# Patient Record
Sex: FEMALE | Race: WHITE | NOT HISPANIC OR LATINO | Employment: OTHER | URBAN - METROPOLITAN AREA
[De-identification: names, ages, dates, MRNs, and addresses within clinical notes are randomized per-mention and may not be internally consistent; named-entity substitution may affect disease eponyms.]

---

## 2017-12-14 ENCOUNTER — OFFICE VISIT (OUTPATIENT)
Dept: SURGERY | Facility: CLINIC | Age: 57
End: 2017-12-14

## 2017-12-14 DIAGNOSIS — K62.5 RECTAL BLEEDING: ICD-10-CM

## 2017-12-14 DIAGNOSIS — R10.30 LOWER ABDOMINAL PAIN: Primary | ICD-10-CM

## 2017-12-14 PROCEDURE — 99244 OFF/OP CNSLTJ NEW/EST MOD 40: CPT | Performed by: SURGERY

## 2017-12-14 RX ORDER — CIPROFLOXACIN 500 MG/1
TABLET, FILM COATED ORAL
Refills: 0 | COMMUNITY
Start: 2017-12-06 | End: 2018-01-16

## 2017-12-14 RX ORDER — ALPRAZOLAM 0.5 MG/1
TABLET ORAL
Refills: 0 | COMMUNITY
Start: 2017-11-16 | End: 2018-01-16

## 2017-12-14 RX ORDER — CEPHALEXIN 500 MG/1
500 CAPSULE ORAL 3 TIMES DAILY
Qty: 21 CAPSULE | Refills: 0 | Status: SHIPPED | OUTPATIENT
Start: 2017-12-14 | End: 2017-12-21

## 2017-12-14 RX ORDER — METRONIDAZOLE 500 MG/1
500 TABLET ORAL 3 TIMES DAILY
Qty: 21 TABLET | Refills: 0 | Status: SHIPPED | OUTPATIENT
Start: 2017-12-14 | End: 2017-12-21

## 2017-12-14 RX ORDER — FLUTICASONE PROPIONATE 50 MCG
SPRAY, SUSPENSION (ML) NASAL
Refills: 12 | COMMUNITY
Start: 2017-10-16 | End: 2018-01-16

## 2017-12-16 NOTE — PROGRESS NOTES
SUMMARY (A/P):    57-year-old lady with possible diverticulitis and rectal bleeding.  Her symptoms improved with ciprofloxacin.  I feel that due to ongoing but milder symptoms she should complete another course of antibiotics and have prescribed Keflex and Flagyl together for 1 week.  I've also recommended proceeding with colonoscopy and this is scheduled for January.  She understands the rationale for the procedure and the nature of the procedure and the risks including but not limited to bleeding and perforation.    I did discuss with her the risks of tobacco use and benefits of smoking cessation as well as the increased risk with surgical procedures and anesthesia/sedation.      CC:  Referred for consultation by Dr. Murrieta regarding abdominal pain.    HPI:  57-year-old lady presents with 2 month history of right lower quadrant abdominal pain that was moderately severe and associated with constipation, mucus discharge, and dark blood in her bowel movements.  Ciprofloxacin was prescribed and improved her symptoms significantly.  Residual symptoms are mild at this point.    PHYSICAL EXAM:   Constitutional: Well-developed well-nourished, no acute distress  Eyes: Conjunctiva normal, sclera nonicteric  ENMT: Hearing grossly normal, oral mucosa moist  Neck: Supple, no palpable mass, normal thyroid, trachea midline  Respiratory: Clear to auscultation, normal inspiratory effort  Cardiovascular: Regular rate, no murmur, no carotid bruit, no peripheral edema, no jugular venous distention  Gastrointestinal: Soft, nontender, no palpable mass, no hepatosplenomegaly, negative for hernia, bowel sounds normal  Lymphatics (palpable nodes):  cervical-negative, axillary-negative  Skin:  Warm, dry, no rash on visualized skin surfaces  Musculoskeletal: Symmetric strength, normal gait  Psychiatric: Alert and oriented ×3, normal affect     ALLERGIES: reviewed, in Epic    MEDICATIONS: reviewed, in Epic    PMH:    Endometrial cancer age  26  Melanoma (anterior neck) 2005  Atypical hyperplasia right breast 2011    PSH:    -Right breast lumpectomy 2011  -Hysterectomy with bilateral salpingo-oophorectomy age 26  -Colonoscopy proximally 5 years ago with adenomatous polyps  -Appendectomy    FAMILY HISTORY:    Negative for colorectal cancer    SOCIAL HISTORY:   Pack per day tobacco use  Occasional alcohol use    ROS:  No chest pain or shortness of air.  All other systems reviewed and negative other than presenting complaints.    RADIOLOGY/ENDOSCOPY:    CT abdomen pelvis 12/5/2017 demonstrated sigmoid diverticulosis with wall thickening, cannot exclude diverticulitis.  I reviewed the images and concur that the sigmoid colon appears chronically thickened without significant perisigmoid inflammatory change.    JAG COLE M.D.

## 2018-01-16 RX ORDER — ALPRAZOLAM 0.5 MG/1
0.5 TABLET ORAL DAILY PRN
COMMUNITY
End: 2021-02-01 | Stop reason: SDUPTHER

## 2018-01-17 ENCOUNTER — ANESTHESIA (OUTPATIENT)
Dept: GASTROENTEROLOGY | Facility: HOSPITAL | Age: 58
End: 2018-01-17

## 2018-01-17 ENCOUNTER — ANESTHESIA EVENT (OUTPATIENT)
Dept: GASTROENTEROLOGY | Facility: HOSPITAL | Age: 58
End: 2018-01-17

## 2018-01-17 ENCOUNTER — HOSPITAL ENCOUNTER (OUTPATIENT)
Facility: HOSPITAL | Age: 58
Setting detail: HOSPITAL OUTPATIENT SURGERY
Discharge: HOME | End: 2018-01-17
Attending: SURGERY | Admitting: SURGERY

## 2018-01-17 VITALS
RESPIRATION RATE: 18 BRPM | BODY MASS INDEX: 19.91 KG/M2 | WEIGHT: 131.4 LBS | OXYGEN SATURATION: 100 % | HEART RATE: 88 BPM | HEIGHT: 68 IN | DIASTOLIC BLOOD PRESSURE: 68 MMHG | TEMPERATURE: 98.4 F | SYSTOLIC BLOOD PRESSURE: 116 MMHG

## 2018-01-17 PROCEDURE — 45330 DIAGNOSTIC SIGMOIDOSCOPY: CPT | Performed by: SURGERY

## 2018-01-17 PROCEDURE — 25010000002 PROPOFOL 10 MG/ML EMULSION: Performed by: ANESTHESIOLOGY

## 2018-01-17 RX ORDER — VARENICLINE TARTRATE 1 MG/1
1 TABLET, FILM COATED ORAL 2 TIMES DAILY
COMMUNITY
End: 2018-03-15

## 2018-01-17 RX ORDER — LIDOCAINE HYDROCHLORIDE 20 MG/ML
INJECTION, SOLUTION INFILTRATION; PERINEURAL AS NEEDED
Status: DISCONTINUED | OUTPATIENT
Start: 2018-01-17 | End: 2018-01-17 | Stop reason: SURG

## 2018-01-17 RX ORDER — PROPOFOL 10 MG/ML
VIAL (ML) INTRAVENOUS AS NEEDED
Status: DISCONTINUED | OUTPATIENT
Start: 2018-01-17 | End: 2018-01-17 | Stop reason: SURG

## 2018-01-17 RX ORDER — SODIUM CHLORIDE, SODIUM LACTATE, POTASSIUM CHLORIDE, CALCIUM CHLORIDE 600; 310; 30; 20 MG/100ML; MG/100ML; MG/100ML; MG/100ML
30 INJECTION, SOLUTION INTRAVENOUS CONTINUOUS PRN
Status: DISCONTINUED | OUTPATIENT
Start: 2018-01-17 | End: 2018-01-17 | Stop reason: HOSPADM

## 2018-01-17 RX ORDER — SODIUM CHLORIDE 0.9 % (FLUSH) 0.9 %
1-10 SYRINGE (ML) INJECTION AS NEEDED
Status: DISCONTINUED | OUTPATIENT
Start: 2018-01-17 | End: 2018-01-17 | Stop reason: HOSPADM

## 2018-01-17 RX ORDER — PROPOFOL 10 MG/ML
VIAL (ML) INTRAVENOUS CONTINUOUS PRN
Status: DISCONTINUED | OUTPATIENT
Start: 2018-01-17 | End: 2018-01-17 | Stop reason: SURG

## 2018-01-17 RX ADMIN — PROPOFOL 120 MCG/KG/MIN: 10 INJECTION, EMULSION INTRAVENOUS at 13:47

## 2018-01-17 RX ADMIN — SODIUM CHLORIDE, POTASSIUM CHLORIDE, SODIUM LACTATE AND CALCIUM CHLORIDE 30 ML/HR: 600; 310; 30; 20 INJECTION, SOLUTION INTRAVENOUS at 13:14

## 2018-01-17 RX ADMIN — LIDOCAINE HYDROCHLORIDE 40 MG: 20 INJECTION, SOLUTION INFILTRATION; PERINEURAL at 13:47

## 2018-01-17 RX ADMIN — PROPOFOL 50 MG: 10 INJECTION, EMULSION INTRAVENOUS at 13:47

## 2018-01-17 NOTE — ANESTHESIA POSTPROCEDURE EVALUATION
"Patient: Janis Rosa    Procedure Summary     Date Anesthesia Start Anesthesia Stop Room / Location    01/17/18 1342 1404  [facility]       Procedure Diagnosis Surgeon Provider    FLEXIBLE SIGMOIDOSCPY TO 50 CM (N/A ) Rectal bleeding; Lower abdominal pain  (Rectal bleeding [K62.5]; Lower abdominal pain [R10.30]) MD Melania Rosas MD          Anesthesia Type: MAC  Last vitals  BP   96/71 (01/17/18 1416)   Temp   36.9 °C (98.4 °F) (01/17/18 1416)   Pulse   81 (01/17/18 1416)   Resp   18 (01/17/18 1307)     SpO2   96 % (01/17/18 1416)     Post Anesthesia Care and Evaluation    Patient location during evaluation: PACU  Patient participation: complete - patient participated  Level of consciousness: awake and alert  Pain management: adequate  Airway patency: patent  Anesthetic complications: No anesthetic complications    Cardiovascular status: acceptable  Respiratory status: acceptable  Hydration status: acceptable    Comments: BP 96/71 (BP Location: Left arm, Patient Position: Lying)  Pulse 81  Temp 36.9 °C (98.4 °F) (Oral)   Resp 18  Ht 172.7 cm (68\")  Wt 59.6 kg (131 lb 6.4 oz)  SpO2 96%  BMI 19.98 kg/m2      "

## 2018-01-17 NOTE — PLAN OF CARE
Problem: Patient Care Overview (Adult)  Goal: Plan of Care Review  Outcome: Ongoing (interventions implemented as appropriate)   01/17/18 1252   Coping/Psychosocial Response Interventions   Plan Of Care Reviewed With patient   Patient Care Overview   Progress improving   Outcome Evaluation   Outcome Summary/Follow up Plan vss, ready for or     Goal: Adult Individualization and Mutuality  Outcome: Ongoing (interventions implemented as appropriate)   01/17/18 1252   Individualization   Patient Specific Preferences jody by evelio     Goal: Discharge Needs Assessment  Outcome: Ongoing (interventions implemented as appropriate)   01/17/18 1252   Discharge Needs Assessment   Concerns To Be Addressed denies needs/concerns at this time   Discharge Disposition home or self-care   Living Environment   Transportation Available car;family or friend will provide       Problem: GI Endoscopy (Adult)  Intervention: Monitor/Manage Procedure Recovery   01/17/18 1252   Respiratory Interventions   Airway/Ventilation Management airway patency maintained   Coping/Psychosocial Interventions   Environmental Support calm environment promoted   Activity   Activity Type activity adjusted per tolerance   Cardiac Interventions   Warming Thermoregulation Maintenance warm fluids administered     Intervention: Prevent Dorothea-procedural Injury   01/17/18 1252   Positioning   Positioning side lying, left   Head Of Bed (HOB) Position HOB elevated       Goal: Signs and Symptoms of Listed Potential Problems Will be Absent or Manageable (GI Endoscopy)  Outcome: Ongoing (interventions implemented as appropriate)   01/17/18 1252   GI Endoscopy   Problems Assessed (GI Endoscopy) all   Problems Present (GI Endoscopy) none

## 2018-01-17 NOTE — OP NOTE
PREOPERATIVE DIAGNOSIS:  Abdominal pain  Rectal bleeding    POSTOPERATIVE DIAGNOSIS AND FINDINGS:  Marked sigmoid diverticulosis with angulation precluding passage of colonoscope    PROCEDURE:  Flexible sigmoidoscopy to 50 cm    SURGEON:  Jaycob Anthony MD    ANESTHESIA:  MAC    SPECIMEN(S):  none    DESCRIPTION:  In decubitus position digital rectal exam was normal. Colonoscope inserted under direct visualization of lumen to sigmoid colon, 50 cm from anal verge.  Marked sigmoid diverticulosis was noted.  At 50 cm angulation of colon precluded passage of colonoscope despite various maneuvers.  Visualized portion of colon and rectum were normal with the exception of diverticulosis.  Tolerated well.    Jaycob Anthony M.D.

## 2018-01-17 NOTE — H&P
SUMMARY (A/P):    57-year-old lady with possible diverticulitis and rectal bleeding.  Her symptoms improved with ciprofloxacin.  I feel that due to ongoing but milder symptoms she should complete another course of antibiotics and have prescribed Keflex and Flagyl together for 1 week.  I've also recommended proceeding with colonoscopy and this is scheduled for January.  She understands the rationale for the procedure and the nature of the procedure and the risks including but not limited to bleeding and perforation.     I did discuss with her the risks of tobacco use and benefits of smoking cessation as well as the increased risk with surgical procedures and anesthesia/sedation.        CC:  Referred for consultation by Dr. Murrieta regarding abdominal pain.     HPI:  57-year-old lady presents with 2 month history of right lower quadrant abdominal pain that was moderately severe and associated with constipation, mucus discharge, and dark blood in her bowel movements.  Ciprofloxacin was prescribed and improved her symptoms significantly.  Residual symptoms are mild at this point.     PHYSICAL EXAM:   Constitutional: Well-developed well-nourished, no acute distress  Respiratory: Clear to auscultation, normal inspiratory effort  Cardiovascular: Regular rate  Gastrointestinal: Soft, nontender  Psychiatric: Alert and oriented ×3, normal affect      ALLERGIES: reviewed, in Epic     MEDICATIONS: reviewed, in Epic     PMH:    Endometrial cancer age 26  Melanoma (anterior neck) 2005  Atypical hyperplasia right breast 2011     PSH:    -Right breast lumpectomy 2011  -Hysterectomy with bilateral salpingo-oophorectomy age 26  -Colonoscopy proximally 5 years ago with adenomatous polyps  -Appendectomy     FAMILY HISTORY:    Negative for colorectal cancer     SOCIAL HISTORY:   Pack per day tobacco use  Occasional alcohol use      RADIOLOGY/ENDOSCOPY:    CT abdomen pelvis 12/5/2017 demonstrated sigmoid diverticulosis with wall  thickening, cannot exclude diverticulitis.  I reviewed the images and concur that the sigmoid colon appears chronically thickened without significant perisigmoid inflammatory change.     JAG COLE M.D.

## 2018-01-17 NOTE — ANESTHESIA PREPROCEDURE EVALUATION
Anesthesia Evaluation     no history of anesthetic complications:         Airway   Mallampati: II  TM distance: >3 FB  Neck ROM: full  Dental      Comment: Cap upper front tooth    Pulmonary    (+) a smoker Current Smoked day of surgery,   (-) asthma, recent URI  Cardiovascular     (-) hypertension, dysrhythmias, angina, hyperlipidemia      Neuro/Psych  GI/Hepatic/Renal/Endo    (-) hepatitis, no renal disease    Musculoskeletal     Abdominal    Substance History      OB/GYN          Other                                                Anesthesia Plan    ASA 2     MAC     intravenous induction   Anesthetic plan and risks discussed with patient.

## 2018-01-25 ENCOUNTER — OFFICE VISIT (OUTPATIENT)
Dept: SURGERY | Facility: CLINIC | Age: 58
End: 2018-01-25

## 2018-01-25 VITALS — WEIGHT: 134 LBS | HEART RATE: 78 BPM | HEIGHT: 68 IN | OXYGEN SATURATION: 99 % | BODY MASS INDEX: 20.31 KG/M2

## 2018-01-25 DIAGNOSIS — R10.30 LOWER ABDOMINAL PAIN: ICD-10-CM

## 2018-01-25 DIAGNOSIS — K57.32 DIVERTICULITIS OF LARGE INTESTINE WITHOUT PERFORATION OR ABSCESS WITHOUT BLEEDING: Primary | ICD-10-CM

## 2018-01-25 DIAGNOSIS — Z12.11 SCREEN FOR COLON CANCER: ICD-10-CM

## 2018-01-25 PROCEDURE — 99212 OFFICE O/P EST SF 10 MIN: CPT | Performed by: SURGERY

## 2018-01-28 NOTE — PROGRESS NOTES
CC:  Follow-up diverticulitis    HPI:  57-year-old lady whom I initially saw on 1/17/2018 with left lower quadrant abdominal pain and a CT scan from December 2017 showing sigmoid diverticulosis with wall thickening, diverticulitis could not be excluded.  She returns for follow-up today indicating that her pain remains in the left lower quadrant with moderate severity.  I attempted colonoscopy on 1/17/2018 and this demonstrated marked sigmoid diverticulosis with angulation of the colon precluding passage of the colonoscope past the sigmoid colon.    ROS:  Negative for fever or chills    PE:    Awake, alert, oriented  Lungs CTA, normal inspiratory effort  Heart RRR, no JVD  Abdomen: Soft, nondistended, not particularly tender to palpation    IMPRESSION & PLAN:  At this point I am suspecting that she does have chronic diverticular thickening of the sigmoid colon and likely will come to surgical resection.  As we were unable to get a complete colonoscopy I did recommend that we proceed with barium enema prior to making final recommendation for surgery.  She is scheduled accordingly.

## 2018-02-07 ENCOUNTER — HOSPITAL ENCOUNTER (OUTPATIENT)
Dept: GENERAL RADIOLOGY | Facility: HOSPITAL | Age: 58
Discharge: HOME | End: 2018-02-07
Attending: SURGERY | Admitting: SURGERY

## 2018-02-07 DIAGNOSIS — R10.30 LOWER ABDOMINAL PAIN: ICD-10-CM

## 2018-02-07 DIAGNOSIS — Z12.11 SCREEN FOR COLON CANCER: ICD-10-CM

## 2018-02-07 DIAGNOSIS — K57.32 DIVERTICULITIS OF LARGE INTESTINE WITHOUT PERFORATION OR ABSCESS WITHOUT BLEEDING: ICD-10-CM

## 2018-02-07 PROCEDURE — 74280 X-RAY XM COLON 2CNTRST STD: CPT

## 2018-02-07 RX ADMIN — BARIUM SULFATE 1500 ML: 1.05 SUSPENSION ORAL; RECTAL at 10:41

## 2018-02-08 ENCOUNTER — PREP FOR SURGERY (OUTPATIENT)
Dept: OTHER | Facility: HOSPITAL | Age: 58
End: 2018-02-08

## 2018-02-08 DIAGNOSIS — K57.32 DIVERTICULITIS OF LARGE INTESTINE WITHOUT PERFORATION OR ABSCESS WITHOUT BLEEDING: Primary | ICD-10-CM

## 2018-02-08 RX ORDER — ALVIMOPAN 12 MG/1
12 CAPSULE ORAL ONCE
Status: CANCELLED | OUTPATIENT
Start: 2018-03-19 | End: 2018-03-19

## 2018-03-15 ENCOUNTER — APPOINTMENT (OUTPATIENT)
Dept: PREADMISSION TESTING | Facility: HOSPITAL | Age: 58
End: 2018-03-15

## 2018-03-15 VITALS
RESPIRATION RATE: 20 BRPM | HEART RATE: 86 BPM | SYSTOLIC BLOOD PRESSURE: 99 MMHG | WEIGHT: 134 LBS | TEMPERATURE: 97.4 F | BODY MASS INDEX: 20.31 KG/M2 | DIASTOLIC BLOOD PRESSURE: 64 MMHG | OXYGEN SATURATION: 100 % | HEIGHT: 68 IN

## 2018-03-15 DIAGNOSIS — K57.32 DIVERTICULITIS OF LARGE INTESTINE WITHOUT PERFORATION OR ABSCESS WITHOUT BLEEDING: ICD-10-CM

## 2018-03-15 LAB
ALBUMIN SERPL-MCNC: 4.1 G/DL (ref 3.5–5.2)
ALBUMIN/GLOB SERPL: 1.6 G/DL
ALP SERPL-CCNC: 72 U/L (ref 39–117)
ALT SERPL W P-5'-P-CCNC: 15 U/L (ref 1–33)
ANION GAP SERPL CALCULATED.3IONS-SCNC: 12.4 MMOL/L
AST SERPL-CCNC: 22 U/L (ref 1–32)
BASOPHILS # BLD AUTO: 0.02 10*3/MM3 (ref 0–0.2)
BASOPHILS NFR BLD AUTO: 0.2 % (ref 0–1.5)
BILIRUB SERPL-MCNC: 0.4 MG/DL (ref 0.1–1.2)
BUN BLD-MCNC: 11 MG/DL (ref 6–20)
BUN/CREAT SERPL: 15.9 (ref 7–25)
CALCIUM SPEC-SCNC: 9.1 MG/DL (ref 8.6–10.5)
CHLORIDE SERPL-SCNC: 103 MMOL/L (ref 98–107)
CO2 SERPL-SCNC: 26.6 MMOL/L (ref 22–29)
CREAT BLD-MCNC: 0.69 MG/DL (ref 0.57–1)
DEPRECATED RDW RBC AUTO: 48.5 FL (ref 37–54)
EOSINOPHIL # BLD AUTO: 0.1 10*3/MM3 (ref 0–0.7)
EOSINOPHIL NFR BLD AUTO: 1.1 % (ref 0.3–6.2)
ERYTHROCYTE [DISTWIDTH] IN BLOOD BY AUTOMATED COUNT: 14.2 % (ref 11.7–13)
GFR SERPL CREATININE-BSD FRML MDRD: 88 ML/MIN/1.73
GLOBULIN UR ELPH-MCNC: 2.5 GM/DL
GLUCOSE BLD-MCNC: 121 MG/DL (ref 65–99)
HCT VFR BLD AUTO: 43.4 % (ref 35.6–45.5)
HGB BLD-MCNC: 13.6 G/DL (ref 11.9–15.5)
IMM GRANULOCYTES # BLD: 0 10*3/MM3 (ref 0–0.03)
IMM GRANULOCYTES NFR BLD: 0 % (ref 0–0.5)
LYMPHOCYTES # BLD AUTO: 2.47 10*3/MM3 (ref 0.9–4.8)
LYMPHOCYTES NFR BLD AUTO: 26.8 % (ref 19.6–45.3)
MCH RBC QN AUTO: 29.4 PG (ref 26.9–32)
MCHC RBC AUTO-ENTMCNC: 31.3 G/DL (ref 32.4–36.3)
MCV RBC AUTO: 93.9 FL (ref 80.5–98.2)
MONOCYTES # BLD AUTO: 0.74 10*3/MM3 (ref 0.2–1.2)
MONOCYTES NFR BLD AUTO: 8 % (ref 5–12)
NEUTROPHILS # BLD AUTO: 5.87 10*3/MM3 (ref 1.9–8.1)
NEUTROPHILS NFR BLD AUTO: 63.9 % (ref 42.7–76)
PLATELET # BLD AUTO: 290 10*3/MM3 (ref 140–500)
PMV BLD AUTO: 10.1 FL (ref 6–12)
POTASSIUM BLD-SCNC: 3.9 MMOL/L (ref 3.5–5.2)
PROT SERPL-MCNC: 6.6 G/DL (ref 6–8.5)
RBC # BLD AUTO: 4.62 10*6/MM3 (ref 3.9–5.2)
SODIUM BLD-SCNC: 142 MMOL/L (ref 136–145)
WBC NRBC COR # BLD: 9.2 10*3/MM3 (ref 4.5–10.7)

## 2018-03-15 PROCEDURE — 85025 COMPLETE CBC W/AUTO DIFF WBC: CPT | Performed by: SURGERY

## 2018-03-15 PROCEDURE — 80053 COMPREHEN METABOLIC PANEL: CPT | Performed by: SURGERY

## 2018-03-15 PROCEDURE — 36415 COLL VENOUS BLD VENIPUNCTURE: CPT

## 2018-03-15 NOTE — DISCHARGE INSTRUCTIONS
Take the following medications the morning of surgery with a small sip of water:  none      General Instructions:  • Do not eat solid food after midnight the night before surgery.  • You may drink clear liquids day of surgery but must stop at least one hour before your hospital arrival time.  • It is beneficial for you to have a clear drink that contains carbohydrates the day of surgery.  We suggest a 12 to 20 ounce bottle of Gatorade or Powerade for non-diabetic patients or a 12 to 20 ounce bottle of G2 or Powerade Zero for diabetic patients. (Pediatric patients, are not advised to drink a 12 to 20 ounce carbohydrate drink)    Clear liquids are liquids you can see through.  Nothing red in color.     Plain water                               Sports drinks  Sodas                                   Gelatin (Jell-O)  Fruit juices without pulp such as white grape juice and apple juice  Popsicles that contain no fruit or yogurt  Tea or coffee (no cream or milk added)  Gatorade / Powerade  G2 / Powerade Zero    • Infants may have breast milk up to four hours before surgery.  • Infants drinking formula may drink formula up to six hours before surgery.   • Patients who avoid smoking, chewing tobacco and alcohol for 4 weeks prior to surgery have a reduced risk of post-operative complications.  Quit smoking as many days before surgery as you can.  • Do not smoke, use chewing tobacco or drink alcohol the day of surgery.   • If applicable bring your C-PAP/ BI-PAP machine.  • Bring any papers given to you in the doctor’s office.  • Wear clean comfortable clothes and socks.  • Do not wear contact lenses or make-up.  Bring a case for your glasses.   • Bring crutches or walker if applicable.  • Remove all piercings.  Leave jewelry and any other valuables at home.  • Hair extensions with metal clips must be removed prior to surgery.  • The Pre-Admission Testing nurse will instruct you to bring medications if unable to obtain an  accurate list in Pre-Admission Testing.        If you were given a blood bank ID arm band remember to bring it with you the day of surgery.    Preventing a Surgical Site Infection:  • For 2 to 3 days before surgery, avoid shaving with a razor because the razor can irritate skin and make it easier to develop an infection.  • The night prior to surgery sleep in a clean bed with clean clothing.  Do not allow pets to sleep with you.  • Shower on the morning of surgery using a fresh bar of anti-bacterial soap (such as Dial) and clean washcloth.  Dry with a clean towel and dress in clean clothing.  • Ask your surgeon if you will be receiving antibiotics prior to surgery.  • Make sure you, your family, and all healthcare providers clean their hands with soap and water or an alcohol based hand  before caring for you or your wound.    Day of surgery: 3/19/2018 arrival time 11:00 am  Upon arrival, a Pre-op nurse and Anesthesiologist will review your health history, obtain vital signs, and answer questions you may have.  The only belongings needed at this time will be your home medications and if applicable your C-PAP/BI-PAP machine.  If you are staying overnight your family can leave the rest of your belongings in the car and bring them to your room later.  A Pre-op nurse will start an IV and you may receive medication in preparation for surgery, including something to help you relax.  Your family will be able to see you in the Pre-op area.  While you are in surgery your family should notify the waiting room  if they leave the waiting room area and provide a contact phone number.    Please be aware that surgery does come with discomfort.  We want to make every effort to control your discomfort so please discuss any uncontrolled symptoms with your nurse.   Your doctor will most likely have prescribed pain medications.      If you are going home after surgery you will receive individualized written care  instructions before being discharged.  A responsible adult must drive you to and from the hospital on the day of your surgery and stay with you for 24 hours.    If you are staying overnight following surgery, you will be transported to your hospital room following the recovery period.  [facility] has all private rooms.    If you have any questions please call Pre-Admission Testing at [phone].  Deductibles and co-payments are collected on the day of service. Please be prepared to pay the required co-pay, deductible or deposit on the day of service as defined by your plan.

## 2018-03-19 ENCOUNTER — HOSPITAL ENCOUNTER (INPATIENT)
Facility: HOSPITAL | Age: 58
LOS: 2 days | Discharge: HOME | End: 2018-03-21
Attending: SURGERY | Admitting: SURGERY

## 2018-03-19 ENCOUNTER — ANESTHESIA EVENT (OUTPATIENT)
Dept: PERIOP | Facility: HOSPITAL | Age: 58
End: 2018-03-19

## 2018-03-19 ENCOUNTER — ANESTHESIA (OUTPATIENT)
Dept: PERIOP | Facility: HOSPITAL | Age: 58
End: 2018-03-19

## 2018-03-19 DIAGNOSIS — K57.32 DIVERTICULITIS OF LARGE INTESTINE WITHOUT PERFORATION OR ABSCESS WITHOUT BLEEDING: ICD-10-CM

## 2018-03-19 PROCEDURE — 44213 LAP MOBIL SPLENIC FL ADD-ON: CPT | Performed by: PHYSICIAN ASSISTANT

## 2018-03-19 PROCEDURE — 25010000002 DEXAMETHASONE PER 1 MG: Performed by: NURSE ANESTHETIST, CERTIFIED REGISTERED

## 2018-03-19 PROCEDURE — 44213 LAP MOBIL SPLENIC FL ADD-ON: CPT | Performed by: SURGERY

## 2018-03-19 PROCEDURE — 25010000002 CEFOXITIN PER 1 G: Performed by: SURGERY

## 2018-03-19 PROCEDURE — 0DTN4ZZ RESECTION OF SIGMOID COLON, PERCUTANEOUS ENDOSCOPIC APPROACH: ICD-10-PCS | Performed by: SURGERY

## 2018-03-19 PROCEDURE — 25010000002 FENTANYL CITRATE (PF) 100 MCG/2ML SOLUTION: Performed by: NURSE ANESTHETIST, CERTIFIED REGISTERED

## 2018-03-19 PROCEDURE — 25010000002 PHENYLEPHRINE PER 1 ML: Performed by: NURSE ANESTHETIST, CERTIFIED REGISTERED

## 2018-03-19 PROCEDURE — 25010000002 KETOROLAC TROMETHAMINE PER 15 MG: Performed by: ANESTHESIOLOGY

## 2018-03-19 PROCEDURE — 44204 LAPARO PARTIAL COLECTOMY: CPT | Performed by: PHYSICIAN ASSISTANT

## 2018-03-19 PROCEDURE — 25010000002 MIDAZOLAM PER 1 MG: Performed by: ANESTHESIOLOGY

## 2018-03-19 PROCEDURE — 88307 TISSUE EXAM BY PATHOLOGIST: CPT | Performed by: SURGERY

## 2018-03-19 PROCEDURE — 25010000002 HYDROMORPHONE HCL PF 500 MG/50ML SOLUTION: Performed by: SURGERY

## 2018-03-19 PROCEDURE — 25010000002 PROPOFOL 10 MG/ML EMULSION: Performed by: NURSE ANESTHETIST, CERTIFIED REGISTERED

## 2018-03-19 PROCEDURE — 25010000002 HYDROMORPHONE PER 4 MG: Performed by: NURSE ANESTHETIST, CERTIFIED REGISTERED

## 2018-03-19 PROCEDURE — 25010000002 ONDANSETRON PER 1 MG: Performed by: ANESTHESIOLOGY

## 2018-03-19 PROCEDURE — 44204 LAPARO PARTIAL COLECTOMY: CPT | Performed by: SURGERY

## 2018-03-19 RX ORDER — PROMETHAZINE HYDROCHLORIDE 25 MG/ML
12.5 INJECTION, SOLUTION INTRAMUSCULAR; INTRAVENOUS ONCE AS NEEDED
Status: DISCONTINUED | OUTPATIENT
Start: 2018-03-19 | End: 2018-03-19 | Stop reason: HOSPADM

## 2018-03-19 RX ORDER — PROMETHAZINE HYDROCHLORIDE 25 MG/1
25 SUPPOSITORY RECTAL ONCE AS NEEDED
Status: DISCONTINUED | OUTPATIENT
Start: 2018-03-19 | End: 2018-03-19 | Stop reason: HOSPADM

## 2018-03-19 RX ORDER — PROMETHAZINE HYDROCHLORIDE 25 MG/1
12.5 TABLET ORAL ONCE AS NEEDED
Status: DISCONTINUED | OUTPATIENT
Start: 2018-03-19 | End: 2018-03-19 | Stop reason: HOSPADM

## 2018-03-19 RX ORDER — ONDANSETRON 2 MG/ML
4 INJECTION INTRAMUSCULAR; INTRAVENOUS ONCE AS NEEDED
Status: DISCONTINUED | OUTPATIENT
Start: 2018-03-19 | End: 2018-03-19 | Stop reason: HOSPADM

## 2018-03-19 RX ORDER — SODIUM CHLORIDE 0.9 % (FLUSH) 0.9 %
1-10 SYRINGE (ML) INJECTION AS NEEDED
Status: DISCONTINUED | OUTPATIENT
Start: 2018-03-19 | End: 2018-03-19 | Stop reason: HOSPADM

## 2018-03-19 RX ORDER — EPHEDRINE SULFATE 50 MG/ML
5 INJECTION, SOLUTION INTRAVENOUS ONCE AS NEEDED
Status: DISCONTINUED | OUTPATIENT
Start: 2018-03-19 | End: 2018-03-19 | Stop reason: HOSPADM

## 2018-03-19 RX ORDER — LABETALOL HYDROCHLORIDE 5 MG/ML
5 INJECTION, SOLUTION INTRAVENOUS
Status: DISCONTINUED | OUTPATIENT
Start: 2018-03-19 | End: 2018-03-19 | Stop reason: HOSPADM

## 2018-03-19 RX ORDER — MAGNESIUM HYDROXIDE 1200 MG/15ML
LIQUID ORAL AS NEEDED
Status: DISCONTINUED | OUTPATIENT
Start: 2018-03-19 | End: 2018-03-19 | Stop reason: HOSPADM

## 2018-03-19 RX ORDER — SODIUM CHLORIDE, SODIUM LACTATE, POTASSIUM CHLORIDE, CALCIUM CHLORIDE 600; 310; 30; 20 MG/100ML; MG/100ML; MG/100ML; MG/100ML
9 INJECTION, SOLUTION INTRAVENOUS CONTINUOUS
Status: DISCONTINUED | OUTPATIENT
Start: 2018-03-19 | End: 2018-03-19

## 2018-03-19 RX ORDER — DEXAMETHASONE SODIUM PHOSPHATE 4 MG/ML
INJECTION, SOLUTION INTRA-ARTICULAR; INTRALESIONAL; INTRAMUSCULAR; INTRAVENOUS; SOFT TISSUE AS NEEDED
Status: DISCONTINUED | OUTPATIENT
Start: 2018-03-19 | End: 2018-03-19 | Stop reason: SURG

## 2018-03-19 RX ORDER — FENTANYL CITRATE 50 UG/ML
INJECTION, SOLUTION INTRAMUSCULAR; INTRAVENOUS AS NEEDED
Status: DISCONTINUED | OUTPATIENT
Start: 2018-03-19 | End: 2018-03-19 | Stop reason: SURG

## 2018-03-19 RX ORDER — LIDOCAINE HYDROCHLORIDE 20 MG/ML
INJECTION, SOLUTION INFILTRATION; PERINEURAL AS NEEDED
Status: DISCONTINUED | OUTPATIENT
Start: 2018-03-19 | End: 2018-03-19 | Stop reason: SURG

## 2018-03-19 RX ORDER — HYDROCODONE BITARTRATE AND ACETAMINOPHEN 5; 325 MG/1; MG/1
2 TABLET ORAL EVERY 4 HOURS PRN
Status: DISCONTINUED | OUTPATIENT
Start: 2018-03-19 | End: 2018-03-21 | Stop reason: HOSPADM

## 2018-03-19 RX ORDER — ALPRAZOLAM 0.5 MG/1
0.5 TABLET ORAL 2 TIMES DAILY PRN
Status: DISCONTINUED | OUTPATIENT
Start: 2018-03-19 | End: 2018-03-21 | Stop reason: HOSPADM

## 2018-03-19 RX ORDER — MIDAZOLAM HYDROCHLORIDE 1 MG/ML
2 INJECTION INTRAMUSCULAR; INTRAVENOUS
Status: DISCONTINUED | OUTPATIENT
Start: 2018-03-19 | End: 2018-03-19 | Stop reason: HOSPADM

## 2018-03-19 RX ORDER — OXYCODONE AND ACETAMINOPHEN 7.5; 325 MG/1; MG/1
1 TABLET ORAL ONCE AS NEEDED
Status: DISCONTINUED | OUTPATIENT
Start: 2018-03-19 | End: 2018-03-19 | Stop reason: HOSPADM

## 2018-03-19 RX ORDER — ONDANSETRON 2 MG/ML
INJECTION INTRAMUSCULAR; INTRAVENOUS AS NEEDED
Status: DISCONTINUED | OUTPATIENT
Start: 2018-03-19 | End: 2018-03-19 | Stop reason: SURG

## 2018-03-19 RX ORDER — PROMETHAZINE HYDROCHLORIDE 25 MG/1
25 TABLET ORAL ONCE AS NEEDED
Status: DISCONTINUED | OUTPATIENT
Start: 2018-03-19 | End: 2018-03-19 | Stop reason: HOSPADM

## 2018-03-19 RX ORDER — HYDROMORPHONE HCL 110MG/55ML
0.5 PATIENT CONTROLLED ANALGESIA SYRINGE INTRAVENOUS
Status: DISCONTINUED | OUTPATIENT
Start: 2018-03-19 | End: 2018-03-19 | Stop reason: HOSPADM

## 2018-03-19 RX ORDER — FAMOTIDINE 10 MG/ML
20 INJECTION, SOLUTION INTRAVENOUS ONCE
Status: COMPLETED | OUTPATIENT
Start: 2018-03-19 | End: 2018-03-19

## 2018-03-19 RX ORDER — SODIUM CHLORIDE 9 MG/ML
INJECTION, SOLUTION INTRAVENOUS AS NEEDED
Status: DISCONTINUED | OUTPATIENT
Start: 2018-03-19 | End: 2018-03-19 | Stop reason: HOSPADM

## 2018-03-19 RX ORDER — SODIUM CHLORIDE 9 MG/ML
75 INJECTION, SOLUTION INTRAVENOUS CONTINUOUS
Status: DISCONTINUED | OUTPATIENT
Start: 2018-03-19 | End: 2018-03-20

## 2018-03-19 RX ORDER — BUPIVACAINE HYDROCHLORIDE AND EPINEPHRINE 5; 5 MG/ML; UG/ML
INJECTION, SOLUTION PERINEURAL AS NEEDED
Status: DISCONTINUED | OUTPATIENT
Start: 2018-03-19 | End: 2018-03-19 | Stop reason: HOSPADM

## 2018-03-19 RX ORDER — HYDRALAZINE HYDROCHLORIDE 20 MG/ML
5 INJECTION INTRAMUSCULAR; INTRAVENOUS
Status: DISCONTINUED | OUTPATIENT
Start: 2018-03-19 | End: 2018-03-19 | Stop reason: HOSPADM

## 2018-03-19 RX ORDER — FENTANYL CITRATE 50 UG/ML
50 INJECTION, SOLUTION INTRAMUSCULAR; INTRAVENOUS
Status: DISCONTINUED | OUTPATIENT
Start: 2018-03-19 | End: 2018-03-19 | Stop reason: HOSPADM

## 2018-03-19 RX ORDER — FLUMAZENIL 0.1 MG/ML
0.2 INJECTION INTRAVENOUS AS NEEDED
Status: DISCONTINUED | OUTPATIENT
Start: 2018-03-19 | End: 2018-03-19 | Stop reason: HOSPADM

## 2018-03-19 RX ORDER — HYDROMORPHONE HYDROCHLORIDE 1 MG/ML
0.5 INJECTION, SOLUTION INTRAMUSCULAR; INTRAVENOUS; SUBCUTANEOUS
Status: DISCONTINUED | OUTPATIENT
Start: 2018-03-19 | End: 2018-03-21 | Stop reason: HOSPADM

## 2018-03-19 RX ORDER — MIDAZOLAM HYDROCHLORIDE 1 MG/ML
1 INJECTION INTRAMUSCULAR; INTRAVENOUS
Status: DISCONTINUED | OUTPATIENT
Start: 2018-03-19 | End: 2018-03-19 | Stop reason: HOSPADM

## 2018-03-19 RX ORDER — PROPOFOL 10 MG/ML
VIAL (ML) INTRAVENOUS AS NEEDED
Status: DISCONTINUED | OUTPATIENT
Start: 2018-03-19 | End: 2018-03-19 | Stop reason: SURG

## 2018-03-19 RX ORDER — ROCURONIUM BROMIDE 10 MG/ML
INJECTION, SOLUTION INTRAVENOUS AS NEEDED
Status: DISCONTINUED | OUTPATIENT
Start: 2018-03-19 | End: 2018-03-19 | Stop reason: SURG

## 2018-03-19 RX ORDER — HYDROCODONE BITARTRATE AND ACETAMINOPHEN 7.5; 325 MG/1; MG/1
1 TABLET ORAL ONCE AS NEEDED
Status: DISCONTINUED | OUTPATIENT
Start: 2018-03-19 | End: 2018-03-19 | Stop reason: HOSPADM

## 2018-03-19 RX ORDER — LIDOCAINE HYDROCHLORIDE 10 MG/ML
0.5 INJECTION, SOLUTION EPIDURAL; INFILTRATION; INTRACAUDAL; PERINEURAL ONCE AS NEEDED
Status: DISCONTINUED | OUTPATIENT
Start: 2018-03-19 | End: 2018-03-19 | Stop reason: HOSPADM

## 2018-03-19 RX ORDER — ONDANSETRON 2 MG/ML
4 INJECTION INTRAMUSCULAR; INTRAVENOUS EVERY 4 HOURS PRN
Status: DISCONTINUED | OUTPATIENT
Start: 2018-03-19 | End: 2018-03-21 | Stop reason: HOSPADM

## 2018-03-19 RX ORDER — KETOROLAC TROMETHAMINE 30 MG/ML
INJECTION, SOLUTION INTRAMUSCULAR; INTRAVENOUS AS NEEDED
Status: DISCONTINUED | OUTPATIENT
Start: 2018-03-19 | End: 2018-03-19 | Stop reason: SURG

## 2018-03-19 RX ORDER — NALOXONE HCL 0.4 MG/ML
0.2 VIAL (ML) INJECTION AS NEEDED
Status: DISCONTINUED | OUTPATIENT
Start: 2018-03-19 | End: 2018-03-19 | Stop reason: HOSPADM

## 2018-03-19 RX ORDER — DIPHENHYDRAMINE HYDROCHLORIDE 50 MG/ML
12.5 INJECTION INTRAMUSCULAR; INTRAVENOUS
Status: DISCONTINUED | OUTPATIENT
Start: 2018-03-19 | End: 2018-03-19 | Stop reason: HOSPADM

## 2018-03-19 RX ORDER — NALOXONE HCL 0.4 MG/ML
0.1 VIAL (ML) INJECTION
Status: DISCONTINUED | OUTPATIENT
Start: 2018-03-19 | End: 2018-03-21 | Stop reason: HOSPADM

## 2018-03-19 RX ORDER — ALVIMOPAN 12 MG/1
12 CAPSULE ORAL ONCE
Status: COMPLETED | OUTPATIENT
Start: 2018-03-19 | End: 2018-03-19

## 2018-03-19 RX ADMIN — LIDOCAINE HYDROCHLORIDE 50 MG: 20 INJECTION, SOLUTION INFILTRATION; PERINEURAL at 13:51

## 2018-03-19 RX ADMIN — ROCURONIUM BROMIDE 50 MG: 10 INJECTION INTRAVENOUS at 13:51

## 2018-03-19 RX ADMIN — CEFOXITIN SODIUM 2 G: 1 POWDER, FOR SOLUTION INTRAVENOUS at 13:51

## 2018-03-19 RX ADMIN — FENTANYL CITRATE 50 MCG: 50 INJECTION INTRAMUSCULAR; INTRAVENOUS at 14:06

## 2018-03-19 RX ADMIN — FENTANYL CITRATE 50 MCG: 50 INJECTION INTRAMUSCULAR; INTRAVENOUS at 15:23

## 2018-03-19 RX ADMIN — HYDROMORPHONE HYDROCHLORIDE 0.5 MG: 10 INJECTION INTRAMUSCULAR; INTRAVENOUS; SUBCUTANEOUS at 17:31

## 2018-03-19 RX ADMIN — HYDROMORPHONE HYDROCHLORIDE 0.5 MG: 2 INJECTION INTRAMUSCULAR; INTRAVENOUS; SUBCUTANEOUS at 15:41

## 2018-03-19 RX ADMIN — PROPOFOL 150 MG: 10 INJECTION, EMULSION INTRAVENOUS at 13:51

## 2018-03-19 RX ADMIN — SUGAMMADEX 250 MG: 100 INJECTION, SOLUTION INTRAVENOUS at 15:12

## 2018-03-19 RX ADMIN — SODIUM CHLORIDE, POTASSIUM CHLORIDE, SODIUM LACTATE AND CALCIUM CHLORIDE 9 ML/HR: 600; 310; 30; 20 INJECTION, SOLUTION INTRAVENOUS at 12:04

## 2018-03-19 RX ADMIN — HYDROMORPHONE HYDROCHLORIDE 0.5 MG: 2 INJECTION INTRAMUSCULAR; INTRAVENOUS; SUBCUTANEOUS at 16:17

## 2018-03-19 RX ADMIN — FAMOTIDINE 20 MG: 10 INJECTION INTRAVENOUS at 12:04

## 2018-03-19 RX ADMIN — HYDROMORPHONE HYDROCHLORIDE 0.5 MG: 2 INJECTION INTRAMUSCULAR; INTRAVENOUS; SUBCUTANEOUS at 15:33

## 2018-03-19 RX ADMIN — ROCURONIUM BROMIDE 10 MG: 10 INJECTION INTRAVENOUS at 14:59

## 2018-03-19 RX ADMIN — FENTANYL CITRATE 50 MCG: 50 INJECTION INTRAMUSCULAR; INTRAVENOUS at 15:29

## 2018-03-19 RX ADMIN — MIDAZOLAM 2 MG: 1 INJECTION INTRAMUSCULAR; INTRAVENOUS at 12:06

## 2018-03-19 RX ADMIN — HYDROMORPHONE HYDROCHLORIDE 0.5 MG: 2 INJECTION INTRAMUSCULAR; INTRAVENOUS; SUBCUTANEOUS at 15:55

## 2018-03-19 RX ADMIN — FENTANYL CITRATE 50 MCG: 50 INJECTION INTRAMUSCULAR; INTRAVENOUS at 15:47

## 2018-03-19 RX ADMIN — HYDROCODONE BITARTRATE AND ACETAMINOPHEN 2 TABLET: 5; 325 TABLET ORAL at 20:48

## 2018-03-19 RX ADMIN — ALVIMOPAN 12 MG: 12 CAPSULE ORAL at 11:39

## 2018-03-19 RX ADMIN — FENTANYL CITRATE 50 MCG: 50 INJECTION INTRAMUSCULAR; INTRAVENOUS at 15:53

## 2018-03-19 RX ADMIN — CEFOXITIN SODIUM 2 G: 2 POWDER, FOR SOLUTION INTRAVENOUS at 20:37

## 2018-03-19 RX ADMIN — HYDROMORPHONE HYDROCHLORIDE 0.5 MG: 10 INJECTION INTRAMUSCULAR; INTRAVENOUS; SUBCUTANEOUS at 19:20

## 2018-03-19 RX ADMIN — KETOROLAC TROMETHAMINE 30 MG: 30 INJECTION, SOLUTION INTRAMUSCULAR; INTRAVENOUS at 15:08

## 2018-03-19 RX ADMIN — FENTANYL CITRATE 50 MCG: 50 INJECTION INTRAMUSCULAR; INTRAVENOUS at 15:36

## 2018-03-19 RX ADMIN — FENTANYL CITRATE 50 MCG: 50 INJECTION INTRAMUSCULAR; INTRAVENOUS at 13:51

## 2018-03-19 RX ADMIN — DEXAMETHASONE SODIUM PHOSPHATE 4 MG: 4 INJECTION INTRA-ARTICULAR; INTRALESIONAL; INTRAMUSCULAR; INTRAVENOUS; SOFT TISSUE at 13:59

## 2018-03-19 RX ADMIN — ONDANSETRON 4 MG: 2 INJECTION INTRAMUSCULAR; INTRAVENOUS at 15:05

## 2018-03-19 RX ADMIN — ROCURONIUM BROMIDE 10 MG: 10 INJECTION INTRAVENOUS at 14:37

## 2018-03-19 RX ADMIN — PHENYLEPHRINE HYDROCHLORIDE 100 MCG: 10 INJECTION INTRAVENOUS at 14:01

## 2018-03-19 NOTE — ANESTHESIA POSTPROCEDURE EVALUATION
Patient: Janis Rosa    Procedure Summary     Date:  03/19/18 Room / Location:  [facility] OR 06 / [facility] MAIN OR    Anesthesia Start:  1347 Anesthesia Stop:  1525    Procedure:  LAPAROSCOPIC SIGMOID COLON RESECTION (N/A Abdomen) Diagnosis:       Diverticulitis of large intestine without perforation or abscess without bleeding      (Diverticulitis of large intestine without perforation or abscess without bleeding [K57.32])    Surgeon:  Jaycob Anthony MD Provider:  Geovani Carrillo MD    Anesthesia Type:  Not recorded ASA Status:  2          Anesthesia Type: No value filed.  Last vitals  BP   108/63 (03/19/18 1605)   Temp   37.2 °C (98.9 °F) (03/19/18 1521)   Pulse   86 (03/19/18 1535)   Resp   14 (03/19/18 1605)     SpO2   99 % (03/19/18 1535)     Post Anesthesia Care and Evaluation    Patient location during evaluation: PACU  Patient participation: complete - patient participated  Level of consciousness: awake  Pain management: adequate  Airway patency: patent  Anesthetic complications: No anesthetic complications    Cardiovascular status: acceptable  Respiratory status: acceptable  Hydration status: acceptable

## 2018-03-19 NOTE — PLAN OF CARE
Problem: Patient Care Overview  Goal: Plan of Care Review  Outcome: Ongoing (interventions implemented as appropriate)   03/19/18 8983   Coping/Psychosocial   Plan of Care Reviewed With patient   Plan of Care Review   Progress improving   OTHER   Outcome Summary Lap sites CD&I. C/o pain 7/10, medicated with IV Dilaudid, ice pack to abdomen. Tolerating ice chips and clear liquids. Victoria catheter to be removed this evening. VSS.      Goal: Individualization and Mutuality  Outcome: Ongoing (interventions implemented as appropriate)    Goal: Discharge Needs Assessment  Outcome: Ongoing (interventions implemented as appropriate)    Goal: Interprofessional Rounds/Family Conf  Outcome: Ongoing (interventions implemented as appropriate)      Problem: Surgery Nonspecified (Adult)  Goal: Signs and Symptoms of Listed Potential Problems Will be Absent, Minimized or Managed (Surgery Nonspecified)  Outcome: Ongoing (interventions implemented as appropriate)    Goal: Anesthesia/Sedation Recovery  Outcome: Ongoing (interventions implemented as appropriate)

## 2018-03-19 NOTE — ANESTHESIA PREPROCEDURE EVALUATION
Anesthesia Evaluation     Patient summary reviewed and Nursing notes reviewed   no history of anesthetic complications:  NPO Solid Status: > 8 hours  NPO Liquid Status: < 2 hours           Airway   Mallampati: II  Dental - normal exam     Pulmonary - normal exam   (+) a smoker Current Smoked day of surgery,   Cardiovascular - negative cardio ROS and normal exam        Neuro/Psych  (+) psychiatric history Bipolar,     GI/Hepatic/Renal/Endo    (+)  GI bleeding,     Musculoskeletal     Abdominal    Substance History      OB/GYN          Other                        Anesthesia Plan    ASA 2     intravenous induction   Anesthetic plan and risks discussed with patient.

## 2018-03-19 NOTE — OP NOTE
PREOPERATIVE DIAGNOSIS:  Chronic recurrent diverticulitis    POSTOPERATIVE DIAGNOSIS (FINDINGS):  Same    PROCEDURE:  Laparoscopic sigmoid colectomy with mobilization of splenic flexure    SURGEON:  Jaycob Anthony MD    ASSISTANT:  Tara Worrell    ANESTHESIA:  General    EBL:  Minimal    SPECIMEN(S):  Sigmoid colon    DESCRIPTION:  In supine position under general anesthetic prepped and draped usual sterile manner.  Small incision made above the umbilicus and Veress needle inserted with upwards traction on the abdominal wall.  Abdomen insufflated to 15 mmHg.  5 mm Optiview trocar inserted.  Right lower quadrant 12, right midabdomen 5, and left lower quadrant 5 mm trochars introduced under direct visualization.  Inferior mesenteric vein was identified and divided between clips using the Harmonic scalpel.  Descending colon, distal transverse colon, and splenic flexure completely mobilized with the Harmonic scalpel identified and avoiding the ureter throughout the dissection.  The remainder of the descending colon and sigmoid colon were then mobilized along peritoneal attachments in the presacral plane was entered and developed.  Inferior mesenteric artery was identified, isolated, and divided with the vascular loaded stapler after identifying and again avoiding the left ureter.  Peritoneum opened on either side of the rectum for further mobilization and the rectum was skeletonized at the rectosigmoid junction with the Harmonic scalpel.  Rectum was then divided with the blue loaded stapler.  Left lower quadrant incision was extended after infiltrating more local and in a muscle-splitting manner the peritoneal cavity was entered and a small wound protector was inserted.  The bowel was exteriorized and a point of transection on the mid descending colon was chosen where there was excellent dopplerable arterial flow and no evidence of diverticulosis or previous diverticulitis.  The remaining mesentery there was taken with  the Harmonic scalpel and the colon was divided with electrocautery.  A 29 mm anvil was inserted and pursestring sutured in with 2-0 Prolene.  The bowel was returned to the peritoneal cavity.  The fascia was closed with running 0 PDS.  Abdomen was reinsufflated and the stapler was inserted transrectally.  The 2 ends were brought together and fired.  There were 2 complete donuts in the stapling device and an airtight anastomosis was confirmed within a saline filled pelvis.  There was no tension on the anastomosis.  Abdomen was inspected and excellent hemostasis was noted.  CO2 was released and the fascia of the 12 mm trocar site closed with 0 Vicryl.  Skin edges 5-0 Vicryl subcuticular.  Dermabond applied.  Tolerated well, stable to recovery area.    Jaycob Anthony M.D.

## 2018-03-19 NOTE — H&P
SUMMARY (A/P):    57-year-old lady with medically refractory recurrent diverticulitis and refractory left lower quadrant abdominal pain.  Understands her options and wishes to proceed with laparoscopic sigmoid colectomy.  Understands nature the procedure and the risks including but not limited to bleeding, infection, conversion to open procedure, and anastomotic leak requiring reoperation and temporary ostomy.  Also understands increased surgical complication risks associated with tobacco use.      CC:  Diverticulitis    HPI:  57-year-old lady with refractory left lower quadrant abdominal pain and abnormal endoscopic and radiographic workup as outlined below.    PHYSICAL EXAM:   Constitutional: Well-developed well-nourished, no acute distress  Vital signs: /76, HR 85, RR 18, T 97.6, weight 129 pounds, height 68 inches, BMI 19.7  Respiratory: Clear to auscultation  Cardiovascular: Regular rate, no jugular venous distention  Gastrointestinal: Soft, mild tenderness left lower quadrant  Skin:  Warm, dry, no rash on visualized skin surfaces  Musculoskeletal: Symmetric strength, normal gait  Psychiatric: Alert and oriented ×3, normal affect     ALLERGIES: reviewed, in Epic    MEDICATIONS: reviewed, in Epic    PMH:    Endometrial cancer age 26  Melanoma anterior neck 2005  Atypical hyperplasia right breast 2011    PSH:    -Right breast lumpectomy 2011  -Hysterectomy with bilateral salpingo-oophorectomy age 26  -Appendectomy    FAMILY HISTORY:    Negative for colorectal cancer    SOCIAL HISTORY:   Pack per day tobacco use  Occasional alcohol use    RADIOLOGY/ENDOSCOPY:    -Barium enema 2/7/2018 demonstrated redundancy, tortuosity, and diverticular changes of the sigmoid colon leading to limited evaluation of sigmoid on this exam  -Colonoscopy 1/17/2018 marked sigmoid diverticulosis with angulation precluding passage of the colonoscope beyond sigmoid colon  -CT abdomen pelvis 12/5/2017 sigmoid wall thickening, unable  to exclude diverticulitis    JAG COLE M.D.

## 2018-03-19 NOTE — ANESTHESIA PROCEDURE NOTES
Airway  Urgency: elective    Date/Time: 3/19/2018 1:54 PM  Airway not difficult    General Information and Staff    Patient location during procedure: OR  Anesthesiologist: MADHAV TURK  CRNA: ZEYAD CULLEN    Indications and Patient Condition  Indications for airway management: airway protection    Preoxygenated: yes  MILS maintained throughout  Mask difficulty assessment: 2 - vent by mask + OA or adjuvant +/- NMBA    Final Airway Details  Final airway type: endotracheal airway      Successful airway: ETT  Cuffed: yes   Successful intubation technique: direct laryngoscopy  Facilitating devices/methods: intubating stylet  Endotracheal tube insertion site: oral  Blade: Mani  Blade size: #3  ETT size: 7.0 mm  Cormack-Lehane Classification: grade IIa - partial view of glottis  Placement verified by: chest auscultation and capnometry   Cuff volume (mL): 7  Measured from: lips  ETT to lips (cm): 21  Number of attempts at approach: 1    Additional Comments  Patient in OR. Monitors on. BLVS. Pre 02 100%. SIVI. DL x1. DVVC. Atraumatic placement of ETT. Placement verified with ETC02 and BBS. ETT secured. Teeth/lips in pre-op condition.

## 2018-03-20 LAB
ANION GAP SERPL CALCULATED.3IONS-SCNC: 7.7 MMOL/L
BASOPHILS # BLD AUTO: 0.02 10*3/MM3 (ref 0–0.2)
BASOPHILS NFR BLD AUTO: 0.1 % (ref 0–1.5)
BUN BLD-MCNC: 7 MG/DL (ref 6–20)
BUN/CREAT SERPL: 10.8 (ref 7–25)
CALCIUM SPEC-SCNC: 9.2 MG/DL (ref 8.6–10.5)
CHLORIDE SERPL-SCNC: 104 MMOL/L (ref 98–107)
CO2 SERPL-SCNC: 27.3 MMOL/L (ref 22–29)
CREAT BLD-MCNC: 0.65 MG/DL (ref 0.57–1)
CYTO UR: NORMAL
DEPRECATED RDW RBC AUTO: 49.5 FL (ref 37–54)
EOSINOPHIL # BLD AUTO: 0.04 10*3/MM3 (ref 0–0.7)
EOSINOPHIL NFR BLD AUTO: 0.3 % (ref 0.3–6.2)
ERYTHROCYTE [DISTWIDTH] IN BLOOD BY AUTOMATED COUNT: 14.2 % (ref 11.7–13)
GFR SERPL CREATININE-BSD FRML MDRD: 95 ML/MIN/1.73
GLUCOSE BLD-MCNC: 106 MG/DL (ref 65–99)
HCT VFR BLD AUTO: 41.3 % (ref 35.6–45.5)
HGB BLD-MCNC: 12.9 G/DL (ref 11.9–15.5)
IMM GRANULOCYTES # BLD: 0.03 10*3/MM3 (ref 0–0.03)
IMM GRANULOCYTES NFR BLD: 0.2 % (ref 0–0.5)
LAB AP CASE REPORT: NORMAL
LYMPHOCYTES # BLD AUTO: 2.73 10*3/MM3 (ref 0.9–4.8)
LYMPHOCYTES NFR BLD AUTO: 18.5 % (ref 19.6–45.3)
Lab: NORMAL
MCH RBC QN AUTO: 29.8 PG (ref 26.9–32)
MCHC RBC AUTO-ENTMCNC: 31.2 G/DL (ref 32.4–36.3)
MCV RBC AUTO: 95.4 FL (ref 80.5–98.2)
MONOCYTES # BLD AUTO: 1.6 10*3/MM3 (ref 0.2–1.2)
MONOCYTES NFR BLD AUTO: 10.9 % (ref 5–12)
NEUTROPHILS # BLD AUTO: 10.3 10*3/MM3 (ref 1.9–8.1)
NEUTROPHILS NFR BLD AUTO: 70 % (ref 42.7–76)
PATH REPORT.FINAL DX SPEC: NORMAL
PATH REPORT.GROSS SPEC: NORMAL
PLATELET # BLD AUTO: 268 10*3/MM3 (ref 140–500)
PMV BLD AUTO: 10.2 FL (ref 6–12)
POTASSIUM BLD-SCNC: 4.2 MMOL/L (ref 3.5–5.2)
RBC # BLD AUTO: 4.33 10*6/MM3 (ref 3.9–5.2)
SODIUM BLD-SCNC: 139 MMOL/L (ref 136–145)
WBC NRBC COR # BLD: 14.72 10*3/MM3 (ref 4.5–10.7)

## 2018-03-20 PROCEDURE — 25010000002 HYDROMORPHONE HCL PF 500 MG/50ML SOLUTION: Performed by: SURGERY

## 2018-03-20 PROCEDURE — 85025 COMPLETE CBC W/AUTO DIFF WBC: CPT | Performed by: SURGERY

## 2018-03-20 PROCEDURE — 25010000002 CEFOXITIN PER 1 G: Performed by: SURGERY

## 2018-03-20 PROCEDURE — 25010000002 KETOROLAC TROMETHAMINE PER 15 MG: Performed by: SURGERY

## 2018-03-20 PROCEDURE — 80048 BASIC METABOLIC PNL TOTAL CA: CPT | Performed by: SURGERY

## 2018-03-20 PROCEDURE — 25010000002 ENOXAPARIN PER 10 MG: Performed by: SURGERY

## 2018-03-20 RX ORDER — KETOROLAC TROMETHAMINE 30 MG/ML
15 INJECTION, SOLUTION INTRAMUSCULAR; INTRAVENOUS EVERY 6 HOURS
Status: DISCONTINUED | OUTPATIENT
Start: 2018-03-20 | End: 2018-03-21 | Stop reason: HOSPADM

## 2018-03-20 RX ADMIN — HYDROCODONE BITARTRATE AND ACETAMINOPHEN 2 TABLET: 5; 325 TABLET ORAL at 01:08

## 2018-03-20 RX ADMIN — HYDROCODONE BITARTRATE AND ACETAMINOPHEN 2 TABLET: 5; 325 TABLET ORAL at 17:48

## 2018-03-20 RX ADMIN — KETOROLAC TROMETHAMINE 15 MG: 30 INJECTION, SOLUTION INTRAMUSCULAR at 08:26

## 2018-03-20 RX ADMIN — ENOXAPARIN SODIUM 30 MG: 30 INJECTION SUBCUTANEOUS at 08:27

## 2018-03-20 RX ADMIN — HYDROCODONE BITARTRATE AND ACETAMINOPHEN 2 TABLET: 5; 325 TABLET ORAL at 06:09

## 2018-03-20 RX ADMIN — CEFOXITIN SODIUM 2 G: 2 POWDER, FOR SOLUTION INTRAVENOUS at 02:38

## 2018-03-20 RX ADMIN — HYDROCODONE BITARTRATE AND ACETAMINOPHEN 2 TABLET: 5; 325 TABLET ORAL at 09:54

## 2018-03-20 RX ADMIN — HYDROMORPHONE HYDROCHLORIDE 0.5 MG: 10 INJECTION INTRAMUSCULAR; INTRAVENOUS; SUBCUTANEOUS at 11:49

## 2018-03-20 RX ADMIN — CEFOXITIN SODIUM 2 G: 2 POWDER, FOR SOLUTION INTRAVENOUS at 06:32

## 2018-03-20 RX ADMIN — HYDROCODONE BITARTRATE AND ACETAMINOPHEN 2 TABLET: 5; 325 TABLET ORAL at 13:44

## 2018-03-20 RX ADMIN — HYDROMORPHONE HYDROCHLORIDE 0.5 MG: 10 INJECTION INTRAMUSCULAR; INTRAVENOUS; SUBCUTANEOUS at 06:46

## 2018-03-20 RX ADMIN — HYDROCODONE BITARTRATE AND ACETAMINOPHEN 2 TABLET: 5; 325 TABLET ORAL at 21:55

## 2018-03-20 RX ADMIN — HYDROMORPHONE HYDROCHLORIDE 0.5 MG: 10 INJECTION INTRAMUSCULAR; INTRAVENOUS; SUBCUTANEOUS at 00:21

## 2018-03-20 RX ADMIN — KETOROLAC TROMETHAMINE 15 MG: 30 INJECTION, SOLUTION INTRAMUSCULAR at 20:04

## 2018-03-20 RX ADMIN — KETOROLAC TROMETHAMINE 15 MG: 30 INJECTION, SOLUTION INTRAMUSCULAR at 15:12

## 2018-03-20 RX ADMIN — HYDROMORPHONE HYDROCHLORIDE 0.5 MG: 10 INJECTION INTRAMUSCULAR; INTRAVENOUS; SUBCUTANEOUS at 20:28

## 2018-03-20 RX ADMIN — SODIUM CHLORIDE 75 ML/HR: 9 INJECTION, SOLUTION INTRAVENOUS at 00:27

## 2018-03-20 NOTE — PLAN OF CARE
Problem: Patient Care Overview  Goal: Plan of Care Review  Outcome: Ongoing (interventions implemented as appropriate)   03/20/18 1813   Coping/Psychosocial   Plan of Care Reviewed With patient;spouse   Plan of Care Review   Progress improving   OTHER   Outcome Summary Lap sites x4 clean and dry. IV pain medication given x1 and PO pain medication given x2. ice pack in place on abdomen. Tolerating diet well. urine output good since mcclellan removed. ambulated in the halls. VSS. Currently resting well.      Goal: Individualization and Mutuality  Outcome: Ongoing (interventions implemented as appropriate)    Goal: Discharge Needs Assessment  Outcome: Ongoing (interventions implemented as appropriate)    Goal: Interprofessional Rounds/Family Conf  Outcome: Ongoing (interventions implemented as appropriate)      Problem: Surgery Nonspecified (Adult)  Goal: Signs and Symptoms of Listed Potential Problems Will be Absent, Minimized or Managed (Surgery Nonspecified)  Outcome: Ongoing (interventions implemented as appropriate)    Goal: Anesthesia/Sedation Recovery  Outcome: Ongoing (interventions implemented as appropriate)

## 2018-03-20 NOTE — PLAN OF CARE
Problem: Patient Care Overview  Goal: Plan of Care Review  Outcome: Ongoing (interventions implemented as appropriate)   03/20/18 9450   Coping/Psychosocial   Plan of Care Reviewed With patient;spouse   Plan of Care Review   Progress improving   OTHER   Outcome Summary Lap sites CD&I with small amount of bruising. C/o moderate abdominal pain, medicated with PRN Dilaudid & Lortab, scheduled Toradol. Tolerating regular diet. Ambulating in borges. Voiding without difficulty. VSS.     Goal: Individualization and Mutuality  Outcome: Ongoing (interventions implemented as appropriate)    Goal: Discharge Needs Assessment  Outcome: Ongoing (interventions implemented as appropriate)    Goal: Interprofessional Rounds/Family Conf  Outcome: Ongoing (interventions implemented as appropriate)      Problem: Surgery Nonspecified (Adult)  Goal: Signs and Symptoms of Listed Potential Problems Will be Absent, Minimized or Managed (Surgery Nonspecified)  Outcome: Ongoing (interventions implemented as appropriate)    Goal: Anesthesia/Sedation Recovery  Outcome: Ongoing (interventions implemented as appropriate)

## 2018-03-20 NOTE — PROGRESS NOTES
Afebrile vital signs stable  Labs in good order  Awake and alert, expected amount of pain, no nausea or vomiting  Abdomen soft, incisions look good    -Advance diet as tolerated  -Await bowel function  -Add Toradol

## 2018-03-21 VITALS
HEART RATE: 81 BPM | OXYGEN SATURATION: 97 % | WEIGHT: 129.38 LBS | TEMPERATURE: 98.1 F | DIASTOLIC BLOOD PRESSURE: 62 MMHG | HEIGHT: 68 IN | SYSTOLIC BLOOD PRESSURE: 103 MMHG | BODY MASS INDEX: 19.61 KG/M2 | RESPIRATION RATE: 16 BRPM

## 2018-03-21 PROCEDURE — 25010000002 KETOROLAC TROMETHAMINE PER 15 MG: Performed by: SURGERY

## 2018-03-21 PROCEDURE — 25010000002 ENOXAPARIN PER 10 MG: Performed by: SURGERY

## 2018-03-21 RX ORDER — HYDROCODONE BITARTRATE AND ACETAMINOPHEN 5; 325 MG/1; MG/1
2 TABLET ORAL EVERY 4 HOURS PRN
Qty: 30 TABLET | Refills: 0 | Status: SHIPPED | OUTPATIENT
Start: 2018-03-21 | End: 2018-03-23 | Stop reason: SDUPTHER

## 2018-03-21 RX ADMIN — HYDROCODONE BITARTRATE AND ACETAMINOPHEN 2 TABLET: 5; 325 TABLET ORAL at 05:42

## 2018-03-21 RX ADMIN — HYDROCODONE BITARTRATE AND ACETAMINOPHEN 2 TABLET: 5; 325 TABLET ORAL at 01:38

## 2018-03-21 RX ADMIN — HYDROCODONE BITARTRATE AND ACETAMINOPHEN 2 TABLET: 5; 325 TABLET ORAL at 10:20

## 2018-03-21 RX ADMIN — KETOROLAC TROMETHAMINE 15 MG: 30 INJECTION, SOLUTION INTRAMUSCULAR at 08:42

## 2018-03-21 RX ADMIN — KETOROLAC TROMETHAMINE 15 MG: 30 INJECTION, SOLUTION INTRAMUSCULAR at 01:31

## 2018-03-21 RX ADMIN — ENOXAPARIN SODIUM 30 MG: 30 INJECTION SUBCUTANEOUS at 08:42

## 2018-03-21 NOTE — PROGRESS NOTES
Case Management Discharge Note         Destination     No service coordination in this encounter.      Durable Medical Equipment     No service coordination in this encounter.      Dialysis/Infusion     No service coordination in this encounter.      Home Medical Care     No service coordination in this encounter.      Social Care     No service coordination in this encounter.        Other:  (Private Vehicle)    Final Discharge Disposition Code: 01 - home or self-care

## 2018-03-21 NOTE — DISCHARGE SUMMARY
DATE OF ADMIT:  3/19/2018    DATE OF DISCHARGE:  3/21/2018    DIAGNOSIS:  Chronic recurrent diverticulitis    FINAL PATHOLOGY:  Chronic diverticulitis    PROCEDURES:  Laparoscopic sigmoid colectomy 3/19/2018    SUMMARY OF HOSPITAL COURSE:   Uneventful postop course. Tolerating diet, incisions in good order and afebrile with stable vital signs at discharge. Prescribed pain medicine and follow up appointment made.    Jaycob Anthony M.D.

## 2018-03-23 RX ORDER — HYDROCODONE BITARTRATE AND ACETAMINOPHEN 5; 325 MG/1; MG/1
TABLET ORAL
Qty: 30 TABLET | Refills: 0 | Status: SHIPPED | OUTPATIENT
Start: 2018-03-23 | End: 2021-09-08 | Stop reason: ALTCHOICE

## 2018-03-29 ENCOUNTER — OFFICE VISIT (OUTPATIENT)
Dept: SURGERY | Facility: CLINIC | Age: 58
End: 2018-03-29

## 2018-03-29 DIAGNOSIS — Z09 FOLLOW UP: Primary | ICD-10-CM

## 2018-03-29 PROCEDURE — 99024 POSTOP FOLLOW-UP VISIT: CPT | Performed by: SURGERY

## 2018-03-29 NOTE — PROGRESS NOTES
Laparoscopic sigmoid colectomy 3/19/2018    Pathology: Diverticulitis    Office visit: Incisions are healing well and she is doing very well and already noticing improvement in preoperative symptoms.  She had a sigmoidoscopy and barium enema preoperatively which showed no polyps or other abnormalities other than the diverticular changes in the colon.  I recommended a five-year surveillance colonoscopy.

## 2018-10-11 ENCOUNTER — OFFICE VISIT (OUTPATIENT)
Dept: SURGERY | Facility: CLINIC | Age: 58
End: 2018-10-11

## 2018-10-11 VITALS — OXYGEN SATURATION: 98 % | BODY MASS INDEX: 18.79 KG/M2 | HEART RATE: 80 BPM | HEIGHT: 68 IN | WEIGHT: 124 LBS

## 2018-10-11 DIAGNOSIS — R10.31 RIGHT LOWER QUADRANT PAIN: ICD-10-CM

## 2018-10-11 DIAGNOSIS — K59.09 OTHER CONSTIPATION: Primary | ICD-10-CM

## 2018-10-11 PROCEDURE — 99213 OFFICE O/P EST LOW 20 MIN: CPT | Performed by: SURGERY

## 2018-10-11 RX ORDER — CIPROFLOXACIN 500 MG/1
500 TABLET, FILM COATED ORAL EVERY 12 HOURS SCHEDULED
COMMUNITY
Start: 2018-10-08 | End: 2020-11-04

## 2018-10-11 NOTE — PROGRESS NOTES
SUMMARY (A/P):    58-year-old lady with right lower quadrant pain and constipation, pain is worse with constipation.  Given that she had a recent negative CT scan and a good-quality-negative preoperative barium enema, I think the appropriate next step is to treat her constipation and see if this improves her symptoms.  Along those lines we will start with MiraLAX taken on a daily basis and she'll let me know in 1 week whether this helps.  Further recommendations will follow pending her response to MiraLAX.  Ultimately, if her pain does not resolve with treatment of her constipation then we will need to do a colonoscopy.      CC:    Right lower quadrant pain and constipation    HPI:    58-year-old lady presents with 6 week history of right lower quadrant pain that is intermittent and ranges from not present to moderately severe.  It is worse when she is constipated.  Her bowel movements have changed becoming more narrow and infrequent.  This is associated with bloating.    PSH:    -Right breast lumpectomy 2011  -Hysterectomy with bilateral salpingo-oophorectomy age 26  -Appendectomy  -Laparoscopic sigmoid colectomy for diverticulitis March 2018    PMH:    Endometrial cancer age 26  Melanoma anterior neck 2005  Atypical hyperplasia right breast 2011  Diverticulitis necessitating surgery 2018    FAMILY HISTORY:    Negative for colorectal cancer    SOCIAL HISTORY:   Quit long-standing tobacco use 3 months ago  Occasional alcohol use    ALLERGIES: reviewed, in Epic    MEDICATIONS: reviewed, in Epic    ROS:  No chest pain or shortness of air.  All other systems reviewed and negative other than presenting complaints.    RADIOLOGY/ENDOSCOPY:    -Flexible sigmoidoscopy to 50 cm preoperatively was normal but unable to negotiate sigmoid colon due to diverticulitis  -Preoperative barium enema 2/7/2018 demonstrated redundancy, tortuosity, and diverticular changes in sigmoid colon with no polyps or other mucosal mass  detected  -CT abdomen pelvis [facility] 10/9/2018 showed no acute abnormality    PHYSICAL EXAM:   Constitutional: Well-developed well-nourished, no acute distress  Vital signs: Heart rate 80, weight 124 pounds, height 67 inches, BMI 19.1  Eyes: Conjunctiva normal, sclera nonicteric  ENMT: Hearing grossly normal, oral mucosa moist  Neck: Supple, no palpable mass, normal thyroid, trachea midline  Respiratory: Clear to auscultation, normal inspiratory effort  Cardiovascular: Regular rate, no murmur, no carotid bruit, no peripheral edema, no jugular venous distention  Gastrointestinal: Soft, mild to moderate tenderness to deep palpation right lower quadrant with no palpable mass, no hepatosplenomegaly, negative for hernia, bowel sounds normal  Lymphatics (palpable nodes):  cervical-negative, inguinal-negative  Skin:  Warm, dry, no rash on visualized skin surfaces  Musculoskeletal: Symmetric strength, normal gait  Psychiatric: Alert and oriented ×3, normal affect     JAG COLE M.D.

## 2020-03-09 ENCOUNTER — OFFICE VISIT (OUTPATIENT)
Dept: ORTHOPEDIC SURGERY | Facility: CLINIC | Age: 60
End: 2020-03-09

## 2020-03-09 VITALS — HEIGHT: 67 IN | WEIGHT: 131.4 LBS | BODY MASS INDEX: 20.62 KG/M2 | TEMPERATURE: 98.1 F

## 2020-03-09 DIAGNOSIS — M17.11 PRIMARY OSTEOARTHRITIS OF RIGHT KNEE: Primary | ICD-10-CM

## 2020-03-09 PROCEDURE — 99203 OFFICE O/P NEW LOW 30 MIN: CPT | Performed by: ORTHOPAEDIC SURGERY

## 2020-03-09 RX ORDER — VENLAFAXINE HYDROCHLORIDE 75 MG/1
1 CAPSULE, EXTENDED RELEASE ORAL
COMMUNITY
End: 2021-04-16

## 2020-03-09 RX ORDER — MULTIVIT WITH MINERALS/LUTEIN
1 TABLET ORAL
COMMUNITY
End: 2021-07-15

## 2020-03-09 RX ORDER — GABAPENTIN 600 MG/1
1 TABLET ORAL
COMMUNITY
End: 2020-11-04

## 2020-03-09 RX ORDER — DIVALPROEX SODIUM 250 MG/1
1 TABLET, DELAYED RELEASE ORAL
COMMUNITY
End: 2020-11-04

## 2020-03-09 NOTE — PROGRESS NOTES
NEW VISIT    Patient: Janis Rosa  ?  YOB: 1960    MRN: [id number]  ?  Chief Complaint   Patient presents with   • Right Knee - Pain   • Establish Care      ?  HPI: NEW R KNEE MRI  Janis Cormier presents with right knee pain and discomfort for 10 months.  She denies any history of trauma to the knee.  Most of the pain is on the medial aspect of the knee.  She describes her pain is moderately severe and her pain is intermittent.  Her pain is related to activities.  She works as a  and by the end of the shift she has a lot of pain and discomfort.  She tends to get some swelling in the knee which makes it tight for her knee to flex and to squat on the ground.  Symptoms are worse when she is walking briskly or running.  She has tried anti-inflammatory medication and physical therapy but it does not seem to be helping her symptoms.  She is concerned about a Baker's cyst in the popliteal fossa which is somewhat symptomatic for the patient as well.      Pain Location: right knee  Radiation: none  Quality: dull, aching  Intensity/Severity: moderate  Duration: 10 months  Onset quality: gradual   Timing: intermittent  Aggravating Factors: going up and down stairs, rising after sitting, squatting  Alleviating Factors: NSAIDs, brace, stretching/PT/OT  Previous Episodes: yes  Associated Symptoms: pain, swelling, decreased ROM, decreased strength  ADLs Affected: grooming/hygiene/toileting/personal care, ambulating, work related activities  Previous Treatment: Anti-inflammatory medication and physical therapy.    This patient is a new patient.  This problem is new to this examiner.      Allergies:   Allergies   Allergen Reactions   • Morphine And Related Rash   • Sulfa Antibiotics Rash       Medications:   Home Medications:  Current Outpatient Medications on File Prior to Visit   Medication Sig   • ALPRAZolam (XANAX) 0.5 MG tablet Take 0.5 mg by mouth Daily As Needed for Anxiety.   • ascorbic acid  (VITAMIN C) 1000 MG tablet Take 1 tablet by mouth.   • divalproex (DEPAKOTE) 250 MG DR tablet Take 1 tablet by mouth.   • gabapentin (NEURONTIN) 600 MG tablet Take 1 tablet by mouth.   • venlafaxine XR (EFFEXOR-XR) 75 MG 24 hr capsule Take 1 capsule by mouth.   • ciprofloxacin (CIPRO) 500 MG tablet Take 500 mg by mouth Every 12 (Twelve) Hours.   • HYDROcodone-acetaminophen (NORCO) 5-325 MG per tablet TAKE 2 TABLETS BY MOUTH EVERY FOUR HOURS AS NEEDED FOR MODERATE PAIN     No current facility-administered medications on file prior to visit.      Current Medications:  Scheduled Meds:  PRN Meds:.    I have reviewed the patient's medical history in detail and updated the computerized patient record.  Review and summarization of old records include:    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    s/p excisional biopsy, rt breast w/ mammogram needle localization   • Bipolar 1 disorder (CMS/HCC)    • Diverticulitis    • Endometrial cancer (CMS/HCC)    • Melanoma (CMS/HCC)    • Panic attack    • Transverse myelitis (CMS/HCC)     HISTORY OF YEARS AGO     Past Surgical History:   Procedure Laterality Date   • APPENDECTOMY     • BREAST BIOPSY Right 03/02/2011    right breast stereotactic biopsy   • BREAST EXCISIONAL BIOPSY Right 03/11/2011    Excision biopsy, right breast with mammogram needle localization-Dr. Robert Hurley   • COLON RESECTION N/A 3/19/2018    Procedure: LAPAROSCOPIC SIGMOID COLON RESECTION;  Surgeon: Jaycob Anthony MD;  Location: [facility] OR;  Service: General   • COLONOSCOPY N/A 1/17/2018    Procedure: FLEXIBLE SIGMOIDOSCPY TO 50 CM;  Surgeon: Jaycob Anthony MD;  Location: [facility] ENDOSCOPY;  Service:    • HYSTERECTOMY     • SHOULDER SURGERY     • SKIN CANCER EXCISION      melanoma removed from neck   • TONSILLECTOMY AND ADENOIDECTOMY       Social History     Occupational History   • Not on file   Tobacco Use   • Smoking status: Current  "Every Day Smoker     Packs/day: 0.25     Years: 30.00     Pack years: 7.50     Types: Cigarettes     Start date: 12/14/1987   • Smokeless tobacco: Never Used   • Tobacco comment: using a vap cig   Substance and Sexual Activity   • Alcohol use: Yes     Comment: socially   • Drug use: No   • Sexual activity: Defer      Family History   Problem Relation Age of Onset   • COPD Mother    • Heart disease Mother    • Breast cancer Maternal Grandfather    • Malig Hyperthermia Neg Hx          Review of Systems   Constitutional: Negative.  Negative for fever.   HENT: Negative.    Eyes: Negative.    Respiratory: Negative.    Cardiovascular: Negative.    Gastrointestinal: Negative.    Endocrine: Negative.    Genitourinary: Negative.    Musculoskeletal: Positive for arthralgias, gait problem and joint swelling.   Skin: Negative.  Negative for rash and wound.   Allergic/Immunologic: Negative.    Neurological: Negative for numbness.   Hematological: Negative.    Psychiatric/Behavioral: Negative.           Wt Readings from Last 3 Encounters:   03/09/20 59.6 kg (131 lb 6.4 oz)   10/11/18 56.2 kg (124 lb)   03/19/18 58.7 kg (129 lb 6 oz)     Ht Readings from Last 3 Encounters:   03/09/20 170.2 cm (67\")   10/11/18 171.5 cm (67.5\")   03/19/18 172.7 cm (68\")     Body mass index is 20.58 kg/m².  Facility age limit for growth percentiles is 20 years.  Vitals:    03/09/20 0956   Temp: 98.1 °F (36.7 °C)         Physical Exam  Constitutional: Patient is oriented to person, place, and time. Appears well-developed and well-nourished.   HENT:   Head: Normocephalic and atraumatic.   Eyes: Conjunctivae and EOM are normal. Pupils are equal, round, and reactive to light.   Cardiovascular: Normal rate, regular rhythm, normal heart sounds and intact distal pulses.   Pulmonary/Chest: Effort normal and breath sounds normal.   Musculoskeletal:   See detailed exam below   Neurological: Alert and oriented to person, place, and time. No sensory deficit. " Coordination normal.   Skin: Skin is warm and dry. Capillary refill takes less than 2 seconds. No rash noted. No erythema.   Psychiatric: Patient has a normal mood and affect. Her behavior is normal. Judgment and thought content normal.   Nursing note and vitals reviewed.      Ortho Exam:   Right knee (varus). Patient has crepitus throughout range of motion. Positive patellar grind test. Mild effusion. Lachman is negative. Pivot shift is negative. Anterior and posterior drawer signs are negative. Significant joint line tenderness is noted on the medial aspect of the knee. Patient has a varus orientation of the knee. There is fullness and tenderness in the Popliteal fossa. Mild distention of a Popliteal cyst is noted in this location. Range of motion in flexion is from 0-110 degrees. Neurovascular status is intact.  Dorsalis pedis and posterior tibial artery pulses are palpable. Common peroneal nerve function is well preserved. Patient's gait is cautious and antalgic. Skin and soft tissues are mildly swollen, consistent with synovitis and effusion. The patient has a significant limp with the first few steps after starting the gait cycle. Getting out of a chair takes a lot of effort due to pain on knee flexion.       Diagnostics:  Reviewed MRI report of right knee, summary of impression below:  MRI report of the knee is available today in the office.  Images of the right knee show some degenerative changes in the anterior cruciate ligament which is otherwise intact.  There is mild to moderate cartilage loss on the medial compartment of the knee.  The patellofemoral distance is well-preserved.  There is a Baker's cyst in the popliteal fossa measuring 3.7 x 1.6 cm.  My recommendation for nonoperative management is based on the clinical correlation and the MRI findings.    Assessment:  Janis was seen today for establish care and pain.    Diagnoses and all orders for this visit:    Primary osteoarthritis of right  knee    Other orders  -     SCANNED - IMAGING          Procedures  ?    Plan    · Compression/brace to the need to prevent it from buckling and giving out.  · Intra-articular injections of steroid and Visco supplementation discussed and offered to the patient.  · There is no indication to resect the Baker's cyst at this point.  · Her symptoms are not advanced enough to consider either partial or total knee arthroplasty.  · Rest, ice, compression, and elevation (RICE) therapy  · Stretching and strengthening exercises of the quads and the hamstrings.  · Tylenol 500-1000mg by mouth every 6 hours as needed for pain   · Follow up in 6 month(s)    Date of encounter: 03/09/2020   Lorenzo Nam MD

## 2020-03-19 PROBLEM — M17.11 PRIMARY OSTEOARTHRITIS OF RIGHT KNEE: Status: ACTIVE | Noted: 2020-03-19

## 2020-11-04 ENCOUNTER — OFFICE VISIT (OUTPATIENT)
Dept: FAMILY MEDICINE CLINIC | Facility: CLINIC | Age: 60
End: 2020-11-04

## 2020-11-04 VITALS
RESPIRATION RATE: 17 BRPM | HEART RATE: 103 BPM | TEMPERATURE: 98.2 F | OXYGEN SATURATION: 96 % | BODY MASS INDEX: 22.16 KG/M2 | HEIGHT: 67 IN | WEIGHT: 141.2 LBS | DIASTOLIC BLOOD PRESSURE: 70 MMHG | SYSTOLIC BLOOD PRESSURE: 102 MMHG

## 2020-11-04 DIAGNOSIS — H65.01 NON-RECURRENT ACUTE SEROUS OTITIS MEDIA OF RIGHT EAR: Primary | ICD-10-CM

## 2020-11-04 DIAGNOSIS — F41.9 ANXIETY: ICD-10-CM

## 2020-11-04 DIAGNOSIS — Z23 NEED FOR IMMUNIZATION AGAINST INFLUENZA: ICD-10-CM

## 2020-11-04 PROCEDURE — 90686 IIV4 VACC NO PRSV 0.5 ML IM: CPT | Performed by: NURSE PRACTITIONER

## 2020-11-04 PROCEDURE — 99203 OFFICE O/P NEW LOW 30 MIN: CPT | Performed by: NURSE PRACTITIONER

## 2020-11-04 PROCEDURE — 90471 IMMUNIZATION ADMIN: CPT | Performed by: NURSE PRACTITIONER

## 2020-11-04 RX ORDER — AMOXICILLIN 500 MG/1
875 CAPSULE ORAL 2 TIMES DAILY
Qty: 28 CAPSULE | Refills: 0 | Status: SHIPPED | OUTPATIENT
Start: 2020-11-04 | End: 2020-11-11

## 2020-11-04 RX ORDER — FLUCONAZOLE 150 MG/1
150 TABLET ORAL ONCE
Qty: 1 TABLET | Refills: 0 | Status: SHIPPED | OUTPATIENT
Start: 2020-11-04 | End: 2020-11-04

## 2020-11-04 RX ORDER — DOXYCYCLINE 50 MG/1
50 CAPSULE ORAL 2 TIMES DAILY
COMMUNITY
Start: 2020-09-04 | End: 2021-03-16

## 2020-11-04 NOTE — PROGRESS NOTES
Subjective   Janis Rosa is a 60 y.o. female.     Chief Complaint   Patient presents with   • Establish Care   • Earache     right ear, draining, friday and saturday bubbling and draining, today and yesterday has been better, has antibiotic drops from ENT but couldn't get in to see him today    anxiety    Vitals:    11/04/20 0928   BP: 102/70   Pulse: 103   Resp: 17   Temp: 98.2 °F (36.8 °C)   SpO2: 96%        This is a 60-year-old female patient being seen today due to right ear pain, anxiety.  Patient reports right ear pain that started on Friday.  She is being followed by ENT.  She has a tube in her right ear.  She has been using Cipro drops at home with no relief.  She is afebrile.  She reports her pain at a 4 out of 10. she is currently on Xanax nightly due to anxiety.  She  has been started on Effexor which is working well.  She only use Xanax as needed.  She denies any increased problems today with anxiety or depression.       The following portions of the patient's history were reviewed and updated as appropriate: allergies, current medications, past family history, past medical history, past social history, past surgical history, and problem list.    Review of Systems   Constitutional: Negative for chills and fever.   HENT: Positive for ear discharge and ear pain. Negative for congestion, facial swelling, hearing loss, sinus pressure and sore throat.    Respiratory: Negative for shortness of breath.    Cardiovascular: Negative for chest pain.   Gastrointestinal: Negative for nausea.   Psychiatric/Behavioral: Negative for agitation, behavioral problems, suicidal ideas, depressed mood and stress.       Objective   Physical Exam  Constitutional:       Appearance: Normal appearance.   HENT:      Head: Normocephalic.      Right Ear: Ear canal and external ear normal.      Left Ear: Tympanic membrane, ear canal and external ear normal.      Ears:      Comments: Tube intact, erythema and pus noted to right TM      Mouth/Throat:      Mouth: Mucous membranes are moist.      Pharynx: No posterior oropharyngeal erythema.   Cardiovascular:      Rate and Rhythm: Normal rate and regular rhythm.   Pulmonary:      Effort: Pulmonary effort is normal.      Breath sounds: Normal breath sounds.   Abdominal:      General: Abdomen is flat. Bowel sounds are normal.      Palpations: Abdomen is soft.   Skin:     General: Skin is warm and dry.   Neurological:      Mental Status: She is alert and oriented to person, place, and time.   Psychiatric:         Mood and Affect: Mood normal.         Behavior: Behavior normal.           Assessment/Plan   Problems Addressed this Visit     None      Visit Diagnoses     Non-recurrent acute serous otitis media of right ear    -  Primary    Need for immunization against influenza        Relevant Orders    FluLaval Quad >6 Months (9120-1593) (Completed)    Anxiety          Diagnoses       Codes Comments    Non-recurrent acute serous otitis media of right ear    -  Primary ICD-10-CM: H65.01  ICD-9-CM: 381.01     Need for immunization against influenza     ICD-10-CM: Z23  ICD-9-CM: V04.81     Anxiety     ICD-10-CM: F41.9  ICD-9-CM: 300.00           I will start patient on Amoxil twice daily for 7 days for now.  She had a refill on her Xanax at the previous office.  Benny report is appropriate.  I will refill her Xanax next month as ordered.  She will return or contact ENT if symptoms worsen with her ear

## 2020-11-20 ENCOUNTER — OFFICE VISIT (OUTPATIENT)
Dept: FAMILY MEDICINE CLINIC | Facility: CLINIC | Age: 60
End: 2020-11-20

## 2020-11-20 DIAGNOSIS — R50.9 FEVER, UNSPECIFIED FEVER CAUSE: Primary | ICD-10-CM

## 2020-11-20 PROCEDURE — 99442 PR PHYS/QHP TELEPHONE EVALUATION 11-20 MIN: CPT | Performed by: NURSE PRACTITIONER

## 2020-11-20 NOTE — PROGRESS NOTES
Subjective   Janis Rosa is a 60 y.o. female who is being evaluated by telephone visit for headache and runny nose.  Patient reports headache and runny nose that started on Wednesday.  She does report fatigue and chills.  She reports mild fever today.  She does report being at a wedding and baby shower this past weekend with multiple people.  She is concerned with getting COVID-19..     History of Present Illness   You have chosen to receive care through a telephone visit. Do you consent to use a telephone visit for your medical care today? Yes        The following portions of the patient's history were reviewed and updated as appropriate: allergies, current medications, past family history, past medical history, past social history, past surgical history and problem list.    Review of Systems   Constitutional: Positive for activity change and fatigue. Negative for appetite change and fever.   HENT: Positive for congestion and postnasal drip. Negative for ear pain, sinus pain and sore throat.    Respiratory: Negative for cough, chest tightness and shortness of breath.    Cardiovascular: Negative for chest pain.       Objective   There were no vitals filed for this visit.  There is no height or weight on file to calculate BMI.    Physical Exam    Assessment/Plan   There are no diagnoses linked to this encounter.    Assessment and plan  60 y.o. female who is being evaluated by telephone visit for Headache and runny nose today.  Patient is concerns with possible exposure to COVID-19.  About 15 minutes spent reviewing the patient's chart and telephone visit, medical decision-making, and treatment plan.    We have discussed her symptoms and she is willing to get Covid test.  I will put that order in.  She is instructed to go the emergency room if symptoms worsen and develop shortness of breath

## 2021-02-01 ENCOUNTER — OFFICE VISIT (OUTPATIENT)
Dept: FAMILY MEDICINE CLINIC | Facility: CLINIC | Age: 61
End: 2021-02-01

## 2021-02-01 VITALS
OXYGEN SATURATION: 97 % | RESPIRATION RATE: 17 BRPM | TEMPERATURE: 97.3 F | SYSTOLIC BLOOD PRESSURE: 128 MMHG | BODY MASS INDEX: 22.85 KG/M2 | DIASTOLIC BLOOD PRESSURE: 78 MMHG | WEIGHT: 145.6 LBS | HEIGHT: 67 IN | HEART RATE: 112 BPM

## 2021-02-01 DIAGNOSIS — R30.0 DYSURIA: ICD-10-CM

## 2021-02-01 DIAGNOSIS — F41.9 ANXIETY: ICD-10-CM

## 2021-02-01 DIAGNOSIS — Z79.899 HIGH RISK MEDICATION USE: Primary | ICD-10-CM

## 2021-02-01 PROBLEM — B37.0 CANDIDA, ORAL: Status: ACTIVE | Noted: 2021-02-01

## 2021-02-01 PROCEDURE — 99214 OFFICE O/P EST MOD 30 MIN: CPT | Performed by: NURSE PRACTITIONER

## 2021-02-01 RX ORDER — ALPRAZOLAM 0.5 MG/1
0.5 TABLET ORAL DAILY PRN
Qty: 30 TABLET | Refills: 5 | Status: SHIPPED | OUTPATIENT
Start: 2021-02-01 | End: 2021-03-16

## 2021-02-01 NOTE — PROGRESS NOTES
Subjective   Janis Rosa is a 60 y.o. female.     Chief Complaint   Patient presents with   • Med Management     anxiety        Vitals:    02/01/21 1329   BP: 128/78   Pulse: 112   Resp: 17   Temp: 97.3 °F (36.3 °C)   SpO2: 97%        History of Present Illness   This is a 60-year-old female patient being seen today for anxiety, and dysuria.  Patient is currently on Xanax as needed for anxiety and has been for several years.  She is doing well with current regimen and is here for refill.  She also reports dysuria.  She reports her symptoms started several days ago.  She denies any fever chills.    The following portions of the patient's history were reviewed and updated as appropriate: allergies, current medications, past family history, past medical history, past social history, past surgical history, and problem list.    Review of Systems   Constitutional: Negative for chills and fever.   Genitourinary: Positive for dysuria and urgency. Negative for hematuria and pelvic pain.   Musculoskeletal: Negative for back pain.   Psychiatric/Behavioral: Positive for stress. Negative for sleep disturbance and suicidal ideas. The patient is nervous/anxious.        Objective   Physical Exam  Neck:      Musculoskeletal: Neck supple.   Cardiovascular:      Rate and Rhythm: Normal rate and regular rhythm.      Pulses: Normal pulses.      Heart sounds: Normal heart sounds.   Pulmonary:      Effort: Pulmonary effort is normal.      Breath sounds: Normal breath sounds.   Abdominal:      General: Bowel sounds are normal.      Palpations: Abdomen is soft.   Skin:     General: Skin is warm and dry.   Neurological:      Mental Status: She is alert and oriented to person, place, and time.           Assessment/Plan   Problems Addressed this Visit        Genitourinary and Reproductive     Dysuria    Relevant Orders    Urine Culture - Urine, Urine, Clean Catch       Infectious Diseases    Candida, oral       Mental Health    Anxiety     Relevant Medications    ALPRAZolam (XANAX) 0.5 MG tablet      Other Visit Diagnoses     High risk medication use    -  Primary    Relevant Orders    Compliance Drug Analysis, Ur - Urine, Clean Catch      Diagnoses       Codes Comments    High risk medication use    -  Primary ICD-10-CM: Z79.899  ICD-9-CM: V58.69     Candida, oral     ICD-10-CM: B37.0  ICD-9-CM: 112.0     Anxiety     ICD-10-CM: F41.9  ICD-9-CM: 300.00     Dysuria     ICD-10-CM: R30.0  ICD-9-CM: 788.1           I will obtain urine drug screen today.  Patient has signed contract and I will continue to fill her Xanax as ordered.  I will obtain urine culture today.  She will return to see me in 6 months or sooner if needed.    I spent a total of 30  minutes with the patient today including face-to-face encounter, reviewing data in the system, coordination of care with the nursing staff as well as consultants, documentation and entering orders.

## 2021-02-03 LAB
BACTERIA UR CULT: NO GROWTH
BACTERIA UR CULT: NORMAL

## 2021-02-04 LAB — DRUGS UR: NORMAL

## 2021-03-16 ENCOUNTER — OFFICE VISIT (OUTPATIENT)
Dept: FAMILY MEDICINE CLINIC | Facility: CLINIC | Age: 61
End: 2021-03-16

## 2021-03-16 VITALS
WEIGHT: 141 LBS | HEART RATE: 109 BPM | SYSTOLIC BLOOD PRESSURE: 118 MMHG | TEMPERATURE: 96.9 F | OXYGEN SATURATION: 99 % | DIASTOLIC BLOOD PRESSURE: 80 MMHG | BODY MASS INDEX: 22.13 KG/M2 | HEIGHT: 67 IN

## 2021-03-16 DIAGNOSIS — J02.9 SORE THROAT: Primary | ICD-10-CM

## 2021-03-16 DIAGNOSIS — F41.9 ANXIETY: ICD-10-CM

## 2021-03-16 PROCEDURE — 99213 OFFICE O/P EST LOW 20 MIN: CPT | Performed by: NURSE PRACTITIONER

## 2021-03-16 RX ORDER — OMEPRAZOLE 20 MG/1
20 CAPSULE, DELAYED RELEASE ORAL DAILY
Qty: 30 CAPSULE | Refills: 3 | Status: SHIPPED | OUTPATIENT
Start: 2021-03-16 | End: 2021-04-09 | Stop reason: DRUGHIGH

## 2021-03-16 RX ORDER — ALPRAZOLAM 0.5 MG/1
0.5 TABLET ORAL 2 TIMES DAILY PRN
Qty: 60 TABLET | Refills: 5 | Status: SHIPPED | OUTPATIENT
Start: 2021-03-16 | End: 2021-07-15 | Stop reason: SDUPTHER

## 2021-03-16 NOTE — PROGRESS NOTES
"Chief Complaint  Headache (4 days) and Difficulty Swallowing (4 days)    Subjective          Janis Rosa presents to [facility]  History of Present Illness   This is a 60-year-old female patient here today for evaluation of headache and sore throat and anxiety.  Patient recently returned from [place] after dealing with 2 family deaths.  Also she was just told her brother-in-law passed away yesterday.  She reports bad headache last week in the center of her forehead and now with burning in her throat.  She denies chest pain or shortness of breath no fever chills.  She does not drink caffeine.  Has not lost taste and smell.  She is currently on Xanax twice a day as needed for anxiety.  At last visit her Xanax got put in once daily by error.  Med agreement and urine is on file.      Objective   Vital Signs:   /80   Pulse 109   Temp 96.9 °F (36.1 °C)   Ht 170 cm (66.93\")   Wt 64 kg (141 lb)   SpO2 99%   BMI 22.13 kg/m²     Physical Exam  Vitals and nursing note reviewed. Exam conducted with a chaperone present.   Constitutional:       Appearance: Normal appearance.   HENT:      Head: Normocephalic.      Right Ear: Tympanic membrane normal.      Left Ear: Tympanic membrane normal.      Nose: Nose normal.      Mouth/Throat:      Mouth: Mucous membranes are moist.      Pharynx: No oropharyngeal exudate or posterior oropharyngeal erythema.   Neck:      Vascular: No carotid bruit.   Cardiovascular:      Rate and Rhythm: Normal rate and regular rhythm.      Pulses: Normal pulses.      Heart sounds: Normal heart sounds.   Pulmonary:      Effort: Pulmonary effort is normal.      Breath sounds: Normal breath sounds.   Abdominal:      General: Bowel sounds are normal. There is no distension.      Palpations: Abdomen is soft. There is no mass.   Musculoskeletal:      Cervical back: Neck supple.   Lymphadenopathy:      Cervical: No cervical adenopathy.   Skin:     General: Skin is warm and " dry.   Neurological:      Mental Status: She is alert and oriented to person, place, and time.   Psychiatric:         Behavior: Behavior normal.        Result Review :                 Assessment and Plan    Diagnoses and all orders for this visit:    1. Sore throat (Primary)  -     COVID-19,[facility] ROUTINE, NP/OP SWAB IN TRANSPORT MEDIA OR ESWAB 72 HR TAT - Swab, Nasopharynx  -     omeprazole (PrilOSEC) 20 MG capsule; Take 1 capsule by mouth Daily.  Dispense: 30 capsule; Refill: 3    2. Anxiety  -     ALPRAZolam (Xanax) 0.5 MG tablet; Take 1 tablet by mouth 2 (Two) Times a Day As Needed for Anxiety.  Dispense: 60 tablet; Refill: 5      We will do Covid test today.  I will send over Prilosec for the time being.  I will send over Xanax 0.5 mg twice a day which was her original order.  Benny report was reviewed and appropriate.  Follow Up   No follow-ups on file.  Patient was given instructions and counseling regarding her condition or for health maintenance advice. Please see specific information pulled into the AVS if appropriate.     I spent a total of 20 minutes with the patient today including face-to-face encounter, reviewing data in the system, coordination of care with the nursing staff as well as consultants, documentation and entering orders.

## 2021-03-17 LAB — SARS-COV-2 RNA RESP QL NAA+PROBE: NOT DETECTED

## 2021-03-30 DIAGNOSIS — R47.02 DYSPHASIA: Primary | ICD-10-CM

## 2021-04-01 ENCOUNTER — TELEPHONE (OUTPATIENT)
Dept: GASTROENTEROLOGY | Facility: CLINIC | Age: 61
End: 2021-04-01

## 2021-04-01 ENCOUNTER — OFFICE VISIT (OUTPATIENT)
Dept: GASTROENTEROLOGY | Facility: CLINIC | Age: 61
End: 2021-04-01

## 2021-04-01 VITALS — BODY MASS INDEX: 21.7 KG/M2 | WEIGHT: 143.2 LBS | TEMPERATURE: 98 F | HEIGHT: 68 IN

## 2021-04-01 DIAGNOSIS — R13.19 ESOPHAGEAL DYSPHAGIA: ICD-10-CM

## 2021-04-01 DIAGNOSIS — K21.9 GASTROESOPHAGEAL REFLUX DISEASE, UNSPECIFIED WHETHER ESOPHAGITIS PRESENT: Primary | ICD-10-CM

## 2021-04-01 PROCEDURE — 99204 OFFICE O/P NEW MOD 45 MIN: CPT | Performed by: INTERNAL MEDICINE

## 2021-04-01 RX ORDER — SUCRALFATE 1 G/1
1 TABLET ORAL 4 TIMES DAILY
Qty: 120 TABLET | Refills: 3 | Status: SHIPPED | OUTPATIENT
Start: 2021-04-01 | End: 2021-07-15

## 2021-04-01 NOTE — PROGRESS NOTES
Chief Complaint   Patient presents with   • Difficulty Swallowing   • Abdominal Pain     Janis Rosa is a 60 y.o. female who presents with a history of substernal chest discomfort, esophageal dysphagia to solids and liquids  HPI     Patient 60-year-old female history of smoking with endometrial cancer and melanoma status post recent colon resection for diverticulitis.  Patient reports 4 weeks ago after significantly stressful time in her life developed increased raspiness of voice with solid and liquid dysphagia with substernal chest discomfort.  Discomfort mostly with swallowing but not related to exertion.  Patient with no fever chills no weight loss.  Patient does report a relative, uncle with esophageal cancer here for further recommendations.  Patient denies any hematemesis no bright red blood per rectum or melena noted.    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    s/p excisional biopsy, rt breast w/ mammogram needle localization   • Bipolar 1 disorder (CMS/HCC)    • Diverticulitis    • Endometrial cancer (CMS/HCC)    • Melanoma (CMS/HCC)    • Panic attack    • Transverse myelitis (CMS/HCC)     HISTORY OF YEARS AGO       Current Outpatient Medications:   •  ALPRAZolam (Xanax) 0.5 MG tablet, Take 1 tablet by mouth 2 (Two) Times a Day As Needed for Anxiety., Disp: 60 tablet, Rfl: 5  •  ascorbic acid (VITAMIN C) 1000 MG tablet, Take 1 tablet by mouth., Disp: , Rfl:   •  Cimetidine (TAGAMET PO), Take  by mouth Daily., Disp: , Rfl:   •  HYDROcodone-acetaminophen (NORCO) 5-325 MG per tablet, TAKE 2 TABLETS BY MOUTH EVERY FOUR HOURS AS NEEDED FOR MODERATE PAIN, Disp: 30 tablet, Rfl: 0  •  omeprazole (PrilOSEC) 20 MG capsule, Take 1 capsule by mouth Daily. (Patient taking differently: Take 20 mg by mouth 2 (two) times a day.), Disp: 30 capsule, Rfl: 3  •  venlafaxine XR (EFFEXOR-XR) 75 MG 24 hr capsule, Take 1 capsule by mouth., Disp: , Rfl:   •   sucralfate (Carafate) 1 g tablet, Take 1 tablet by mouth 4 (Four) Times a Day. Dissolve in 15 cc water, Disp: 120 tablet, Rfl: 3  Allergies   Allergen Reactions   • Percocet [Oxycodone-Acetaminophen] Nausea Only   • Morphine And Related Rash   • Sulfa Antibiotics Rash     Social History     Socioeconomic History   • Marital status:      Spouse name: Not on file   • Number of children: Not on file   • Years of education: Not on file   • Highest education level: Not on file   Tobacco Use   • Smoking status: Current Every Day Smoker     Packs/day: 0.25     Years: 30.00     Pack years: 7.50     Types: Cigarettes     Start date: 12/14/1987   • Smokeless tobacco: Never Used   • Tobacco comment: using a vape cig   Substance and Sexual Activity   • Alcohol use: Not Currently   • Drug use: No   • Sexual activity: Defer     Family History   Problem Relation Age of Onset   • COPD Mother    • Heart disease Mother    • Breast cancer Maternal Grandfather    • Esophageal cancer Maternal Grandfather    • Malig Hyperthermia Neg Hx      Review of Systems   Constitutional: Negative.    Respiratory: Negative.    Cardiovascular: Negative.    Gastrointestinal: Negative.    Musculoskeletal: Negative.    Skin: Negative.    Hematological: Negative.      Vitals:    04/01/21 1337   Temp: 98 °F (36.7 °C)     Physical Exam  Vitals and nursing note reviewed.   Constitutional:       Appearance: Normal appearance. She is well-developed and normal weight.   HENT:      Head: Normocephalic and atraumatic.   Eyes:      General: No scleral icterus.     Pupils: Pupils are equal, round, and reactive to light.   Cardiovascular:      Rate and Rhythm: Normal rate and regular rhythm.      Heart sounds: Normal heart sounds. No murmur heard.   No friction rub. No gallop.    Pulmonary:      Effort: Pulmonary effort is normal.      Breath sounds: Normal breath sounds. No wheezing or rales.   Abdominal:      General: Bowel sounds are normal. There is no  distension or abdominal bruit.      Palpations: Abdomen is soft. Abdomen is not rigid. There is no shifting dullness, fluid wave, mass or pulsatile mass.      Tenderness: There is no abdominal tenderness. There is no guarding.      Hernia: No hernia is present.   Musculoskeletal:         General: No swelling or tenderness. Normal range of motion.   Skin:     General: Skin is warm and dry.      Coloration: Skin is not jaundiced.      Findings: No rash.   Neurological:      General: No focal deficit present.      Mental Status: She is alert and oriented to person, place, and time.      Cranial Nerves: No cranial nerve deficit.   Psychiatric:         Behavior: Behavior normal.         Thought Content: Thought content normal.         Judgment: Judgment normal.       Diagnoses and all orders for this visit:    Gastroesophageal reflux disease, unspecified whether esophagitis present  -     Case Request; Standing  -     Implement Anesthesia orders day of procedure.; Standing  -     Obtain informed consent; Standing  -     Case Request    Esophageal dysphagia  -     Case Request; Standing  -     Implement Anesthesia orders day of procedure.; Standing  -     Obtain informed consent; Standing  -     Case Request    Other orders  -     Cimetidine (TAGAMET PO); Take  by mouth Daily.  -     sucralfate (Carafate) 1 g tablet; Take 1 tablet by mouth 4 (Four) Times a Day. Dissolve in 15 cc water    Patient 60-year-old female with history of bipolar disorder, endometrial cancer, melanoma, colon resection for diverticulitis and long-term smoker presenting with raspy voice mid esophageal dysphagia to solids and liquids and atypical chest pain.  Patient reports pain in the substernal area typically with food or swallowing.  At times patient feels like it makes even her short of breath on top of her baseline breathing issues.  Patient's voice does seem to be a little raspy but this all coming out after some severe stress with multiple  family members passing away.  Patient given omeprazole with no significant improvement yet, only has taken for 1 week.  Symptoms most consistent with GERD related findings but in view of her history of particularly smoking and voice changes and dysphagia would recommend proceeding with EGD and add Carafate to aid in healing pending endoscopy.

## 2021-04-02 RX ORDER — VENLAFAXINE HYDROCHLORIDE 150 MG/1
CAPSULE, EXTENDED RELEASE ORAL
Qty: 30 CAPSULE | Refills: 2 | Status: SHIPPED | OUTPATIENT
Start: 2021-04-02 | End: 2021-06-30 | Stop reason: SDUPTHER

## 2021-04-07 ENCOUNTER — TRANSCRIBE ORDERS (OUTPATIENT)
Dept: LAB | Facility: HOSPITAL | Age: 61
End: 2021-04-07

## 2021-04-07 DIAGNOSIS — Z01.818 OTHER SPECIFIED PRE-OPERATIVE EXAMINATION: Primary | ICD-10-CM

## 2021-04-09 DIAGNOSIS — K21.9 GASTROESOPHAGEAL REFLUX DISEASE WITHOUT ESOPHAGITIS: Primary | ICD-10-CM

## 2021-04-09 RX ORDER — OMEPRAZOLE 20 MG/1
20 TABLET, DELAYED RELEASE ORAL DAILY
Qty: 60 TABLET | Refills: 3 | Status: SHIPPED | OUTPATIENT
Start: 2021-04-09 | End: 2021-07-15

## 2021-04-16 ENCOUNTER — LAB (OUTPATIENT)
Dept: LAB | Facility: HOSPITAL | Age: 61
End: 2021-04-16

## 2021-04-16 DIAGNOSIS — Z01.818 OTHER SPECIFIED PRE-OPERATIVE EXAMINATION: ICD-10-CM

## 2021-04-16 PROCEDURE — U0004 COV-19 TEST NON-CDC HGH THRU: HCPCS

## 2021-04-16 PROCEDURE — C9803 HOPD COVID-19 SPEC COLLECT: HCPCS

## 2021-04-17 LAB — SARS-COV-2 ORF1AB RESP QL NAA+PROBE: NOT DETECTED

## 2021-04-19 ENCOUNTER — ANESTHESIA EVENT (OUTPATIENT)
Dept: GASTROENTEROLOGY | Facility: HOSPITAL | Age: 61
End: 2021-04-19

## 2021-04-19 ENCOUNTER — HOSPITAL ENCOUNTER (OUTPATIENT)
Facility: HOSPITAL | Age: 61
Setting detail: HOSPITAL OUTPATIENT SURGERY
Discharge: HOME | End: 2021-04-19
Attending: INTERNAL MEDICINE | Admitting: INTERNAL MEDICINE

## 2021-04-19 ENCOUNTER — ANESTHESIA (OUTPATIENT)
Dept: GASTROENTEROLOGY | Facility: HOSPITAL | Age: 61
End: 2021-04-19

## 2021-04-19 VITALS
HEIGHT: 68 IN | SYSTOLIC BLOOD PRESSURE: 107 MMHG | HEART RATE: 71 BPM | DIASTOLIC BLOOD PRESSURE: 65 MMHG | BODY MASS INDEX: 20.87 KG/M2 | WEIGHT: 137.7 LBS | TEMPERATURE: 98 F | OXYGEN SATURATION: 97 % | RESPIRATION RATE: 16 BRPM

## 2021-04-19 DIAGNOSIS — K21.9 GASTROESOPHAGEAL REFLUX DISEASE, UNSPECIFIED WHETHER ESOPHAGITIS PRESENT: ICD-10-CM

## 2021-04-19 DIAGNOSIS — R13.19 ESOPHAGEAL DYSPHAGIA: ICD-10-CM

## 2021-04-19 PROCEDURE — 88305 TISSUE EXAM BY PATHOLOGIST: CPT | Performed by: INTERNAL MEDICINE

## 2021-04-19 PROCEDURE — 43239 EGD BIOPSY SINGLE/MULTIPLE: CPT | Performed by: INTERNAL MEDICINE

## 2021-04-19 PROCEDURE — 25010000002 PROPOFOL 10 MG/ML EMULSION: Performed by: NURSE ANESTHETIST, CERTIFIED REGISTERED

## 2021-04-19 RX ORDER — PROPOFOL 10 MG/ML
VIAL (ML) INTRAVENOUS CONTINUOUS PRN
Status: DISCONTINUED | OUTPATIENT
Start: 2021-04-19 | End: 2021-04-19 | Stop reason: SURG

## 2021-04-19 RX ORDER — PROPOFOL 10 MG/ML
VIAL (ML) INTRAVENOUS AS NEEDED
Status: DISCONTINUED | OUTPATIENT
Start: 2021-04-19 | End: 2021-04-19 | Stop reason: SURG

## 2021-04-19 RX ORDER — SODIUM CHLORIDE, SODIUM LACTATE, POTASSIUM CHLORIDE, CALCIUM CHLORIDE 600; 310; 30; 20 MG/100ML; MG/100ML; MG/100ML; MG/100ML
30 INJECTION, SOLUTION INTRAVENOUS CONTINUOUS PRN
Status: DISCONTINUED | OUTPATIENT
Start: 2021-04-19 | End: 2021-04-19 | Stop reason: HOSPADM

## 2021-04-19 RX ORDER — LIDOCAINE HYDROCHLORIDE 20 MG/ML
INJECTION, SOLUTION INFILTRATION; PERINEURAL AS NEEDED
Status: DISCONTINUED | OUTPATIENT
Start: 2021-04-19 | End: 2021-04-19 | Stop reason: SURG

## 2021-04-19 RX ADMIN — PROPOFOL 50 MG: 10 INJECTION, EMULSION INTRAVENOUS at 11:49

## 2021-04-19 RX ADMIN — SODIUM CHLORIDE, POTASSIUM CHLORIDE, SODIUM LACTATE AND CALCIUM CHLORIDE 30 ML/HR: 600; 310; 30; 20 INJECTION, SOLUTION INTRAVENOUS at 11:10

## 2021-04-19 RX ADMIN — LIDOCAINE HYDROCHLORIDE 60 MG: 20 INJECTION, SOLUTION INFILTRATION; PERINEURAL at 11:49

## 2021-04-19 RX ADMIN — PROPOFOL 200 MCG/KG/MIN: 10 INJECTION, EMULSION INTRAVENOUS at 11:49

## 2021-04-19 NOTE — BRIEF OP NOTE
ESOPHAGOGASTRODUODENOSCOPY  Progress Note    Janis Rosa  4/19/2021    Pre-op Diagnosis:   Gastroesophageal reflux disease, unspecified whether esophagitis present [K21.9]  Esophageal dysphagia [R13.10]       Post-Op Diagnosis Codes:     * Gastroesophageal reflux disease, unspecified whether esophagitis present [K21.9]     * Esophageal dysphagia [R13.10]     * Irregular Z line of esophagus [K22.9]    Procedure/CPT® Codes:        Procedure(s):  ESOPHAGOGASTRODUODENOSCOPY WITH COLD BIOPSIES    Surgeon(s):  Cali Hall MD    Anesthesia: Monitored Anesthesia Care    Staff:   Endo Technician: Celine Lopez PCT  Endo Nurse: Viktoria Meadows RN         Estimated Blood Loss: minimal    Urine Voided: * No values recorded between 4/19/2021 11:45 AM and 4/19/2021 11:55 AM *    Specimens:                Specimens     ID Source Type Tests Collected By Collected At Frozen?    A Esophagus Tissue · TISSUE PATHOLOGY EXAM   Cali Hall MD 4/19/21 1154     Description: GE JUNCTION BIOPSIES    This specimen was not marked as sent.    B Esophagus, Mid Tissue · TISSUE PATHOLOGY EXAM   Cali Hall MD 4/19/21 1154     Description: MID ESOPHAGEAL BIOPSIES    This specimen was not marked as sent.                Drains: * No LDAs found *    Findings: EGD with normal duodenum and stomach.  Irregular Z-line consistent with esophagitis biopsies of the GE junction were obtained as well as the midesophagus to rule out eosinophilic esophagitis.  Remainder of the esophageal mucosa was normal.    Complications: None          Cali Hall MD     Date: 4/19/2021  Time: 11:56 EDT

## 2021-04-19 NOTE — ANESTHESIA PREPROCEDURE EVALUATION
Anesthesia Evaluation     Patient summary reviewed and Nursing notes reviewed   no history of anesthetic complications:  NPO Solid Status: > 8 hours  NPO Liquid Status: < 2 hours           Airway   Mallampati: II  Dental - normal exam     Pulmonary - normal exam   (+) a smoker Current Smoked day of surgery,   Cardiovascular - negative cardio ROS and normal exam        Neuro/Psych  (+) psychiatric history Bipolar,     GI/Hepatic/Renal/Endo    (+)  GERD, GI bleeding ,     Musculoskeletal     Abdominal    Substance History      OB/GYN          Other   arthritis,    history of cancer remission                      Anesthesia Plan    ASA 2     MAC     intravenous induction     Anesthetic plan, all risks, benefits, and alternatives have been provided, discussed and informed consent has been obtained with: patient.

## 2021-04-19 NOTE — DISCHARGE INSTRUCTIONS
For the next 24 hours patient needs to be with a responsible adult.    For 24 hours DO NOT drive, operate machinery, appliances, drink alcohol, make important decisions or sign legal documents.    Start with a light or bland diet if you are feeling sick to your stomach otherwise advance to regular diet as tolerated.    Follow recommendations on procedure report if provided by your doctor.    Call Dr Hall for problems [phone]    Problems may include but not limited to: large amounts of bleeding, trouble breathing, repeated vomiting, severe unrelieved pain, fever or chills.

## 2021-04-19 NOTE — ANESTHESIA POSTPROCEDURE EVALUATION
"Patient: Janis Rosa    Procedure Summary     Date: 04/19/21 Room / Location:  [facility]    Anesthesia Start: 1144 Anesthesia Stop: 1202    Procedure: ESOPHAGOGASTRODUODENOSCOPY WITH COLD BIOPSIES (N/A Esophagus) Diagnosis:       Gastroesophageal reflux disease, unspecified whether esophagitis present      Esophageal dysphagia      Irregular Z line of esophagus      (Gastroesophageal reflux disease, unspecified whether esophagitis present [K21.9])      (Esophageal dysphagia [R13.10])    Surgeons: Cali Hall MD Provider: Yovani Farias MD    Anesthesia Type: MAC ASA Status: 2          Anesthesia Type: MAC    Vitals  Vitals Value Taken Time   /65 04/19/21 1211   Temp     Pulse 71 04/19/21 1211   Resp 16 04/19/21 1211   SpO2 97 % 04/19/21 1211           Post Anesthesia Care and Evaluation    Patient location during evaluation: bedside  Patient participation: complete - patient participated  Level of consciousness: awake and alert  Pain management: adequate  Airway patency: patent  Anesthetic complications: No anesthetic complications    Cardiovascular status: acceptable  Respiratory status: acceptable  Hydration status: acceptable    Comments: /65   Pulse 71   Temp 36.7 °C (98 °F) (Oral)   Resp 16   Ht 172.7 cm (68\")   Wt 62.5 kg (137 lb 11.2 oz)   SpO2 97%   BMI 20.94 kg/m²       "

## 2021-04-20 LAB
CYTO UR: NORMAL
LAB AP CASE REPORT: NORMAL
PATH REPORT.FINAL DX SPEC: NORMAL
PATH REPORT.GROSS SPEC: NORMAL

## 2021-05-04 ENCOUNTER — TELEPHONE (OUTPATIENT)
Dept: GASTROENTEROLOGY | Facility: CLINIC | Age: 61
End: 2021-05-04

## 2021-05-11 ENCOUNTER — TELEPHONE (OUTPATIENT)
Dept: FAMILY MEDICINE CLINIC | Facility: CLINIC | Age: 61
End: 2021-05-11

## 2021-05-11 RX ORDER — VARENICLINE TARTRATE 0.5 MG/1
0.5 TABLET, FILM COATED ORAL 2 TIMES DAILY
Qty: 60 TABLET | Refills: 2 | Status: SHIPPED | OUTPATIENT
Start: 2021-05-11 | End: 2021-07-15

## 2021-05-11 NOTE — TELEPHONE ENCOUNTER
PATIENT HAD SURGERY  THIS MORNING AND TRIED THE NICODERM PATCHES AND IT IMMEDIATELY STARTED ITCHING AND BREAKING OUT IN A RASH.    PLEASE ADVISE  [phone]

## 2021-06-30 RX ORDER — VENLAFAXINE HYDROCHLORIDE 150 MG/1
CAPSULE, EXTENDED RELEASE ORAL
Qty: 30 CAPSULE | Refills: 2 | Status: SHIPPED | OUTPATIENT
Start: 2021-06-30 | End: 2021-10-27

## 2021-07-15 ENCOUNTER — OFFICE VISIT (OUTPATIENT)
Dept: FAMILY MEDICINE CLINIC | Facility: CLINIC | Age: 61
End: 2021-07-15

## 2021-07-15 VITALS
HEART RATE: 82 BPM | BODY MASS INDEX: 20.92 KG/M2 | RESPIRATION RATE: 20 BRPM | DIASTOLIC BLOOD PRESSURE: 74 MMHG | SYSTOLIC BLOOD PRESSURE: 122 MMHG | HEIGHT: 68 IN | TEMPERATURE: 97.1 F | WEIGHT: 138 LBS

## 2021-07-15 DIAGNOSIS — H92.03 EAR PAIN, BILATERAL: Primary | ICD-10-CM

## 2021-07-15 DIAGNOSIS — H66.004 RECURRENT ACUTE SUPPURATIVE OTITIS MEDIA OF RIGHT EAR WITHOUT SPONTANEOUS RUPTURE OF TYMPANIC MEMBRANE: ICD-10-CM

## 2021-07-15 DIAGNOSIS — F41.9 ANXIETY: ICD-10-CM

## 2021-07-15 PROBLEM — C50.919 BREAST CANCER: Status: ACTIVE | Noted: 2021-07-15

## 2021-07-15 PROBLEM — G04.91 MYELITIS: Status: ACTIVE | Noted: 2021-07-15

## 2021-07-15 PROBLEM — C54.1 ENDOMETRIAL CANCER (HCC): Status: ACTIVE | Noted: 2021-07-15

## 2021-07-15 PROCEDURE — 99213 OFFICE O/P EST LOW 20 MIN: CPT | Performed by: NURSE PRACTITIONER

## 2021-07-15 RX ORDER — CIPROFLOXACIN AND DEXAMETHASONE 3; 1 MG/ML; MG/ML
SUSPENSION/ DROPS AURICULAR (OTIC)
COMMUNITY
Start: 2021-06-17 | End: 2022-05-05

## 2021-07-15 RX ORDER — CEFDINIR 300 MG/1
CAPSULE ORAL
COMMUNITY
Start: 2021-06-17 | End: 2021-07-15

## 2021-07-15 RX ORDER — AZITHROMYCIN 250 MG/1
TABLET, FILM COATED ORAL
Qty: 6 TABLET | Refills: 0 | Status: SHIPPED | OUTPATIENT
Start: 2021-07-15 | End: 2021-09-08 | Stop reason: ALTCHOICE

## 2021-07-15 RX ORDER — OMEPRAZOLE 20 MG/1
20 CAPSULE, DELAYED RELEASE ORAL 2 TIMES DAILY
COMMUNITY
Start: 2021-05-19 | End: 2022-01-31

## 2021-07-15 RX ORDER — ONDANSETRON 4 MG/1
TABLET, ORALLY DISINTEGRATING ORAL
COMMUNITY
Start: 2021-05-11 | End: 2021-07-15

## 2021-07-15 RX ORDER — ALPRAZOLAM 0.5 MG/1
0.5 TABLET ORAL 3 TIMES DAILY PRN
Qty: 90 TABLET | Refills: 5 | Status: SHIPPED | OUTPATIENT
Start: 2021-07-15 | End: 2022-01-24

## 2021-07-15 NOTE — PROGRESS NOTES
"Chief Complaint  Earache and Anxiety    Subjective          Janis Rosa presents to [facility]  History of Present Illness  This is a 60-year-old female patient here today with complaints of right ear pain.  Patient reports right ear pain that started over a week ago.  She is afebrile.  She denies cough sore throat or shortness of breath.  She is currently on Xanax twice a day for anxiety and has been for over 25 years.  She is now moved her elderly dad and with her from a nursing home in [place] who is now on palliative care with [facility].  She reports increased anxiety at times due to stress of managing his care at home.  She does have help but reports being overwhelmed at times.  She admits to taking an extra Xanax if needed.      Objective   Vital Signs:   /74   Pulse 82   Temp 97.1 °F (36.2 °C)   Resp 20   Ht 172 cm (67.72\")   Wt 62.6 kg (138 lb)   BMI 21.16 kg/m²     Physical Exam  Vitals and nursing note reviewed.   HENT:      Head: Normocephalic and atraumatic.      Right Ear: A PE tube is present. Tympanic membrane is erythematous.      Left Ear: A PE tube is present.      Nose: Nose normal.   Eyes:      Pupils: Pupils are equal, round, and reactive to light.   Cardiovascular:      Rate and Rhythm: Normal rate and regular rhythm.      Pulses: Normal pulses.      Heart sounds: Normal heart sounds.   Pulmonary:      Effort: Pulmonary effort is normal. No respiratory distress.      Breath sounds: Normal breath sounds. No wheezing or rales.   Abdominal:      General: Bowel sounds are normal. There is no distension.      Tenderness: There is no abdominal tenderness.   Musculoskeletal:         General: No swelling.      Cervical back: Neck supple.      Right lower leg: No edema.      Left lower leg: No edema.   Skin:     General: Skin is warm and dry.   Neurological:      Mental Status: She is alert and oriented to person, place, and time.   Psychiatric:    "      Mood and Affect: Mood normal.        Result Review :                 Assessment and Plan    Diagnoses and all orders for this visit:    1. Ear pain, bilateral (Primary)    2. Anxiety  -     ALPRAZolam (Xanax) 0.5 MG tablet; Take 1 tablet by mouth 3 (Three) Times a Day As Needed for Anxiety.  Dispense: 90 tablet; Refill: 5    3. Recurrent acute suppurative otitis media of right ear without spontaneous rupture of tympanic membrane  -     azithromycin (Zithromax Z-Eh) 250 MG tablet; Take 2 tablets by mouth on day 1, then 1 tablet daily on days 2-5  Dispense: 6 tablet; Refill: 0    I will give her Z-Eh for her ear infection today.  We have discussed her anxiety and due to recent changes with her dad and his health I have agreed to increase her Xanax for the time being.  Benny was reviewed and urine drug screen with contract is on file.    Follow Up   No follow-ups on file.  Patient was given instructions and counseling regarding her condition or for health maintenance advice. Please see specific information pulled into the AVS if appropriate.

## 2021-09-08 ENCOUNTER — OFFICE VISIT (OUTPATIENT)
Dept: FAMILY MEDICINE CLINIC | Facility: CLINIC | Age: 61
End: 2021-09-08

## 2021-09-08 VITALS
WEIGHT: 142 LBS | BODY MASS INDEX: 21.52 KG/M2 | HEIGHT: 68 IN | SYSTOLIC BLOOD PRESSURE: 118 MMHG | HEART RATE: 92 BPM | RESPIRATION RATE: 22 BRPM | TEMPERATURE: 97.6 F | DIASTOLIC BLOOD PRESSURE: 72 MMHG

## 2021-09-08 DIAGNOSIS — Z13.0 SCREENING FOR DEFICIENCY ANEMIA: ICD-10-CM

## 2021-09-08 DIAGNOSIS — R21 RASH: Primary | ICD-10-CM

## 2021-09-08 DIAGNOSIS — B02.9 HERPES ZOSTER WITHOUT COMPLICATION: ICD-10-CM

## 2021-09-08 DIAGNOSIS — Z11.59 NEED FOR HEPATITIS C SCREENING TEST: ICD-10-CM

## 2021-09-08 DIAGNOSIS — Z13.220 SCREENING FOR HYPERLIPIDEMIA: ICD-10-CM

## 2021-09-08 PROCEDURE — 99213 OFFICE O/P EST LOW 20 MIN: CPT | Performed by: NURSE PRACTITIONER

## 2021-09-08 RX ORDER — VALACYCLOVIR HYDROCHLORIDE 500 MG/1
500 TABLET, FILM COATED ORAL 3 TIMES DAILY
Qty: 21 TABLET | Refills: 0 | Status: SHIPPED | OUTPATIENT
Start: 2021-09-08 | End: 2022-02-28

## 2021-09-08 RX ORDER — ONDANSETRON 4 MG/1
TABLET, ORALLY DISINTEGRATING ORAL
COMMUNITY
Start: 2021-07-21 | End: 2021-09-08 | Stop reason: ALTCHOICE

## 2021-09-08 RX ORDER — METHYLPREDNISOLONE 4 MG/1
TABLET ORAL
COMMUNITY
Start: 2021-09-04 | End: 2021-11-04

## 2021-10-27 RX ORDER — VENLAFAXINE HYDROCHLORIDE 150 MG/1
CAPSULE, EXTENDED RELEASE ORAL
Qty: 30 CAPSULE | Refills: 2 | Status: SHIPPED | OUTPATIENT
Start: 2021-10-27 | End: 2022-03-03 | Stop reason: SDUPTHER

## 2021-11-04 ENCOUNTER — OFFICE VISIT (OUTPATIENT)
Dept: FAMILY MEDICINE CLINIC | Facility: CLINIC | Age: 61
End: 2021-11-04

## 2021-11-04 VITALS
OXYGEN SATURATION: 99 % | BODY MASS INDEX: 21.67 KG/M2 | SYSTOLIC BLOOD PRESSURE: 100 MMHG | TEMPERATURE: 97.9 F | HEIGHT: 68 IN | WEIGHT: 143 LBS | HEART RATE: 104 BPM | DIASTOLIC BLOOD PRESSURE: 64 MMHG

## 2021-11-04 DIAGNOSIS — Z13.0 SCREENING FOR DEFICIENCY ANEMIA: ICD-10-CM

## 2021-11-04 DIAGNOSIS — Z12.31 ENCOUNTER FOR SCREENING MAMMOGRAM FOR MALIGNANT NEOPLASM OF BREAST: ICD-10-CM

## 2021-11-04 DIAGNOSIS — Z13.220 SCREENING FOR HYPERLIPIDEMIA: ICD-10-CM

## 2021-11-04 DIAGNOSIS — H92.02 EAR PAIN, LEFT: ICD-10-CM

## 2021-11-04 DIAGNOSIS — R05.9 COUGH: Primary | ICD-10-CM

## 2021-11-04 PROCEDURE — 99213 OFFICE O/P EST LOW 20 MIN: CPT | Performed by: NURSE PRACTITIONER

## 2021-11-04 NOTE — PROGRESS NOTES
"Chief Complaint  Earache and Cough (on and off )    Subjective          Janis Rsoa presents to [facility]  History of Present Illness   This is a 61 yr old female patient being seen today with left ear pain and drainage.  Patient reports dealing with left ear drainage on and off.  She has seen ENT and had an infection in her left ear where she received antibiotics and antibiotic drops.  The medication helped but has now returned.  She has started using antibiotic drops again.  She also developed mild cough.  She is afebrile.  She denies any shortness of breath chest pain or throat pain .  She does have increased drainage in her throat.    Objective   Vital Signs:   /64   Pulse 104   Temp 97.9 °F (36.6 °C)   Ht 172 cm (67.72\")   Wt 64.9 kg (143 lb)   SpO2 99%   BMI 21.93 kg/m²     Physical Exam  Vitals and nursing note reviewed.   Constitutional:       Appearance: Normal appearance.   HENT:      Head: Normocephalic.      Ears:      Comments: Left ear tube appears to be dislodged with purulent drainage noted.  Neurological:      Mental Status: She is alert.        Result Review :                 Assessment and Plan    Diagnoses and all orders for this visit:    1. Cough (Primary)  -     COVID-19,[facility] ROUTINE, NP/OP SWAB IN TRANSPORT MEDIA OR ESWAB 72 HR TAT - Swab, Nasopharynx    2. Ear pain, left  -     COVID-19,[facility] ROUTINE, NP/OP SWAB IN TRANSPORT MEDIA OR ESWAB 72 HR TAT - Swab, Nasopharynx    3. Screening for deficiency anemia  -     CBC & Differential    4. Screening for hyperlipidemia  -     Comprehensive Metabolic Panel  -     Lipid Panel    5. Encounter for screening mammogram for malignant neoplasm of breast    Other orders  -     SARS-CoV-2, NICK 2 DAY TAT - ,    Her left tube appears to be out more than normal with increased drainage.  My nurse has contacted her ENT doctor and they will see her later today for evaluation.  I did obtain a Covid swab today.  " She is fasting so we will obtain blood work on her since we have not done that in other visits.       Follow Up   No follow-ups on file.  Patient was given instructions and counseling regarding her condition or for health maintenance advice. Please see specific information pulled into the AVS if appropriate.

## 2021-11-05 LAB
ALBUMIN SERPL-MCNC: 4.7 G/DL (ref 3.8–4.9)
ALBUMIN/GLOB SERPL: 1.7 {RATIO} (ref 1.2–2.2)
ALP SERPL-CCNC: 75 IU/L (ref 44–121)
ALT SERPL-CCNC: 13 IU/L (ref 0–32)
AST SERPL-CCNC: 31 IU/L (ref 0–40)
BASOPHILS # BLD AUTO: 0 X10E3/UL (ref 0–0.2)
BASOPHILS NFR BLD AUTO: 1 %
BILIRUB SERPL-MCNC: 0.3 MG/DL (ref 0–1.2)
BUN SERPL-MCNC: 14 MG/DL (ref 6–24)
BUN/CREAT SERPL: 18 (ref 9–23)
CALCIUM SERPL-MCNC: 9.8 MG/DL (ref 8.7–10.2)
CHLORIDE SERPL-SCNC: 103 MMOL/L (ref 96–106)
CHOLEST SERPL-MCNC: 233 MG/DL (ref 100–199)
CO2 SERPL-SCNC: 25 MMOL/L (ref 20–29)
CREAT SERPL-MCNC: 0.78 MG/DL (ref 0.57–1)
EOSINOPHIL # BLD AUTO: 0.2 X10E3/UL (ref 0–0.4)
EOSINOPHIL NFR BLD AUTO: 2 %
ERYTHROCYTE [DISTWIDTH] IN BLOOD BY AUTOMATED COUNT: 12.6 % (ref 11.7–15.4)
GLOBULIN SER CALC-MCNC: 2.7 G/DL (ref 1.5–4.5)
GLUCOSE SERPL-MCNC: 99 MG/DL (ref 65–99)
HCT VFR BLD AUTO: 41.4 % (ref 34–46.6)
HCV AB S/CO SERPL IA: <0.1 S/CO RATIO (ref 0–0.9)
HDLC SERPL-MCNC: 79 MG/DL
HGB BLD-MCNC: 13.3 G/DL (ref 11.1–15.9)
IMM GRANULOCYTES # BLD AUTO: 0 X10E3/UL (ref 0–0.1)
IMM GRANULOCYTES NFR BLD AUTO: 0 %
LABCORP SARS-COV-2, NAA 2 DAY TAT: NORMAL
LDLC SERPL CALC-MCNC: 134 MG/DL (ref 0–99)
LYMPHOCYTES # BLD AUTO: 2.9 X10E3/UL (ref 0.7–3.1)
LYMPHOCYTES NFR BLD AUTO: 43 %
Lab: NORMAL
MCH RBC QN AUTO: 29.2 PG (ref 26.6–33)
MCHC RBC AUTO-ENTMCNC: 32.1 G/DL (ref 31.5–35.7)
MCV RBC AUTO: 91 FL (ref 79–97)
MONOCYTES # BLD AUTO: 0.8 X10E3/UL (ref 0.1–0.9)
MONOCYTES NFR BLD AUTO: 11 %
NEUTROPHILS # BLD AUTO: 2.9 X10E3/UL (ref 1.4–7)
NEUTROPHILS NFR BLD AUTO: 43 %
PLATELET # BLD AUTO: 434 X10E3/UL (ref 150–450)
POTASSIUM SERPL-SCNC: 4.8 MMOL/L (ref 3.5–5.2)
PROT SERPL-MCNC: 7.4 G/DL (ref 6–8.5)
RBC # BLD AUTO: 4.56 X10E6/UL (ref 3.77–5.28)
SARS-COV-2 RNA RESP QL NAA+PROBE: NOT DETECTED
SODIUM SERPL-SCNC: 141 MMOL/L (ref 134–144)
TRIGL SERPL-MCNC: 114 MG/DL (ref 0–149)
VLDLC SERPL CALC-MCNC: 20 MG/DL (ref 5–40)
WBC # BLD AUTO: 6.8 X10E3/UL (ref 3.4–10.8)

## 2021-11-11 ENCOUNTER — TRANSCRIBE ORDERS (OUTPATIENT)
Dept: ADMINISTRATIVE | Facility: HOSPITAL | Age: 61
End: 2021-11-11

## 2021-11-11 DIAGNOSIS — Z12.31 BREAST CANCER SCREENING BY MAMMOGRAM: Primary | ICD-10-CM

## 2021-12-29 ENCOUNTER — APPOINTMENT (OUTPATIENT)
Dept: MAMMOGRAPHY | Facility: HOSPITAL | Age: 61
End: 2021-12-29

## 2022-01-19 ENCOUNTER — LAB (OUTPATIENT)
Dept: FAMILY MEDICINE CLINIC | Facility: CLINIC | Age: 62
End: 2022-01-19

## 2022-01-23 DIAGNOSIS — F41.9 ANXIETY: ICD-10-CM

## 2022-01-24 RX ORDER — ALPRAZOLAM 0.5 MG/1
TABLET ORAL
Qty: 30 TABLET | Refills: 0 | Status: SHIPPED | OUTPATIENT
Start: 2022-01-24 | End: 2022-02-15

## 2022-01-24 NOTE — TELEPHONE ENCOUNTER
Patient due on 2/1/22 to renew UDS and contract.       Last lab 11/4/21    Cholesterol elevated. Recommend following low fat diet and increased  exercise. Recheck in 6 months      Last ov 7/15/21    Last filled 7/15/21 with 5 refills,

## 2022-01-31 DIAGNOSIS — K21.9 GASTRO-ESOPHAGEAL REFLUX DISEASE WITHOUT ESOPHAGITIS: ICD-10-CM

## 2022-01-31 RX ORDER — OMEPRAZOLE 20 MG/1
CAPSULE, DELAYED RELEASE ORAL
Qty: 60 CAPSULE | Refills: 3 | Status: SHIPPED | OUTPATIENT
Start: 2022-01-31 | End: 2022-08-22

## 2022-01-31 NOTE — TELEPHONE ENCOUNTER
Last ov 11/4/21  Her left tube appears to be out more than normal with increased drainage.  My nurse has contacted her ENT doctor and they will see her later today for evaluation.  I did obtain a Covid swab today.  She is fasting so we will obtain blood work on her since we have not done that in other visits      Last lab 11/4/21  Cholesterol elevated. Recommend following low fat diet and increased  exercise. Recheck in 6 months

## 2022-02-09 ENCOUNTER — HOSPITAL ENCOUNTER (OUTPATIENT)
Dept: MAMMOGRAPHY | Facility: HOSPITAL | Age: 62
Discharge: HOME | End: 2022-02-09
Admitting: NURSE PRACTITIONER

## 2022-02-09 DIAGNOSIS — Z12.31 BREAST CANCER SCREENING BY MAMMOGRAM: ICD-10-CM

## 2022-02-09 PROCEDURE — 77063 BREAST TOMOSYNTHESIS BI: CPT

## 2022-02-09 PROCEDURE — 77067 SCR MAMMO BI INCL CAD: CPT

## 2022-02-15 DIAGNOSIS — F41.9 ANXIETY: ICD-10-CM

## 2022-02-15 NOTE — TELEPHONE ENCOUNTER
Last ov 2/1/21    History of Present Illness   This is a 60-year-old female patient being seen today for anxiety, and dysuria.  Patient is currently on Xanax as needed for anxiety and has been for several years.  She is doing well with current regimen and is here for refill.  She also reports dysuria.  She reports her symptoms started several days ago.  She denies any fever chills       will obtain urine drug screen today.  Patient has signed contract and I will continue to fill her Xanax as ordered.  I will obtain urine culture today.  She will return to see me in 6 months or sooner if needed    Last UDS and contract 2/1/21      Last lab   11/4/21  Cholesterol elevated. Recommend following low fat diet and increased  exercise. Recheck in 6 months    Patient is due for contract and UDS now

## 2022-02-16 RX ORDER — ALPRAZOLAM 0.5 MG/1
TABLET ORAL
Qty: 30 TABLET | Refills: 0 | Status: SHIPPED | OUTPATIENT
Start: 2022-02-16 | End: 2022-03-03 | Stop reason: SDUPTHER

## 2022-02-23 ENCOUNTER — TELEPHONE (OUTPATIENT)
Dept: FAMILY MEDICINE CLINIC | Facility: CLINIC | Age: 62
End: 2022-02-23

## 2022-02-23 NOTE — TELEPHONE ENCOUNTER
There are no results yet from [facility] in the patient chart. The test shows results still pending. We will reach out to the imaging and find out where they are.

## 2022-02-23 NOTE — TELEPHONE ENCOUNTER
Caller: Janis Rosa    Relationship: Self    Best call back number:     Caller requesting test results:     What test was performed: MAMMOGRAM    When was the test performed: 02/09/22    Where was the test performed: [facility] IN [place]    Additional notes: PATIENT IS CALLING IN STATING THAT SHE NEEDS HER MAMMOGRAM RESULTS.  SHE SAID THAT THIS WAS DONE ON 02/09/22 AND THAT [facility] STILL DID NOT HAVE HER RESULTS FROM THIS

## 2022-02-23 NOTE — TELEPHONE ENCOUNTER
Can you please call [facility] department and ask them why her mammo screen is still pending - it was done on 2/9/20222    Keep me posted, thank you     Jesenia

## 2022-02-28 ENCOUNTER — OFFICE VISIT (OUTPATIENT)
Dept: FAMILY MEDICINE CLINIC | Facility: CLINIC | Age: 62
End: 2022-02-28

## 2022-02-28 VITALS
OXYGEN SATURATION: 96 % | HEIGHT: 68 IN | BODY MASS INDEX: 23.28 KG/M2 | TEMPERATURE: 98.1 F | HEART RATE: 84 BPM | SYSTOLIC BLOOD PRESSURE: 122 MMHG | DIASTOLIC BLOOD PRESSURE: 80 MMHG | WEIGHT: 153.6 LBS

## 2022-02-28 DIAGNOSIS — F32.A DEPRESSION, UNSPECIFIED DEPRESSION TYPE: ICD-10-CM

## 2022-02-28 DIAGNOSIS — F41.9 ANXIETY: Primary | ICD-10-CM

## 2022-02-28 DIAGNOSIS — Z51.81 THERAPEUTIC DRUG MONITORING: ICD-10-CM

## 2022-02-28 DIAGNOSIS — K21.9 GASTROESOPHAGEAL REFLUX DISEASE WITHOUT ESOPHAGITIS: ICD-10-CM

## 2022-02-28 PROCEDURE — 99214 OFFICE O/P EST MOD 30 MIN: CPT | Performed by: NURSE PRACTITIONER

## 2022-02-28 PROCEDURE — 99396 PREV VISIT EST AGE 40-64: CPT | Performed by: NURSE PRACTITIONER

## 2022-02-28 NOTE — PROGRESS NOTES
"Chief Complaint  Anxiety, Depression, and Heartburn    Subjective          Janis Rosa presents to [facility]  History of Present Illness  This is a 61-year-old female patient being seen today for anxiety/depression, GERD.  Patient is currently on effexor and Xanax for anxiety for depression and anxiety.  Patient reports multiple deaths this year and her family 1 being her father.  She is learning to cope but reports her anxiety is been fairly high.  At 1 point we did make a referral to [facility] in [place] but was never given an appointment due to loss and paperwork.  She denies any suicidal thoughts today.  Her reflux is controlled with Prilosec and is avoiding trigger foods.    Objective   Vital Signs:   /80   Pulse 84   Temp 98.1 °F (36.7 °C)   Ht 172 cm (67.72\")   Wt 69.7 kg (153 lb 9.6 oz)   SpO2 96%   BMI 23.55 kg/m²     Physical Exam  Constitutional:       Appearance: Normal appearance.   HENT:      Head: Normocephalic.      Right Ear: Tympanic membrane normal.      Left Ear: Tympanic membrane normal.      Nose: Nose normal.      Mouth/Throat:      Mouth: Mucous membranes are moist.   Eyes:      Pupils: Pupils are equal, round, and reactive to light.   Cardiovascular:      Rate and Rhythm: Normal rate and regular rhythm.      Pulses: Normal pulses.      Heart sounds: Normal heart sounds.   Pulmonary:      Effort: Pulmonary effort is normal.      Breath sounds: Normal breath sounds.   Abdominal:      General: Bowel sounds are normal. There is no distension.      Palpations: There is no mass.   Musculoskeletal:         General: No swelling or tenderness.      Cervical back: Normal range of motion and neck supple.      Right lower leg: No edema.      Left lower leg: No edema.   Feet:      Comments:      Skin:     General: Skin is warm and dry.   Neurological:      Mental Status: She is alert and oriented to person, place, and time.   Psychiatric:         Mood " and Affect: Mood normal.         Behavior: Behavior normal.        Result Review :                 Assessment and Plan    Diagnoses and all orders for this visit:    1. Anxiety (Primary)  -     Ambulatory Referral to Psychiatry    2. Gastroesophageal reflux disease without esophagitis    3. Depression, unspecified depression type    4. Therapeutic drug monitoring  -     Compliance Drug Analysis, Ur - Urine, Clean Catch    She will continue her current regimen as ordered.  We will get a urine drug screen today along with updated contract.  Benny was reviewed and appropriate for refill on Xanax.  We have discussed her ongoing/increased anxiety and we will try referral for [facility] again.  Her paperwork was provided today and she will drop off to their office.    Follow Up   Return in about 6 months (around 8/28/2022).  Patient was given instructions and counseling regarding her condition or for health maintenance advice. Please see specific information pulled into the AVS if appropriate.

## 2022-02-28 NOTE — PROGRESS NOTES
"Chief Complaint  Annual Exam    Subjective          Janis Rosa presents to [facility]  History of Present Illness  This is a 61-year-old female patient here today for CPE.  Patient has a history of anxiety/depression.  She is currently on Effexor sore and Xanax with as needed.  She has been on this medication for many years and is doing well.  She has had several deaths in the last year and reports her anxiety is high.  At one time we did have a referral for [facility] but due to paperwork there was a miscommunication and she never was seen by them.  Her colonoscopy and mammogram is up-to-date and normal.  Colonoscopy is due again in 2023.  She is currently being followed by dermatology for chronic hives.  She has had a hysterectomy.  She is on omeprazole for GERD and doing well with no symptoms.      Objective   Vital Signs:   /80   Pulse 84   Temp 98.1 °F (36.7 °C)   Ht 172 cm (67.72\")   Wt 69.7 kg (153 lb 9.6 oz)   SpO2 96%   BMI 23.55 kg/m²     Physical Exam  Constitutional:       Appearance: Normal appearance.   HENT:      Head: Normocephalic.      Right Ear: Tympanic membrane normal.      Left Ear: Tympanic membrane normal.      Nose: Nose normal.      Mouth/Throat:      Mouth: Mucous membranes are moist.   Eyes:      Pupils: Pupils are equal, round, and reactive to light.   Cardiovascular:      Rate and Rhythm: Normal rate and regular rhythm.      Pulses: Normal pulses.      Heart sounds: Normal heart sounds.   Pulmonary:      Effort: Pulmonary effort is normal.      Breath sounds: Normal breath sounds.   Abdominal:      General: Bowel sounds are normal. There is no distension.      Palpations: There is no mass.   Musculoskeletal:         General: No swelling or tenderness.      Cervical back: Normal range of motion and neck supple.      Right lower leg: No edema.      Left lower leg: No edema.   Feet:      Comments:      Skin:     General: Skin is warm and dry. "   Neurological:      Mental Status: She is alert and oriented to person, place, and time.   Psychiatric:         Mood and Affect: Mood normal.         Behavior: Behavior normal.        Result Review :                 Assessment and Plan    Diagnoses and all orders for this visit:    1. Routine adult health maintenance (Primary)    2. Gastroesophageal reflux disease without esophagitis    3. Anxiety  -     Ambulatory Referral to Psychiatry    4. Depression, unspecified depression type    5. Therapeutic drug monitoring  -     Compliance Drug Analysis, Ur - Urine, Clean Catch    She will continue her current regimen as ordered.  We will get a urine drug screen today along with updated contract.  Benny was reviewed and appropriate for refill on Xanax.  We have discussed her ongoing/increased anxiety and we will try referral for [facility] again.  Her paperwork was provided today and she will drop off to their office.    Follow Up   Return in about 6 months (around 8/28/2022).  Patient was given instructions and counseling regarding her condition or for health maintenance advice. Please see specific information pulled into the AVS if appropriate.

## 2022-03-03 DIAGNOSIS — F41.9 ANXIETY: ICD-10-CM

## 2022-03-03 RX ORDER — ALPRAZOLAM 0.5 MG/1
0.5 TABLET ORAL 3 TIMES DAILY PRN
Qty: 90 TABLET | Refills: 1 | Status: SHIPPED | OUTPATIENT
Start: 2022-03-03 | End: 2022-04-14

## 2022-03-03 RX ORDER — VENLAFAXINE HYDROCHLORIDE 150 MG/1
150 CAPSULE, EXTENDED RELEASE ORAL DAILY
Qty: 30 CAPSULE | Refills: 5 | Status: SHIPPED | OUTPATIENT
Start: 2022-03-03 | End: 2022-04-14

## 2022-03-07 LAB — DRUGS UR: NORMAL

## 2022-03-14 DIAGNOSIS — F41.9 ANXIETY: Primary | ICD-10-CM

## 2022-03-25 ENCOUNTER — OFFICE VISIT (OUTPATIENT)
Dept: BEHAVIORAL HEALTH | Facility: CLINIC | Age: 62
End: 2022-03-25

## 2022-03-25 DIAGNOSIS — F33.1 MAJOR DEPRESSIVE DISORDER, RECURRENT EPISODE, MODERATE: Primary | ICD-10-CM

## 2022-03-25 PROCEDURE — 90791 PSYCH DIAGNOSTIC EVALUATION: CPT

## 2022-03-31 PROBLEM — F33.1 MAJOR DEPRESSIVE DISORDER, RECURRENT EPISODE, MODERATE: Status: ACTIVE | Noted: 2022-03-31

## 2022-03-31 NOTE — PROGRESS NOTES
Subjective   Patient ID: Janis Rosa is a 61 y.o. female is here today as a self referral..     Chief Complaint: Reports experiencing symptoms of depression and anxiety.  Patient also reports difficulty with managing emotional response to unresolved childhood trauma.    History of Present Illness: Reports a long long history beginning in childhood of depression, anxiety, and trauma.    The following portions of the patient's history were reviewed and updated as appropriate: allergies, current medications, past family history, past medical history, past social history, past surgical history and problem list.    Past Psych History: Patient reports no history of mental health services in the form of therapy.  Patient reports previous experience with psychiatrist and being prescribed medication.    Substance Use History: Patient reports substance use history of excessive drinking.  Patient reports a history of 2 years of sobriety.  Patient reports she is currently excessively drinking again.  Patient also reports she smokes cigarettes.    Medical History: Patient did not relate any medical history during initial evaluation.  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    s/p excisional biopsy, rt breast w/ mammogram needle localization   • Diverticulitis    • Endometrial cancer (HCC)    • Epigastric pain    • Hoarse voice quality    • Melanoma (HCC)    • Panic attack    • Transverse myelitis (HCC)     HISTORY OF YEARS AGO        Past Surgical History:   Procedure Laterality Date   • APPENDECTOMY     • BREAST BIOPSY Right 03/02/2011    right breast stereotactic biopsy   • BREAST EXCISIONAL BIOPSY Right 03/11/2011    Excision biopsy, right breast with mammogram needle localization-Dr. Robert Hurley   • COLON RESECTION N/A 3/19/2018    Procedure: LAPAROSCOPIC SIGMOID COLON RESECTION;  Surgeon: Jaycob Anthony MD;  Location: [facility];  Service: General    • COLONOSCOPY N/A 1/17/2018    Procedure: FLEXIBLE SIGMOIDOSCPY TO 50 CM;  Surgeon: Jaycob Anthony MD;  Location: [facility] ENDOSCOPY;  Service:    • ENDOSCOPY N/A 4/19/2021    Procedure: ESOPHAGOGASTRODUODENOSCOPY WITH COLD BIOPSIES;  Surgeon: Cali Hall MD;  Location: [facility] ENDOSCOPY;  Service: Gastroenterology;  Laterality: N/A;  PRE: DYSPHAGIA, REFLUX  POST: ESOPHAGITIS, IRREGULAR Z LINE   • HYSTERECTOMY     • SHOULDER SURGERY     • SKIN CANCER EXCISION      melanoma removed from neck   • TONSILLECTOMY AND ADENOIDECTOMY         Medications:   Current Outpatient Medications:   •  ALPRAZolam (XANAX) 0.5 MG tablet, Take 1 tablet by mouth 3 (Three) Times a Day As Needed for Anxiety. for anxiety, Disp: 90 tablet, Rfl: 1  •  ciprofloxacin-dexamethasone (CIPRODEX) 0.3-0.1 % otic suspension, instill FOUR TO FIVE drops into affected ear(s) TWICE DAILY, Disp: , Rfl:   •  omeprazole (priLOSEC) 20 MG capsule, take 1 capsule by MOUTH twice daily, Disp: 60 capsule, Rfl: 3  •  venlafaxine XR (EFFEXOR-XR) 150 MG 24 hr capsule, Take 1 capsule by mouth Daily., Disp: 30 capsule, Rfl: 5    Allergies   Allergen Reactions   • Percocet [Oxycodone-Acetaminophen] Nausea Only   • Morphine And Related Rash   • Sulfa Antibiotics Rash       Review of Systems    Family History: Patient reports having a bad childhood.  Patient expressed her parents were alcoholics.  Patient shared she is  to her  of 34 years in total.  Patient reports they  and  and remarried in 2007.  Patient shared her  works for an offshore oil company and is gone for 28 days at a time and home for 28 days.  Patient reports having a better relationship with her mother presently but a history of being triggered by her mother.  Patient expressed she does not share deep feelings or thoughts with her mother.  Patient reports having a great relationship with her children.  Patient shared that her oldest is a preacher.  She  shared losing a number of family members in a 9-month period.  Patient reports her mother, brother, daughter, 2 stepsons, and grandchildren live in [facility].  Family History   Problem Relation Age of Onset   • COPD Mother    • Heart disease Mother    • Breast cancer Maternal Grandfather    • Esophageal cancer Maternal Grandfather    • Malig Hyperthermia Neg Hx        Social History: Patient reports not having much of a social life.  Patient reports she is retired and spends a lot of time at home.    Objective   Mental Status Exam  Hygiene:  good  Dress:  casual  Attitude:  Cooperative  Motor Activity:  Appropriate  Speech:  Normal  Mood:  labile and sad  Affect:  labile and depressed  Thought Processes:  Goal directed  Thought Content:  normal  Suicidal Thoughts:  denies  Homicidal Thoughts:  denies  Crisis Safety Plan: yes, to come to the emergency room.  Hallucinations:  denies      Strengths: Patient reports her strengths are that she is a survivor, determined, kind to those in need.    Weaknesses: Patient shared her weaknesses are her temper, being easily taken for granted, feeling misunderstood.    Problems include but are not limited to: childhood traumas, depression, marital stress and substance use      Short term goals: Provide safe, confidential environment to facilitate the development of positive therapeutic relationship and encourage open, honest communication. Patient will maintain stability and avoid higher level of care.     Long term goals: The patient will have complete cessation of symptoms and be able to function at optimal levels without the need for continued treatment.      Assessment/Plan   Diagnoses and all orders for this visit:    1. Major depressive disorder, recurrent episode, moderate (HCC) (Primary)        Plan:  Return in 2 weeks (on 4/8/2022) for Next scheduled follow up.      Patient will continue in individual outpatient therapy session [facility] every 2 weeks and pharmacotherapy as scheduled.  Patient will adhere to medication regimen as prescribed and report any side effects. Patient will contact this office, call 911 or present to the nearest emergency room should suicidal or homicidal ideations occur.       This document signed by Janee Childs LCSW March 31, 2022 15:37 EDT

## 2022-04-14 ENCOUNTER — OFFICE VISIT (OUTPATIENT)
Dept: BEHAVIORAL HEALTH | Facility: CLINIC | Age: 62
End: 2022-04-14

## 2022-04-14 VITALS
HEART RATE: 110 BPM | DIASTOLIC BLOOD PRESSURE: 70 MMHG | SYSTOLIC BLOOD PRESSURE: 130 MMHG | BODY MASS INDEX: 22.69 KG/M2 | WEIGHT: 148 LBS | RESPIRATION RATE: 18 BRPM | OXYGEN SATURATION: 95 %

## 2022-04-14 DIAGNOSIS — Z63.8 FAMILY CONFLICT: ICD-10-CM

## 2022-04-14 DIAGNOSIS — F41.1 GENERALIZED ANXIETY DISORDER WITH PANIC ATTACKS: ICD-10-CM

## 2022-04-14 DIAGNOSIS — F41.0 GENERALIZED ANXIETY DISORDER WITH PANIC ATTACKS: ICD-10-CM

## 2022-04-14 DIAGNOSIS — F33.1 MAJOR DEPRESSIVE DISORDER, RECURRENT EPISODE, MODERATE: Primary | ICD-10-CM

## 2022-04-14 DIAGNOSIS — Z13.39 SCREENING FOR ALCOHOLISM: ICD-10-CM

## 2022-04-14 PROCEDURE — 90792 PSYCH DIAG EVAL W/MED SRVCS: CPT

## 2022-04-14 RX ORDER — HYDROXYZINE PAMOATE 25 MG/1
25 CAPSULE ORAL DAILY PRN
Qty: 30 CAPSULE | Refills: 1 | Status: SHIPPED | OUTPATIENT
Start: 2022-04-14 | End: 2022-05-19

## 2022-04-14 RX ORDER — VENLAFAXINE HYDROCHLORIDE 75 MG/1
225 TABLET, EXTENDED RELEASE ORAL DAILY
Qty: 90 EACH | Refills: 1 | Status: SHIPPED | OUTPATIENT
Start: 2022-04-14 | End: 2022-05-19

## 2022-04-14 SDOH — SOCIAL STABILITY - SOCIAL INSECURITY: OTHER SPECIFIED PROBLEMS RELATED TO PRIMARY SUPPORT GROUP: Z63.8
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
## 2022-04-14 NOTE — PATIENT INSTRUCTIONS
1.  Medication changes today,  yes, increase Effexor XR to 225 mg/day and try atarax/hydroxyzine 25 mg once a day as needed anxiety . OK to continue xanax as needed (don't take together with atarax or alcohol)  2.  Monitor for side effects/improvement report to ARNP immediately     3.  Follow-up with ARNP in  4 weeks, or sooner if needed.  4.  Increase positive coping skills as tolerated (light/moderate exercise, hobbies, art, music, mediation, yoga, journal, friends/family/pets, etc)  5.  Potential treatment options to consider in the future, grief/loss support groups.  6.  Psycotherapy, yes, is seeing Janee Childs LCSW.      Education: Please read/review attached documents, reach out with questions/concerns
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
## 2022-04-15 NOTE — ASSESSMENT & PLAN NOTE
Initial evaluation by Rodger PALMA 4-14-22  Current alcohol use includes 3 Vodka mixers a night, will drink 4 days a week.  Patient reports a prior history of 3 years of sobriety, when asked why patient reports she just did not like how it made her feel, patient reports a prior incident (few month duration) where she was drinking nearly every day, that has improved since.  Triggered by death of multiple friends/family members.    Patient denies history of waking up first thing in the morning shaky or on edge or sweaty, patient denies drinking alcohol for breakfast, patient denies drinking throughout the day, patient denies prior treatment for alcohol or substance abuse, patient denies negative consequences of drinking, patient denies DUIs, patient reports her mom thinks she should cut back on drinking

## 2022-04-15 NOTE — ASSESSMENT & PLAN NOTE
Initial evaluation by Rodger PALMA 4-14-22  S/S of condition Identified. Illness is currently active and described as moderate in severity. S/S impair ability function in day-to-day activities or cause undo distress. Pt current condition is unlikely to improve without continued treatment/monitoring, treatment consists of pharmacological and non-pharmacological interventions, changes to treatment agreed upon by pt and MINERVA, will re-evaluate response at next scheduled apt.    1.  Medication changes today,  yes, increase Effexor XR to 225 mg/day and try atarax/hydroxyzine 25 mg once a day as needed anxiety . OK to continue xanax as needed (don't take together with atarax or alcohol)  2.  Monitor for side effects/improvement report to MINERVA immediately     3.  Follow-up with MINERVA in  4 weeks, or sooner if needed.  4.  Increase positive coping skills as tolerated (light/moderate exercise, hobbies, art, music, mediation, yoga, journal, friends/family/pets, etc)  5.  Potential treatment options to consider in the future, grief/loss support groups.  6.  Psycotherapy, yes, is seeing Janee Childs LCSW.      Education: Please read/review attached documents, reach out with questions/concerns

## 2022-04-21 RX ORDER — FLUCONAZOLE 150 MG/1
150 TABLET ORAL ONCE
Qty: 1 TABLET | Refills: 1 | Status: SHIPPED | OUTPATIENT
Start: 2022-04-21 | End: 2022-06-07

## 2022-04-27 ENCOUNTER — OFFICE VISIT (OUTPATIENT)
Dept: BEHAVIORAL HEALTH | Facility: CLINIC | Age: 62
End: 2022-04-27

## 2022-04-27 DIAGNOSIS — F33.1 MAJOR DEPRESSIVE DISORDER, RECURRENT EPISODE, MODERATE: Primary | Chronic | ICD-10-CM

## 2022-04-27 DIAGNOSIS — F41.0 GENERALIZED ANXIETY DISORDER WITH PANIC ATTACKS: Chronic | ICD-10-CM

## 2022-04-27 DIAGNOSIS — F41.1 GENERALIZED ANXIETY DISORDER WITH PANIC ATTACKS: Chronic | ICD-10-CM

## 2022-04-27 DIAGNOSIS — Z63.8 FAMILY CONFLICT: ICD-10-CM

## 2022-04-27 PROCEDURE — 90834 PSYTX W PT 45 MINUTES: CPT

## 2022-04-27 SDOH — SOCIAL STABILITY - SOCIAL INSECURITY: OTHER SPECIFIED PROBLEMS RELATED TO PRIMARY SUPPORT GROUP: Z63.8

## 2022-04-27 NOTE — PROGRESS NOTES
Date of Service: April 27, 2022  Time In: 12:45pm  Time Out: 1:23pm    IDENTIFYING  INFORMATION:   Janis Rosa is a 61 y.o. female who met 1:1 with the undersigned for a regularly scheduled individual outpatient therapy session at [address] [place] [address].     HPI: History of Present Illness:Reports a long long history beginning in childhood of depression, anxiety, and trauma.      Clinical Maneuvering/Intervention:     The main therapeutic interventions consisted of: Cognitive behavioral therapy and EMDR. Specifically focusing on: Resourcing and developing coping skills and individual treatment goals in addressing     ICD-10-CM ICD-9-CM   1. Major depressive disorder, recurrent episode, moderate (HCC)  F33.1 296.32   2. Generalized anxiety disorder with panic attacks  F41.1 300.02    F41.0 300.01   3. Family conflict  Z63.8 V61.9    . Facilitating therapist was able to assist patient in ability to develop a container, practicing utilization of a container with a mildly disturbing recent experience, and consider appropriate tools and coping skills to implement thus far in addressing depression, anxiety, trauma and avoid decompensation and the need for higher level of care.     Allowed patient to freely discuss issues without interruption or judgment. Provided safe, confidential environment to facilitate the development and maintenance of positive therapeutic relationship. Assisted patient in identifying increased risk factors which would indicate the need for higher level of care including thoughts to harm self or others and/or self-harming behavior and encouraged patient to contact this office, call 911, or present to nearest emergency room should either event occur. Discussed crisis intervention services and means to access.        Assessment:     DIAGNOSIS:   Assessment/Plan   Diagnoses and all orders for this visit:    1. Major depressive disorder, recurrent episode, moderate (HCC)  (Primary)    2. Generalized anxiety disorder with panic attacks    3. Family conflict          Mental Status Exam:   Hygiene:   good  Cooperation:  Cooperative  Eye Contact:  Fair  Psychomotor Behavior:  Appropriate  Affect:  Appropriate  Speech:  Normal  Thought Progress:  Goal directed  Thought Content:  Mood congruent  Suicidal:  None  Homicidal:  None  Hallucinations:  None  Delusion:  None  Memory:  Intact  Orientation:  Grossly intact  Reliability:  fair  Insight:  Fair  Judgement:  Impaired  Impulse Control:  Impaired        Patient's Support Network Includes:   Patient reports her , family, friends     Progress toward goal: Developments continue in the area of developing coping skills. The ongoing treatment plan includes therapeutic work on anxiety and depression management. Current outstanding therapeutic issues are: Unresolved trauma. Treatment to continue as indicated.     Functional Status: Moderate impairment      Prognosis: Fair with Ongoing Treatment         Plan      Return in 2 weeks (on 5/11/2022) for Next scheduled follow up.    Patient will continue in individual outpatient therapy session [facility] every 2 weeks and pharmacotherapy as scheduled.  Patient will adhere to medication regimen as prescribed and report any side effects. Patient will contact this office, call 911 or present to the nearest emergency room should suicidal or homicidal ideations occur.        This document signed by Janee Childs LCSW electronically Janee Childs LCSW  May 4, 2022 11:32 EDT      Part of this note may be an electronic transcription/translation of spoken language to printed text using the Dragon Dictation System.

## 2022-05-05 ENCOUNTER — OFFICE VISIT (OUTPATIENT)
Dept: FAMILY MEDICINE CLINIC | Facility: CLINIC | Age: 62
End: 2022-05-05

## 2022-05-05 VITALS
SYSTOLIC BLOOD PRESSURE: 118 MMHG | HEART RATE: 102 BPM | RESPIRATION RATE: 20 BRPM | TEMPERATURE: 97.7 F | HEIGHT: 68 IN | BODY MASS INDEX: 22.13 KG/M2 | OXYGEN SATURATION: 100 % | DIASTOLIC BLOOD PRESSURE: 76 MMHG | WEIGHT: 146 LBS

## 2022-05-05 DIAGNOSIS — M25.561 PAIN AND SWELLING OF KNEE, RIGHT: Primary | ICD-10-CM

## 2022-05-05 DIAGNOSIS — S89.91XA INJURY OF RIGHT KNEE, INITIAL ENCOUNTER: ICD-10-CM

## 2022-05-05 DIAGNOSIS — M25.461 PAIN AND SWELLING OF KNEE, RIGHT: Primary | ICD-10-CM

## 2022-05-05 PROCEDURE — 99214 OFFICE O/P EST MOD 30 MIN: CPT | Performed by: NURSE PRACTITIONER

## 2022-05-05 RX ORDER — ALPRAZOLAM 0.5 MG/1
0.5 TABLET ORAL 3 TIMES DAILY PRN
COMMUNITY
End: 2022-05-20

## 2022-05-05 RX ORDER — DICLOFENAC SODIUM 75 MG/1
75 TABLET, DELAYED RELEASE ORAL 2 TIMES DAILY PRN
Qty: 60 TABLET | Refills: 0 | Status: SHIPPED | OUTPATIENT
Start: 2022-05-05 | End: 2022-11-29

## 2022-05-05 NOTE — PROGRESS NOTES
"Chief Complaint  Knee Pain (Right knee pain 3 weeks ago) and Joint Swelling (Right knee)    Subjective          Janis Rosa presents to [facility]  History of Present Illness  This is a 61-year-old female patient here today for complaints of right knee pain.  Patient reports bowling 3 weeks ago where when she went to throw the ball her right foot got stuck and she hurt her knee pop.  Since then she has had pain and swelling on the right knee and behind her knee.  She also reports pain that goes up the back of her leg that is intermittent.       Objective   Vital Signs:  /76   Pulse 102   Temp 97.7 °F (36.5 °C)   Resp 20   Ht 172 cm (67.72\")   Wt 66.2 kg (146 lb)   SpO2 100%   BMI 22.39 kg/m²     BMI is within normal parameters. No follow-up required.      Physical Exam  Vitals and nursing note reviewed.   Constitutional:       Appearance: Normal appearance.   Musculoskeletal:      Right knee: Swelling and effusion present. Tenderness present.   Neurological:      Mental Status: She is alert.        Result Review :                 Assessment and Plan    Diagnoses and all orders for this visit:    1. Pain and swelling of knee, right (Primary)  -     XR Knee 1 or 2 View Right (In Office)    2. Injury of right knee, initial encounter  -     XR Knee 1 or 2 View Right (In Office)    Other orders  -     diclofenac (VOLTAREN) 75 MG EC tablet; Take 1 tablet by mouth 2 (Two) Times a Day As Needed (prn).  Dispense: 60 tablet; Refill: 0      X-ray was obtained and appears normal.  I will send for radiologist to review as well.  I will send her over Voltaren.  If this medication does not help she will let me know.       Follow Up   No follow-ups on file.  Patient was given instructions and counseling regarding her condition or for health maintenance advice. Please see specific information pulled into the AVS if appropriate.       "

## 2022-05-09 DIAGNOSIS — M25.461 PAIN AND SWELLING OF KNEE, RIGHT: ICD-10-CM

## 2022-05-09 DIAGNOSIS — M25.561 PAIN AND SWELLING OF KNEE, RIGHT: ICD-10-CM

## 2022-05-09 DIAGNOSIS — S89.91XA INJURY OF RIGHT KNEE, INITIAL ENCOUNTER: Primary | ICD-10-CM

## 2022-05-10 ENCOUNTER — DOCUMENTATION (OUTPATIENT)
Dept: FAMILY MEDICINE CLINIC | Facility: CLINIC | Age: 62
End: 2022-05-10

## 2022-05-10 DIAGNOSIS — M25.561 PAIN AND SWELLING OF KNEE, RIGHT: Primary | ICD-10-CM

## 2022-05-10 DIAGNOSIS — M25.461 PAIN AND SWELLING OF KNEE, RIGHT: Primary | ICD-10-CM

## 2022-05-10 RX ORDER — TRAMADOL HYDROCHLORIDE 50 MG/1
50 TABLET ORAL EVERY 6 HOURS PRN
Qty: 20 TABLET | Refills: 0 | Status: SHIPPED | OUTPATIENT
Start: 2022-05-10 | End: 2022-07-25 | Stop reason: SDUPTHER

## 2022-05-10 NOTE — PROGRESS NOTES
Spoke with patient. Still having rt knee pain. MRI is ordered and not scheduled until 6/7. She is traveling and requesting something to help with pain. Will give her tramadol prn and will work on another option for MRI.

## 2022-05-11 ENCOUNTER — OFFICE VISIT (OUTPATIENT)
Dept: ORTHOPEDIC SURGERY | Facility: CLINIC | Age: 62
End: 2022-05-11

## 2022-05-11 VITALS — HEIGHT: 68 IN | WEIGHT: 148 LBS | BODY MASS INDEX: 22.43 KG/M2 | TEMPERATURE: 96.6 F

## 2022-05-11 DIAGNOSIS — M25.561 ACUTE PAIN OF RIGHT KNEE: Primary | ICD-10-CM

## 2022-05-11 DIAGNOSIS — R52 PAIN: ICD-10-CM

## 2022-05-11 PROCEDURE — 73562 X-RAY EXAM OF KNEE 3: CPT | Performed by: ORTHOPAEDIC SURGERY

## 2022-05-11 PROCEDURE — 99213 OFFICE O/P EST LOW 20 MIN: CPT | Performed by: ORTHOPAEDIC SURGERY

## 2022-05-11 PROCEDURE — 72170 X-RAY EXAM OF PELVIS: CPT | Performed by: ORTHOPAEDIC SURGERY

## 2022-05-12 NOTE — PROGRESS NOTES
General Exam    Patient: Janis Rosa    YOB: 1960    Medical Record Number: [id number]    Chief Complaints: Right knee pain    History of Present Illness:     61 y.o. female patient who presents for evaluation treatment right knee pain.  Patient states for the past 3 Weeks she been having some issues ever since she was bowling and stepped funny felt a pop.  States initially the leg and knee hurt mainly on the lateral side the knee and posteriorly.  Overall she feels better now just a dull ache mainly.  Most of her pain now is along the lateral aspect of her patella.  Denies any overt joint line pain or mechanical symptoms.  Overall she feels her symptoms are improving.  States she can perform normal daily activities.    Denies any numbness or tingling.  Denies any fevers, cough or shortness of breath.    Allergies:   Allergies   Allergen Reactions   • Percocet [Oxycodone-Acetaminophen] Nausea Only   • Morphine And Related Rash   • Sulfa Antibiotics Rash       Home Medications:      Current Outpatient Medications:   •  ALPRAZolam (XANAX) 0.5 MG tablet, Take 0.5 mg by mouth 3 (Three) Times a Day As Needed for Anxiety. Prn, Disp: , Rfl:   •  diclofenac (VOLTAREN) 75 MG EC tablet, Take 1 tablet by mouth 2 (Two) Times a Day As Needed (prn)., Disp: 60 tablet, Rfl: 0  •  hydrOXYzine pamoate (Vistaril) 25 MG capsule, Take 1 capsule by mouth Daily As Needed for Anxiety., Disp: 30 capsule, Rfl: 1  •  omeprazole (priLOSEC) 20 MG capsule, take 1 capsule by MOUTH twice daily, Disp: 60 capsule, Rfl: 3  •  traMADol (ULTRAM) 50 MG tablet, Take 1 tablet by mouth Every 6 (Six) Hours As Needed for Moderate Pain ., Disp: 20 tablet, Rfl: 0  •  venlafaxine 75 MG tablet sustained-release 24 hour 24 hr tablet, Take 3 tablets by mouth Daily. (Patient taking differently: Take 225 mg by mouth Daily. Po qd), Disp: 90 each, Rfl: 1    Past Medical History:   Diagnosis Date   • Abdominal pain    • Atypical hyperplasia of right  breast 2011    s/p excisional biopsy, rt breast w/ mammogram needle localization   • Depression    • Diverticulitis    • Endometrial cancer (HCC)    • Epigastric pain    • Hoarse voice quality    • Melanoma (HCC)    • Panic attack    • Screening for alcoholism 2022   • Transverse myelitis (HCC)     HISTORY OF YEARS AGO       Past Surgical History:   Procedure Laterality Date   • APPENDECTOMY     • BREAST BIOPSY Right 2011    right breast stereotactic biopsy   • BREAST EXCISIONAL BIOPSY Right 2011    Excision biopsy, right breast with mammogram needle localization-Dr. Robert Hurley   • COLON RESECTION N/A 3/19/2018    Procedure: LAPAROSCOPIC SIGMOID COLON RESECTION;  Surgeon: Jaycob Anthony MD;  Location: [facility] MAIN OR;  Service: General   • COLONOSCOPY N/A 2018    Procedure: FLEXIBLE SIGMOIDOSCPY TO 50 CM;  Surgeon: Jaycob Anthony MD;  [facility] ENDOSCOPY;  Service:    • ENDOSCOPY N/A 2021    Procedure: ESOPHAGOGASTRODUODENOSCOPY WITH COLD BIOPSIES;  Surgeon: Cali Hall MD;  Location: [facility] ENDOSCOPY;  Service: Gastroenterology;  Laterality: N/A;  PRE: DYSPHAGIA, REFLUX  POST: ESOPHAGITIS, IRREGULAR Z LINE   • HYSTERECTOMY     • SHOULDER SURGERY     • SKIN CANCER EXCISION      melanoma removed from neck   • TONSILLECTOMY AND ADENOIDECTOMY         Social History     Occupational History   • Occupation: Retired in      Comment: Multiple jobs over the years, 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   Tobacco Use   • Smoking status: Former Smoker     Packs/day: 0.25     Years: 30.00     Pack years: 7.50     Types: Cigarettes     Start date: 1987     Quit date: 2020     Years since quittin.3   • Smokeless tobacco: Never Used   • Tobacco comment: using a vape cig   Vaping Use   • Vaping Use: Every day   • Substances: Nicotine, Flavoring   • Devices: Pre-filled or refillable cartridge   Substance and Sexual Activity   • Alcohol use: Yes     Alcohol/week: 12.0 standard  "drinks     Types: 12 Shots of liquor per week     Comment: Patient drinks 3 vodka mixers, 4 nights a week   • Drug use: Yes     Types: Other     Comment: Caffeine, 2 cups a day   • Sexual activity: Defer      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, patient reports living in [place], is , partner's name is Bharath they have been together 34 years, patient has 3 stepchildren, no history of pregnancies reported, patient identifies as a [employer] and goes to [employer] weekly       Family History   Problem Relation Age of Onset   • Bipolar disorder Mother         No formal diagnosis   • COPD Mother    • Heart disease Mother    • Alcohol abuse Father    • Bipolar disorder Father    • Suicide Attempts Paternal Uncle    • Breast cancer Maternal Grandfather    • Esophageal cancer Maternal Grandfather    • Malig Hyperthermia Neg Hx        Review of Systems:      Constitutional: Denies fever, shaking or chills         All other pertinent positives and negatives as noted above in HPI.    Physical Exam: 61 y.o. female    Vitals:    05/11/22 0944   Temp: 96.6 °F (35.9 °C)   Weight: 67.1 kg (148 lb)   Height: 172.7 cm (68\")       General:  Patient is awake and alert.  Appears in no acute distress or discomfort.        Musculoskeletal/Extremities:    Right lower extremity examined overall knee range of motion is full and painless.  Knee stable varus nonstressed.  No tenderness along the joint line.  Some tenderness along the lateral superior pole of the patella.  Also some tenderness along the quadriceps tendon.  She can perform straight leg raise.  Hip range of motion full and painless.  Distal motor or sensory intact.  Negative anterior drawer, posterior drawer, Lachman and Syeda's.         Radiology:       AP pelvis and 4 views of the right knee include AP, lateral, PA notch and sunrise taken reviewed to evaluate the " patient's complaint/s.    AP pelvis shows some mild degenerative changes really involving some sclerotic bone along the acetabulum may be some early cystic changes.    Knee imaging shows some tricompartmental degenerative changes with some early bone sclerosis no fractures noted.     No imaging for comparison.    Assessment: Knee pain with some underlying right knee osteoarthritis, quadriceps tendinitis      Plan:      Given the findings this seems to be maybe she just started her need up she does have some early arthritic signs and on exam some soft tissue tenderness concerning for possible quadriceps tendinitis.  Overall her systems are continue to improve.  She states normal daily activities she does okay overall.  She does have an MRI ordered think it is fair to go ahead and get that done just for further assessment just to rule out any internal derangement.  Told patient to give us a call several days after MRI is completed we can review results and discuss steps forward.   During the interim however she can take some anti-inflammatories as needed for symptom management.        We will plan for follow up as needed.    All questions were answered.  Patient understands and agrees with the plan.    Bartolo Young MD    05/11/2022    CC to Kim Galindo APRN

## 2022-05-19 ENCOUNTER — OFFICE VISIT (OUTPATIENT)
Dept: BEHAVIORAL HEALTH | Facility: CLINIC | Age: 62
End: 2022-05-19

## 2022-05-19 VITALS
WEIGHT: 146 LBS | HEIGHT: 68 IN | SYSTOLIC BLOOD PRESSURE: 130 MMHG | RESPIRATION RATE: 16 BRPM | DIASTOLIC BLOOD PRESSURE: 80 MMHG | OXYGEN SATURATION: 99 % | HEART RATE: 97 BPM | BODY MASS INDEX: 22.13 KG/M2

## 2022-05-19 DIAGNOSIS — F33.1 MAJOR DEPRESSIVE DISORDER, RECURRENT EPISODE, MODERATE: Primary | ICD-10-CM

## 2022-05-19 DIAGNOSIS — F41.0 GENERALIZED ANXIETY DISORDER WITH PANIC ATTACKS: ICD-10-CM

## 2022-05-19 DIAGNOSIS — F41.1 GENERALIZED ANXIETY DISORDER WITH PANIC ATTACKS: ICD-10-CM

## 2022-05-19 PROCEDURE — 99213 OFFICE O/P EST LOW 20 MIN: CPT

## 2022-05-19 RX ORDER — BUSPIRONE HYDROCHLORIDE 10 MG/1
10 TABLET ORAL 2 TIMES DAILY
Qty: 60 TABLET | Refills: 1 | Status: SHIPPED | OUTPATIENT
Start: 2022-05-19 | End: 2022-07-22

## 2022-05-19 RX ORDER — VENLAFAXINE HYDROCHLORIDE 75 MG/1
225 TABLET, EXTENDED RELEASE ORAL DAILY
Qty: 90 EACH | Refills: 1 | Status: SHIPPED | OUTPATIENT
Start: 2022-05-19 | End: 2022-07-25 | Stop reason: SDUPTHER

## 2022-05-19 NOTE — ASSESSMENT & PLAN NOTE
Psychological condition is unchanged.  Medication changes per orders.  Psychological condition  will be reassessed at the next regular appointment.    • Medication: Buspar/buspirone 10 mg twice a day, stop vistaril due to lack of benefit, OK to continue Effexor as previously ordered  • Refills: EFFEXOR XR 75 mg (3) a day (225 mg total) a day sent to [facility] in [place]  • Follow up:  2 months , or sooner if needed  • Monitor for side effects/improvement report to [facility] immediately     • Continue counseling with Janee Childs LCSW  • Education: Please read/review attached documents, reach out with questions/concerns      Increase positive coping skills as tolerated (light/moderate exercise, hobbies, art, music, mediation, yoga, journal, friends/family/pets, etc) to aid in overall health and help reduce stress.

## 2022-05-19 NOTE — ASSESSMENT & PLAN NOTE
Patient's depression is recurrent and is moderate without psychosis. Their depression is currently in partial remission and the condition is improving with treatment. This will be reassessed at the next regular appointment. F/U as described:patient was prescribed an antidepressant medicine.    • Medication: Buspar/buspirone 10 mg twice a day, stop vistaril due to lack of benefit, OK to continue Effexor as previously ordered  • Refills: EFFEXOR XR 75 mg (3) a day (225 mg total) a day sent to [facility] in [place]  • Follow up:  2 months , or sooner if needed  • Monitor for side effects/improvement report to [facility] immediately     • Continue counseling with Janee Childs LCSW  • Education: Please read/review attached documents, reach out with questions/concerns      Increase positive coping skills as tolerated (light/moderate exercise, hobbies, art, music, mediation, yoga, journal, friends/family/pets, etc) to aid in overall health and help reduce stress.

## 2022-05-19 NOTE — PROGRESS NOTES
[facility]  [facility] BEHAVIORAL HEALTH  [address]  [facility] [address] [phone]     Behavioral Health Note  Subjective   Janis Rosa is a 61 y.o. female who presents today for follow up     Chief Complaint:  Depression & Anxiety    HPI:   Patient last seen roughly 4 weeks ago, at that time Effexor was increased to 225 mg a day, and Atarax 25 mg 1 as needed daily anxiety prescribed.  Patient reports she is doing fairly well overall, believes the medication is really helping taking the edge off, she recently went out of town and had to bury her father's ashes, which is making her more emotional.  Patient reports that as needed Vistaril was not really helping much, has also stopped taking her Xanax per PCP and myself recommendation due to advancing age and risk/benefits of benzos.  Risk/benefit of BuSpar discussed, and verbalized understanding,  patient was on Xanax since 2005, no SS WG reported.  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  Patient denies new medications or new medical problems since last seen, no other issues voiced.    Overview of medications conducted, risks/benefits discussed as well as potential side effects, pt verbalized understanding. It was agreed upon by provider and patient to continue current medications (add buspar twice a day), monitor for side effects/improvement and reassess at next follow up appointment. Pt agreeable to reach out to provider for new/worsening symptoms or change in status.     Specialty Problems        Psychiatry Problems    Major depressive disorder, recurrent episode, moderate (HCC)        Generalized anxiety disorder with panic attacks             The following portions of the patient's history were reviewed and updated as appropriate: allergies,  current medications, past family history, past medical history, past surgical history and problem list.    Patient Active Problem List   Diagnosis   • Rectal bleeding   • Lower abdominal pain   • Diverticulitis of large intestine without perforation or abscess without bleeding   • Diverticulitis large intestine   • Primary osteoarthritis of right knee   • Candida, oral   • Dysuria   • Gastroesophageal reflux disease   • Esophageal dysphagia   • Breast cancer (HCC)   • Endometrial cancer (HCC)   • Myelitis (HCC)   • Major depressive disorder, recurrent episode, moderate (HCC)   • Generalized anxiety disorder with panic attacks   • Family conflict     Medical History    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    s/p excisional biopsy, rt breast w/ mammogram needle localization   • Depression    • Diverticulitis    • Endometrial cancer (HCC)    • Epigastric pain    • Hoarse voice quality    • Melanoma (HCC)    • Panic attack    • Screening for alcoholism 4/14/2022   • Transverse myelitis (HCC)     HISTORY OF YEARS AGO       Past Medical History Pertinent Negatives:   Diagnosis Date Noted   • Bipolar disorder ([facility]) 04/14/2022    No history of reported   • Borderline personality disorder ([facility]) 04/14/2022    No history of reported   • Hard to intubate 01/16/2018   • Head injury 04/14/2022    No history of reported   • Liver disease 04/14/2022    No history of reported   • Malignant hyperthermia due to anesthesia 01/16/2018   • Obsessive-compulsive disorder 04/14/2022    No history of reported   • PONV (postoperative nausea and vomiting) 01/16/2018   • Psychosis ([facility]) 04/14/2022    No history of reported   • Seizures ([facility]) 04/14/2022    No history of reported   • Spinal headache 01/16/2018   • Substance abuse ([facility]) 04/14/2022    No history of reported     Surgical History   has a past surgical history that includes Breast excisional biopsy (Right,  2011); Skin cancer excision; Tonsillectomy and adenoidectomy; Hysterectomy; Appendectomy; Breast biopsy (Right, 2011); Colectomy (N/A, 3/19/2018); Shoulder surgery; Colonoscopy (N/A, 2018); and Esophagogastroduodenoscopy (N/A, 2021).   Family History  family history includes Alcohol abuse in her father; Bipolar disorder in her father and mother; Breast cancer in her maternal grandfather; COPD in her mother; Esophageal cancer in her maternal grandfather; Heart disease in her mother; Suicide Attempts in her paternal uncle.  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, patient reports living in [place], is , partner's name is Bharath they have been together 34 years, patient has 3 stepchildren, no history of pregnancies reported, patient identifies as a [employer] and goes to [employer] weekly      Social History     Tobacco Use   • Smoking status: Former Smoker     Packs/day: 0.25     Years: 30.00     Pack years: 7.50     Types: Cigarettes     Start date: 1987     Quit date: 2020     Years since quittin.3   • Smokeless tobacco: Never Used   • Tobacco comment: using a vape cig   Vaping Use   • Vaping Use: Every day   • Substances: Nicotine, Flavoring   • Devices: Pre-filled or refillable cartridge   Substance Use Topics   • Alcohol use: Yes     Alcohol/week: 12.0 standard drinks     Types: 12 Shots of liquor per week     Comment: Patient drinks 3 vodka mixers, 4 nights a week   • Drug use: Yes     Types: Other     Comment: Caffeine, 2 cups a day      Review of Systems   Constitutional: Negative.    Respiratory: Negative for chest tightness and shortness of breath.   Cardiovascular: Negative for chest pain.   Gastrointestinal: Negative for nausea and vomiting.   Musculoskeletal: Negative for gait problem.     Objective   Physical Exam  Constitutional:       Appearance:  "Normal appearance, clothing appropriate for age, appears in no acute distress  Musculoskeletal:         General: No problems with ambulation reported   Facial Expression:      Speech: Normal clear concise, no slurring or vocal tics observed.  Respiratory      Breaths appear even and unlabored, no cough observed.       Vitals  Blood pressure 130/80, pulse 97, resp. rate 16, height 172.7 cm (68\"), weight 66.2 kg (146 lb), SpO2 99 %, not currently breastfeeding.  Body mass index is 22.2 kg/m².   Labs/Results  Recent Results (from the past 2016 hour(s))   Compliance Drug Analysis, Ur - Urine, Clean Catch    Collection Time: 02/28/22 11:32 AM    Specimen: Urine, Clean Catch   Result Value Ref Range    Report Summary FINAL      Common labs    Common Labsle 11/4/21 11/4/21 11/4/21    0000 0000 0000   Glucose 99     BUN 14     Creatinine 0.78     eGFR Non  Am 83     eGFR  Am 96     Sodium 141     Potassium 4.8     Chloride 103     Calcium 9.8     Total Protein 7.4     Albumin 4.7     Total Bilirubin 0.3     Alkaline Phosphatase 75     AST (SGOT) 31     ALT (SGPT) 13     WBC   6.8   Hemoglobin   13.3   Hematocrit   41.4   Platelets   434   Total Cholesterol  233 (A)    Triglycerides  114    HDL Cholesterol  79    LDL Cholesterol   134 (A)    (A) Abnormal value       Comments are available for some flowsheets but are not being displayed.           Allergies  Allergies   Allergen Reactions   • Percocet [Oxycodone-Acetaminophen] Nausea Only   • Morphine And Related Rash   • Sulfa Antibiotics Rash      Current Outpatient Medications   Medication Sig Dispense Refill   • venlafaxine 75 MG tablet sustained-release 24 hour 24 hr tablet Take 3 tablets by mouth Daily for 60 days. 90 each 1   • busPIRone (BUSPAR) 10 MG tablet Take 1 tablet by mouth 2 (Two) Times a Day for 60 days. 60 tablet 1   • diclofenac (VOLTAREN) 75 MG EC tablet Take 1 tablet by mouth 2 (Two) Times a Day As Needed (prn). 60 tablet 0   • omeprazole " (priLOSEC) 20 MG capsule take 1 capsule by MOUTH twice daily 60 capsule 3   • traMADol (ULTRAM) 50 MG tablet Take 1 tablet by mouth Every 6 (Six) Hours As Needed for Moderate Pain . 20 tablet 0     No current facility-administered medications for this visit.     Mental Status Exam:   Hygiene:   good  Cooperation:  Cooperative  Eye Contact:  Good  Psychomotor Behavior:  Appropriate  Affect: Appropriate  Hopelessness: Denies  Speech:  Normal  Thought Process:  Goal directed  Thought Content:  Normal  Suicidal:  None  Homicidal:  None  Hallucinations:  None  Delusion:  None  Memory: Intact  Orientation:  Person, Place, Time and Situation  Reliability:  good  Insight:  good  Judgement:  Good  Impulse Control:  Fair  Physical/Medical Issues:  Yes Reviewed    Assessment & Plan     Problem List Items Addressed This Visit        Psychiatry Problems    Generalized anxiety disorder with panic attacks (Chronic)    Current Assessment & Plan     Psychological condition is unchanged.  Medication changes per orders.  Psychological condition  will be reassessed at the next regular appointment.    • Medication: Buspar/buspirone 10 mg twice a day, stop vistaril due to lack of benefit, OK to continue Effexor as previously ordered  • Refills: EFFEXOR XR 75 mg (3) a day (225 mg total) a day sent to [facility] in [place]  • Follow up:  2 months , or sooner if needed  • Monitor for side effects/improvement report to [facility] immediately     • Continue counseling with Janee Childs LCSW  • Education: Please read/review attached documents, reach out with questions/concerns      Increase positive coping skills as tolerated (light/moderate exercise, hobbies, art, music, mediation, yoga, journal, friends/family/pets, etc) to aid in overall health and help reduce stress.           Relevant Medications    busPIRone (BUSPAR) 10 MG tablet    venlafaxine 75 MG tablet sustained-release 24 hour 24 hr tablet    Major depressive disorder, recurrent episode,  moderate (HCC) - Primary (Chronic)    Current Assessment & Plan     Patient's depression is recurrent and is moderate without psychosis. Their depression is currently in partial remission and the condition is improving with treatment. This will be reassessed at the next regular appointment. F/U as described:patient was prescribed an antidepressant medicine.    • Medication: Buspar/buspirone 10 mg twice a day, stop vistaril due to lack of benefit, OK to continue Effexor as previously ordered  • Refills: EFFEXOR XR 75 mg (3) a day (225 mg total) a day sent to [facility] in [place]  • Follow up:  2 months , or sooner if needed  • Monitor for side effects/improvement report to [facility] immediately     • Continue counseling with Janee Childs LCSW  • Education: Please read/review attached documents, reach out with questions/concerns      Increase positive coping skills as tolerated (light/moderate exercise, hobbies, art, music, mediation, yoga, journal, friends/family/pets, etc) to aid in overall health and help reduce stress.           Relevant Medications    busPIRone (BUSPAR) 10 MG tablet    venlafaxine 75 MG tablet sustained-release 24 hour 24 hr tablet         A thorough discussion was had that included review of disease process, need for continued monitoring and additional treatment options including use of pharmacological and non-pharmacological approaches to care, decisions were made and agreed upon by patient and provider. Discussed the risks, benefits, and potential side effects of the medications; patient ackowledged and verbally consented. Patient is advised to avoid driving or operating heavy machinery if they feel drowsy or over sedated. Patient is agreeable to call the office with any worsening of symptoms or onset of intolerable side effects. Patient is agreeable to call 911 or go to the nearest ER should he/she begin having SI/HI.     Barriers:    [x] Co-Occurring Conditions [] Medically Complex [] Financial     [] Transportation Issues [] Low Support  [] Poor compliance with medications/appts   []     Strengths:   [x] Motivated   [] Supportive friends/family [] Knowledge of disease  [] Telma/Mandaen  [] Overall good health  [] Pets    Short-Term Goals: Patient will be compliant with medication management and note improvement in symptoms over the next 4 to 6 weeks or at next scheduled visit.  Long-Term Goals: Patient will continue psychotherapy as well as medication regimen without impairment in daily functioning over the next 6 months.      Progress towards goals:   [] Minor [x] Moderate [] Little to None [x] States improvement  Impairment:    [x] Minor [] Moderate [] Significant  [] Severe     Prognosis:    Good with ongoing treatment    Follow Up   -Patient instructed to keep follow up appointments and notify office if cancellation/reschedule is needed.  -Patient was given instructions and counseling regarding condition being managed and health maintenance advice. Please see specific information pulled into the AVS if appropriate.       ICD-10-CM ICD-9-CM   1. Major depressive disorder, recurrent episode, moderate (HCC)  F33.1 296.32   2. Generalized anxiety disorder with panic attacks  F41.1 300.02    F41.0 300.01      New Medications Ordered This Visit   Medications   • busPIRone (BUSPAR) 10 MG tablet     Sig: Take 1 tablet by mouth 2 (Two) Times a Day for 60 days.     Dispense:  60 tablet     Refill:  1   • venlafaxine 75 MG tablet sustained-release 24 hour 24 hr tablet     Sig: Take 3 tablets by mouth Daily for 60 days.     Dispense:  90 each     Refill:  1       Errors in dictation may reflect use of voice recognition software and not all errors in transcription may have been detected prior to signing. Author states that some information has been carried over from previous notes/encounters, information has been reviewed and updated.

## 2022-05-19 NOTE — PATIENT INSTRUCTIONS
Medication: Buspar/buspirone 10 mg twice a day, stop vistaril due to lack of benefit, OK to continue Effexor as previously ordered  Refills: EFFEXOR XR 75 mg (3) a day (225 mg total) a day sent to [facility] in [place]  Follow up:  2 months , or sooner if needed  Monitor for side effects/improvement report to [facility] immediately     Continue counseling with Janee Childs LCSW  Education: Please read/review attached documents, reach out with questions/concerns      Increase positive coping skills as tolerated (light/moderate exercise, hobbies, art, music, mediation, yoga, journal, friends/family/pets, etc) to aid in overall health and help reduce stress.    [address] [place] [address]  P: [phone]  F: [phone]

## 2022-05-25 ENCOUNTER — OFFICE VISIT (OUTPATIENT)
Dept: ORTHOPEDIC SURGERY | Facility: CLINIC | Age: 62
End: 2022-05-25

## 2022-05-25 VITALS — HEIGHT: 67 IN | TEMPERATURE: 97.9 F | WEIGHT: 146 LBS | BODY MASS INDEX: 22.91 KG/M2

## 2022-05-25 DIAGNOSIS — M17.11 PRIMARY OSTEOARTHRITIS OF RIGHT KNEE: Primary | ICD-10-CM

## 2022-05-25 DIAGNOSIS — S83.281A ACUTE LATERAL MENISCUS TEAR OF RIGHT KNEE, INITIAL ENCOUNTER: ICD-10-CM

## 2022-05-25 PROCEDURE — 20610 DRAIN/INJ JOINT/BURSA W/O US: CPT | Performed by: ORTHOPAEDIC SURGERY

## 2022-05-25 PROCEDURE — 99212 OFFICE O/P EST SF 10 MIN: CPT | Performed by: ORTHOPAEDIC SURGERY

## 2022-05-25 RX ORDER — LIDOCAINE HYDROCHLORIDE 20 MG/ML
4 INJECTION, SOLUTION EPIDURAL; INFILTRATION; INTRACAUDAL; PERINEURAL
Status: COMPLETED | OUTPATIENT
Start: 2022-05-25 | End: 2022-05-25

## 2022-05-25 RX ORDER — METHYLPREDNISOLONE ACETATE 80 MG/ML
80 INJECTION, SUSPENSION INTRA-ARTICULAR; INTRALESIONAL; INTRAMUSCULAR; SOFT TISSUE
Status: COMPLETED | OUTPATIENT
Start: 2022-05-25 | End: 2022-05-25

## 2022-05-25 RX ADMIN — METHYLPREDNISOLONE ACETATE 80 MG: 80 INJECTION, SUSPENSION INTRA-ARTICULAR; INTRALESIONAL; INTRAMUSCULAR; SOFT TISSUE at 10:12

## 2022-05-25 RX ADMIN — LIDOCAINE HYDROCHLORIDE 4 ML: 20 INJECTION, SOLUTION EPIDURAL; INFILTRATION; INTRACAUDAL; PERINEURAL at 10:12

## 2022-05-25 NOTE — PROGRESS NOTES
"Patient: Janis Rosa  YOB: 1960 61 y.o. female  Medical Record Number: [id number]    Chief Complaints:   Chief Complaint   Patient presents with   • Right Knee - Follow-up     F/u MRI results        History of Present Illness:Janis Rosa is a 61 y.o. female who presents for follow-up of right knee pain and to review MRI results.  Patient does have some knee pain mainly laterally based MRI results do show lateral meniscal tear and loss of cartilage particular in the lateral compartment.    Allergies:   Allergies   Allergen Reactions   • Percocet [Oxycodone-Acetaminophen] Nausea Only   • Morphine And Related Rash   • Sulfa Antibiotics Rash       Medications:   Current Outpatient Medications   Medication Sig Dispense Refill   • busPIRone (BUSPAR) 10 MG tablet Take 1 tablet by mouth 2 (Two) Times a Day for 60 days. 60 tablet 1   • diclofenac (VOLTAREN) 75 MG EC tablet Take 1 tablet by mouth 2 (Two) Times a Day As Needed (prn). 60 tablet 0   • omeprazole (priLOSEC) 20 MG capsule take 1 capsule by MOUTH twice daily 60 capsule 3   • traMADol (ULTRAM) 50 MG tablet Take 1 tablet by mouth Every 6 (Six) Hours As Needed for Moderate Pain . 20 tablet 0   • venlafaxine 75 MG tablet sustained-release 24 hour 24 hr tablet Take 3 tablets by mouth Daily for 60 days. 90 each 1     No current facility-administered medications for this visit.         The following portions of the patient's history were reviewed and updated as appropriate: allergies, current medications, past family history, past medical history, past social history, past surgical history and problem list.    Review of Systems:   A 14 point review of systems was performed. All systems negative except pertinent positives/negative listed in HPI above    Physical Exam:   Vitals:    05/25/22 0937   Temp: 97.9 °F (36.6 °C)   Weight: 66.2 kg (146 lb)   Height: 170.2 cm (67\")       General: A and O x 3, ASA, NAD    SCLERA:    Normal    DENTITION:   " Normal  Exam unchanged    Assessment/Plan:  Right knee osteoarthritis and lateral meniscal tear    Plan injection and send to formal therapy.  Not continue to improve she may call we can reevaluate.      Large Joint Arthrocentesis: R knee  Date/Time: 5/25/2022 10:12 AM  Consent given by: patient  Site marked: site marked  Timeout: Immediately prior to procedure a time out was called to verify the correct patient, procedure, equipment, support staff and site/side marked as required   Supporting Documentation  Indications: pain, joint swelling and diagnostic evaluation   Procedure Details  Location: knee - R knee  Preparation: Patient was prepped and draped in the usual sterile fashion  Needle gauge: 21G.  Approach: anterolateral  Medications administered: 80 mg methylPREDNISolone acetate 80 MG/ML; 4 mL lidocaine PF 2% 2 %  Patient tolerance: patient tolerated the procedure well with no immediate complications

## 2022-05-26 ENCOUNTER — TELEPHONE (OUTPATIENT)
Dept: ORTHOPEDIC SURGERY | Facility: CLINIC | Age: 62
End: 2022-05-26

## 2022-05-26 NOTE — TELEPHONE ENCOUNTER
Provider: FIDEL RUTLEDGE    Caller: LESLYE GROVE  Relationship to Patient: SELF    Phone Number: [phone]    Pharmacy: [facility] [address]: [phone]      Reason for Call: PATIENT RECEIVED INJECTION ON 05/25/2022 IN THE MORNING- STATES ICING THE KNEE SEEMS TO CAUSE MORE PAIN- WOULD LIKE TO KNOW IF APPLYING HEAT IS OK INSTEAD OF OR IN ADDITION TO- STATES NO SWELLING, HOWEVER IS A LITTLE WARM TO THE TOUCH    When was the patient last seen: 05/25/2022

## 2022-05-26 NOTE — TELEPHONE ENCOUNTER
Patient informed of TJS Prior message verbalizing understanding and with no further questions or concerns.

## 2022-06-06 RX ORDER — HYDROXYZINE PAMOATE 25 MG/1
CAPSULE ORAL
Qty: 30 CAPSULE | Refills: 1 | OUTPATIENT
Start: 2022-06-06

## 2022-06-07 ENCOUNTER — APPOINTMENT (OUTPATIENT)
Dept: MRI IMAGING | Facility: HOSPITAL | Age: 62
End: 2022-06-07

## 2022-06-07 RX ORDER — FLUCONAZOLE 150 MG/1
TABLET ORAL
Qty: 1 TABLET | Refills: 1 | Status: SHIPPED | OUTPATIENT
Start: 2022-06-07 | End: 2022-11-29

## 2022-06-10 ENCOUNTER — OFFICE VISIT (OUTPATIENT)
Dept: BEHAVIORAL HEALTH | Facility: CLINIC | Age: 62
End: 2022-06-10

## 2022-06-10 DIAGNOSIS — F41.1 GENERALIZED ANXIETY DISORDER WITH PANIC ATTACKS: Chronic | ICD-10-CM

## 2022-06-10 DIAGNOSIS — F33.1 MAJOR DEPRESSIVE DISORDER, RECURRENT EPISODE, MODERATE: Primary | Chronic | ICD-10-CM

## 2022-06-10 DIAGNOSIS — F41.0 GENERALIZED ANXIETY DISORDER WITH PANIC ATTACKS: Chronic | ICD-10-CM

## 2022-06-10 PROCEDURE — 90834 PSYTX W PT 45 MINUTES: CPT

## 2022-06-10 NOTE — PROGRESS NOTES
Date of Service: Jessica 10, 2022  Time In: 11:00 AM  Time Out: 11:44 AM    IDENTIFYING  INFORMATION:   Janis Rosa is a 61 y.o. female who met 1:1 with the undersigned for a regularly scheduled individual outpatient therapy session at [address] [place] [address].     HPI: History of Present Illness:Reports a long long history beginning in childhood of depression, anxiety, and trauma.      Clinical Maneuvering/Intervention:     The main therapeutic interventions consisted of: Cognitive behavioral therapy. Specifically focusing on: Current external stressors and individual treatment goals in addressing     ICD-10-CM ICD-9-CM   1. Major depressive disorder, recurrent episode, moderate (HCC)  F33.1 296.32   2. Generalized anxiety disorder with panic attacks  F41.1 300.02    F41.0 300.01   . Facilitating therapist was able to assist patient in rational decision making regarding marital stress and relationship, processing emotional response to external stressors, and consider appropriate tools and coping skills to implement thus far in addressing depression, anxiety, trauma and avoid decompensation and the need for higher level of care.     Allowed patient to freely discuss issues without interruption or judgment. Provided safe, confidential environment to facilitate the development and maintenance of positive therapeutic relationship. Assisted patient in identifying increased risk factors which would indicate the need for higher level of care including thoughts to harm self or others and/or self-harming behavior and encouraged patient to contact this office, call 911, or present to nearest emergency room should either event occur. Discussed crisis intervention services and means to access.        Assessment:     DIAGNOSIS:   Assessment & Plan   Diagnoses and all orders for this visit:    1. Major depressive disorder, recurrent episode, moderate (HCC) (Primary)    2. Generalized anxiety disorder with panic  attacks          Mental Status Exam:   Hygiene:   good  Cooperation:  Cooperative  Eye Contact:  Fair  Psychomotor Behavior:  Appropriate  Affect:  Appropriate  Speech:  Normal  Thought Progress:  Goal directed  Thought Content:  Mood congruent  Suicidal:  None  Homicidal:  None  Hallucinations:  None  Delusion:  None  Memory:  Intact  Orientation:  Grossly intact  Reliability:  fair  Insight:  Fair  Judgement:  Impaired  Impulse Control:  Impaired        Patient's Support Network Includes:   Patient reports her , family, friends     Progress toward goal: Developments continue in the area of developing coping skills. The ongoing treatment plan includes therapeutic work on anxiety and depression management. Current outstanding therapeutic issues are: Unresolved trauma. Treatment to continue as indicated.     Functional Status: Moderate impairment      Prognosis: Good with Ongoing Treatment         Plan      Return in 2 weeks (on 6/24/2022) for Next scheduled follow up.    Patient will continue in individual outpatient therapy session [facility] every 2 weeks and pharmacotherapy as scheduled.  Patient will adhere to medication regimen as prescribed and report any side effects. Patient will contact this office, call 911 or present to the nearest emergency room should suicidal or homicidal ideations occur.        This document signed by Janee Childs LCSW electronically Janee Childs LCSW  Jessica 10, 2022 12:26 EDT      Part of this note may be an electronic transcription/translation of spoken language to printed text using the Dragon Dictation System.

## 2022-06-24 ENCOUNTER — TREATMENT (OUTPATIENT)
Dept: PHYSICAL THERAPY | Facility: CLINIC | Age: 62
End: 2022-06-24

## 2022-06-24 DIAGNOSIS — M17.11 PRIMARY OSTEOARTHRITIS OF RIGHT KNEE: ICD-10-CM

## 2022-06-24 DIAGNOSIS — S83.281A ACUTE LATERAL MENISCUS TEAR OF RIGHT KNEE, INITIAL ENCOUNTER: Primary | ICD-10-CM

## 2022-06-24 PROCEDURE — 97161 PT EVAL LOW COMPLEX 20 MIN: CPT | Performed by: PHYSICAL THERAPIST

## 2022-06-24 PROCEDURE — 97110 THERAPEUTIC EXERCISES: CPT | Performed by: PHYSICAL THERAPIST

## 2022-06-24 NOTE — PROGRESS NOTES
Physical Therapy Initial Evaluation and Plan of Care      Patient: Janis Rosa   : 1960  Diagnosis/ICD-10 Code:  Primary osteoarthritis of right knee [M17.11]  Referring practitioner: Bartolo Young MD  Date of Initial Visit: 2022  Today's Date: 2022  Patient seen for 1 sessions         Visit Diagnoses:    ICD-10-CM ICD-9-CM   1. Primary osteoarthritis of right knee  M17.11 715.16   2. Acute lateral meniscus tear of right knee, initial encounter  S83.281A 836.1       Subjective Evaluation    History of Present Illness  Mechanism of injury: Janis notes that it has been about two months, maybe longer, that her R knee was bothering her.  The R leg has always been weak due to the transverse myelitis (about 9 yrs ago).      She went bowling a few months ago, as soon as she stopped real sudden, she felt a burn in her R knee to butt.  She finished bowling.  She follow edup with PCP, xrays were taken - showed nothing pt stated, then she was referred to ortho.  She didn't have an injection the first time with ortho , but scheudled an MRI.   She had a tear in her meniscus and small cyst behind her knee.  The tear was small enough that she didn't need surgery, but could use therapy. She was supposed to limit her sports until starting PT.    She has only gone bowling one other time, wore her brace, no trouble.  She had an injection in her knee - everything felt fine. Referred for PT    She was working in the garden, felt something in the knee.  She is having more pain at the center, front of the knee, close to where the injection was.  The pain was on the lateral part of the knee.  She isn't having to wear the brace like she was.     PMH: transverse myelitis, lateral mensicus tear    Pain  At worst pain ratin  Quality: dull ache  Relieving factors: heat  Aggravating factors: stairs and repetitive movement (kneeling)    Social Support  Lives in: multiple-level home  Lives with:  spouse    Treatments  Current treatment: injection treatment  Patient Goals  Patient goals for therapy: decreased pain, increased motion, improved balance, increased strength, independence with ADLs/IADLs and return to sport/leisure activities             Objective           General Comments     Knee Comments  R knee AROM  Flexion: 125 degrees  Extension: 0 degrees    R knee MMT  Flexion: 4-/5  Extension: 4-/5    R hip MMT  Flexion: 4/5  Extension: 4-/5  Abduction: 4/5  Adduction: 4/5      WB Status: WBAT and patient is not using an assistive device.  She did wear her brace with bowling.     Swelling: minimal noted, non pitting    Radicular Symptoms: none noted (some will travel to the foot, but she notes that was from the transverse myeletis)         Assessment & Plan     Assessment  Impairments: abnormal gait, abnormal or restricted ROM, activity intolerance, impaired balance, impaired physical strength, lacks appropriate home exercise program, pain with function and safety issue  Functional Limitations: sleeping, walking, uncomfortable because of pain, standing and stooping  Assessment details: Pt presents with limitations, noted below, that impede her ability to walk long distances, stair navigation, perform hobbies/recreational activities, and doing activities for long periods of time. The skills of a therapist will be required to safely and effectively implement the following treatment plan to restore maximal level of function.       Goals  Plan Goals: KNEE PROBLEMS:     1. Mobility: Walking/Moving Around Functional Limitation     LTG 4: 12 weeks:  The patient will demonstrate limitation by achieving a score of 65/80 on the LEFS.    STATUS:  New   STG 4 a: 4 weeks:  The patient will demonstrate limitation by achieving a score of 55/80 on the LEFS.      STATUS:  New   TREATMENT:  Manual therapy, therapeutic exercise, home exercise instruction, and modalities as needed.    2. The patient has limited strength of  the right knee.   LTG 2: 12 weeks: The patient will demonstrate 4+ /5 strength for right knee flexion and extension in order to allow patient improved joint stability    STATUS:  New   STG 2a: 4 weeks: The patient will demonstrate 4 /5 strength for right knee flexion and extension    STATUS:  New   STG2b:  4 weeks:  The patient will be independent with home exercises.     STATUS:  New   TREATMENT: Manual therapy, therapeutic exercise, home exercise instruction, aquatic therapy, and modalities as needed to include:  moist heat, electrical stimulation, ultrasound, and ice.     3. The patient has gait dysfunction.   LTG 3: 12 weeks:  The patient will ambulate without assistive device, independently, for community distances with minimal limp to the right lower extremity in order to improve mobility and allow patient to perform activities such as grocery shopping with greater ease.    STATUS:  New   TREATMENT: Gait training, aquatic therapy, therapeutic exercise, and home exercise instruction.          Plan  Therapy options: will be seen for skilled therapy services  Planned modality interventions: cryotherapy, dry needling, TENS, thermotherapy (hydrocollator packs), ultrasound and electrical stimulation/Russian stimulation  Planned therapy interventions: manual therapy, balance/weight-bearing training, ADL retraining, flexibility, functional ROM exercises, gait training, home exercise program, joint mobilization, neuromuscular re-education, postural training, soft tissue mobilization, strengthening, stretching and therapeutic activities  Frequency: 2x week (start 2x progress to 1x as tolerated)  Duration in weeks: 12  Treatment plan discussed with: patient  Plan details: Review HEP, update as needed.    Progress with RLE strength, balance and gait training, coordination, increased stamina, decreased tightness, improved ROM/flexibility, education as needed.            History # of Personal Factors and/or Comorbidities:  MODERATE (1-2)  Examination of Body System(s): # of elements: MODERATE (3)  Clinical Presentation: STABLE   Clinical Decision Making: LOW     Timed:  Manual Therapy:         mins  49805;  Therapeutic Exercise:    10     mins  69301;     Neuromuscular Karla:        mins  82344;    Therapeutic Activity:          mins  56284;     Gait Training:           mins  83285;     Ultrasound:          mins  15897;    Canalith Repos           ___  mins  16378      Untimed:  Electrical Stimulation:         mins  38357 (MC );  Mechanical Traction:         mins  65640;   Dry Needling:                     mins self pay  Low Eval:                      30     mins  71447;  Medium Eval:                     mins  54206;   High Eval:                          mins  45509       Timed Treatment:  10   mins   Total Treatment:     44   mins    PT SIGNATURE: Janee Solis PT, DPT  [place] License: [id number]  Electronically signed by Janee Solis PT, 06/24/22, 11:04 AM EDT      Initial Certification    Certification Period: 6/24/2022 thru 9/21/2022  I certify that the therapy services are furnished while this patient is under my care.  The services outlined above are required by this patient, and will be reviewed every 90 days.     PHYSICIAN: Bartolo Young MD  NPI: [id number]            PHYSICIAN PRINT NAME: ______________________________________________      PHYSICIAN SIGNATURE: ______________________________________________         DATE:________________________________        Please sign and return via fax to [phone].  Thank you, [facility].

## 2022-06-27 ENCOUNTER — TREATMENT (OUTPATIENT)
Dept: PHYSICAL THERAPY | Facility: CLINIC | Age: 62
End: 2022-06-27

## 2022-06-27 DIAGNOSIS — M17.11 PRIMARY OSTEOARTHRITIS OF RIGHT KNEE: Primary | ICD-10-CM

## 2022-06-27 DIAGNOSIS — S83.281A ACUTE LATERAL MENISCUS TEAR OF RIGHT KNEE, INITIAL ENCOUNTER: ICD-10-CM

## 2022-06-27 PROCEDURE — 97110 THERAPEUTIC EXERCISES: CPT | Performed by: PHYSICAL THERAPIST

## 2022-06-27 PROCEDURE — 97112 NEUROMUSCULAR REEDUCATION: CPT | Performed by: PHYSICAL THERAPIST

## 2022-06-27 PROCEDURE — 97530 THERAPEUTIC ACTIVITIES: CPT | Performed by: PHYSICAL THERAPIST

## 2022-06-27 NOTE — PROGRESS NOTES
Physical Therapy Daily Treatment Note      Patient: Janis Rosa   : 1960  Referring practitioner: Bartolo Young MD  Date of Initial Visit: Type: THERAPY  Noted: 2022  Today's Date: 2022  Patient seen for 2 sessions           Visit Diagnoses:    ICD-10-CM ICD-9-CM   1. Primary osteoarthritis of right knee  M17.11 715.16   2. Acute lateral meniscus tear of right knee, initial encounter  S83.281A 836.1       Subjective Evaluation    History of Present Illness    Subjective comment: No pain today, she has been able to do her HEP.          Objective           General Comments     Knee Comments  Crepitus/popping noted during the workouts     See Exercise, Manual, and Modality Logs for complete treatment.       Assessment & Plan     Assessment  Impairments: abnormal gait, abnormal or restricted ROM, activity intolerance, impaired balance, impaired physical strength, lacks appropriate home exercise program, pain with function and safety issue    Assessment details: Added in strengthening for the RLE, pt able to tolerate.  Updated and reviewed HEP. Added in some resistance with activities as tolerated.     If pt was able to tolerate today's session and previous HEP, please update new HEP for her on next visit.     Goals  Plan Goals: KNEE PROBLEMS:     1. Mobility: Walking/Moving Around Functional Limitation     LTG 4: 12 weeks:  The patient will demonstrate limitation by achieving a score of 65/80 on the LEFS.    STATUS:  Progressing   STG 4 a: 4 weeks:  The patient will demonstrate limitation by achieving a score of 55/80 on the LEFS.      STATUS:  Progressing   TREATMENT:  Manual therapy, therapeutic exercise, home exercise instruction, and modalities as needed.    2. The patient has limited strength of the right knee.   LTG 2: 12 weeks: The patient will demonstrate 4+ /5 strength for right knee flexion and extension in order to allow patient improved joint stability    STATUS:  Progressing   STG 2a:  4 weeks: The patient will demonstrate 4 /5 strength for right knee flexion and extension    STATUS:  Progressing  STG2b:  4 weeks:  The patient will be independent with home exercises.     STATUS:  Progressing   TREATMENT: Manual therapy, therapeutic exercise, home exercise instruction, aquatic therapy, and modalities as needed to include:  moist heat, electrical stimulation, ultrasound, and ice.     3. The patient has gait dysfunction.   LTG 3: 12 weeks:  The patient will ambulate without assistive device, independently, for community distances with minimal limp to the right lower extremity in order to improve mobility and allow patient to perform activities such as grocery shopping with greater ease.    STATUS:  Progressing   TREATMENT: Gait training, aquatic therapy, therapeutic exercise, and home exercise instruction.          Plan  Therapy options: will be seen for skilled therapy services  Planned modality interventions: cryotherapy, dry needling, TENS, thermotherapy (hydrocollator packs), ultrasound and electrical stimulation/Russian stimulation  Planned therapy interventions: manual therapy, balance/weight-bearing training, ADL retraining, flexibility, functional ROM exercises, gait training, home exercise program, joint mobilization, neuromuscular re-education, postural training, soft tissue mobilization, strengthening, stretching and therapeutic activities  Frequency: 2x week (start 2x progress to 1x as tolerated)  Duration in weeks: 12  Treatment plan discussed with: patient  Plan details: Update HEP, printout, rogress with R knee and quad control, LE strength, balance and gait training,           Progress per Plan of Care and Progress strengthening /stabilization /functional activity           Timed:  Manual Therapy:         mins  98654;  Therapeutic Exercise:    12     mins  61331;     Neuromuscular Karla:    8    mins  50935;    Therapeutic Activity:     10     mins  70898;     Gait Training:            mins  05499;     Ultrasound:          mins  00531;    Canalith Repos           ___  mins  05551      Untimed:  Electrical Stimulation:         mins  55531 ( );  Mechanical Traction:         mins  00551;   Dry Needling:                     mins self pay       Timed Treatment:   30   mins   Total Treatment:     34   mins      Janee Solis PT, DPT  [place] License #: [id number]

## 2022-07-02 DIAGNOSIS — F41.9 ANXIETY: ICD-10-CM

## 2022-07-05 RX ORDER — ALPRAZOLAM 0.5 MG/1
TABLET ORAL
Qty: 90 TABLET | Refills: 1 | Status: SHIPPED | OUTPATIENT
Start: 2022-07-05 | End: 2022-10-11

## 2022-07-05 NOTE — TELEPHONE ENCOUNTER
Next ov none    Last ov 05/05/22    Last lab 11/04/21    Last UDS  02/28/22    Last contract 02/01/21

## 2022-07-13 ENCOUNTER — OFFICE VISIT (OUTPATIENT)
Dept: BEHAVIORAL HEALTH | Facility: CLINIC | Age: 62
End: 2022-07-13

## 2022-07-13 DIAGNOSIS — F41.1 GENERALIZED ANXIETY DISORDER WITH PANIC ATTACKS: Primary | Chronic | ICD-10-CM

## 2022-07-13 DIAGNOSIS — F41.0 GENERALIZED ANXIETY DISORDER WITH PANIC ATTACKS: Primary | Chronic | ICD-10-CM

## 2022-07-13 DIAGNOSIS — F33.0 MILD EPISODE OF RECURRENT MAJOR DEPRESSIVE DISORDER: ICD-10-CM

## 2022-07-13 PROCEDURE — 90834 PSYTX W PT 45 MINUTES: CPT

## 2022-07-14 NOTE — PROGRESS NOTES
Date of Service: July 13, 2022  Time In: 2:00 PM  Time Out: 2:48 PM    IDENTIFYING  INFORMATION:   Janis Rosa is a 61 y.o. female who met 1:1 with the undersigned for a regularly scheduled individual outpatient therapy session at [address] [place] [address].     HPI: History of Present Illness:Reports a long long history beginning in childhood of depression, anxiety, and trauma.      Clinical Maneuvering/Intervention:     The main therapeutic interventions consisted of: Cognitive behavioral therapy. Specifically focusing on: Current behavior and perspective changes and individual treatment goals in addressing     ICD-10-CM ICD-9-CM   1. Generalized anxiety disorder with panic attacks  F41.1 300.02    F41.0 300.01   2. Mild episode of recurrent major depressive disorder (HCC)  F33.0 296.31   . Facilitating therapist was able to assist patient in rational decision making regarding personal life and relationship, processing behavioral and thought response to current issues, and consider appropriate tools and coping skills to implement thus far in addressing depression, anxiety and avoid decompensation and the need for higher level of care.     Allowed patient to freely discuss issues without interruption or judgment. Provided safe, confidential environment to facilitate the development and maintenance of positive therapeutic relationship. Assisted patient in identifying increased risk factors which would indicate the need for higher level of care including thoughts to harm self or others and/or self-harming behavior and encouraged patient to contact this office, call 911, or present to nearest emergency room should either event occur. Discussed crisis intervention services and means to access.        Assessment:     DIAGNOSIS:   Assessment & Plan   Diagnoses and all orders for this visit:    1. Generalized anxiety disorder with panic attacks (Primary)    2. Mild episode of recurrent major depressive  disorder (HCC)          Mental Status Exam:   Hygiene:   good  Cooperation:  Cooperative  Eye Contact:  Fair  Psychomotor Behavior:  Appropriate  Affect:  Appropriate  Speech:  Normal  Thought Progress:  Goal directed  Thought Content:  Mood congruent  Suicidal:  None  Homicidal:  None  Hallucinations:  None  Delusion:  None  Memory:  Intact  Orientation:  Grossly intact  Reliability:  fair  Insight:  Fair  Judgement:  Good  Impulse Control:  Good        Patient's Support Network Includes:   Patient reports her family (3 oldest children and brother), friends, old coworkers, etc.     Progress toward goal: Developments continue in the area of utilizing healthy coping skills. The ongoing treatment plan includes therapeutic work on anxiety and depression management. Current outstanding therapeutic issues are: decision making and action plan execution. Treatment to continue as indicated.     Functional Status: Moderate impairment      Prognosis: Good with Ongoing Treatment         Plan      Return in 3 weeks (on 8/3/2022) for Next scheduled follow up.    Patient will continue in individual outpatient therapy session [facility] every 2 weeks and pharmacotherapy as scheduled.  Patient will adhere to medication regimen as prescribed and report any side effects. Patient will contact this office, call 911 or present to the nearest emergency room should suicidal or homicidal ideations occur.        This document signed by Janee Childs LCSW electronically Janee Childs LCSW  July 14, 2022 09:19 EDT      Part of this note may be an electronic transcription/translation of spoken language to printed text using the Dragon Dictation System.

## 2022-07-22 RX ORDER — BUSPIRONE HYDROCHLORIDE 10 MG/1
10 TABLET ORAL 2 TIMES DAILY
Qty: 60 TABLET | Refills: 0 | Status: SHIPPED | OUTPATIENT
Start: 2022-07-22 | End: 2023-02-27

## 2022-07-25 ENCOUNTER — HOSPITAL ENCOUNTER (OUTPATIENT)
Dept: CT IMAGING | Facility: HOSPITAL | Age: 62
Discharge: HOME | End: 2022-07-25
Admitting: NURSE PRACTITIONER

## 2022-07-25 ENCOUNTER — OFFICE VISIT (OUTPATIENT)
Dept: FAMILY MEDICINE CLINIC | Facility: CLINIC | Age: 62
End: 2022-07-25

## 2022-07-25 VITALS
OXYGEN SATURATION: 97 % | TEMPERATURE: 97.1 F | DIASTOLIC BLOOD PRESSURE: 66 MMHG | WEIGHT: 144.2 LBS | HEART RATE: 105 BPM | SYSTOLIC BLOOD PRESSURE: 124 MMHG | BODY MASS INDEX: 23.18 KG/M2 | HEIGHT: 66 IN

## 2022-07-25 DIAGNOSIS — M25.461 PAIN AND SWELLING OF KNEE, RIGHT: ICD-10-CM

## 2022-07-25 DIAGNOSIS — M25.561 PAIN AND SWELLING OF KNEE, RIGHT: ICD-10-CM

## 2022-07-25 DIAGNOSIS — R10.9 RT FLANK PAIN: ICD-10-CM

## 2022-07-25 DIAGNOSIS — R31.9 HEMATURIA, UNSPECIFIED TYPE: Primary | ICD-10-CM

## 2022-07-25 DIAGNOSIS — R35.0 URINE FREQUENCY: ICD-10-CM

## 2022-07-25 LAB
BILIRUB BLD-MCNC: ABNORMAL MG/DL
CLARITY, POC: ABNORMAL
COLOR UR: ABNORMAL
EXPIRATION DATE: ABNORMAL
GLUCOSE UR STRIP-MCNC: NEGATIVE MG/DL
KETONES UR QL: NEGATIVE
LEUKOCYTE EST, POC: ABNORMAL
Lab: ABNORMAL
NITRITE UR-MCNC: NEGATIVE MG/ML
PH UR: 6.5 [PH] (ref 5–8)
PROT UR STRIP-MCNC: ABNORMAL MG/DL
RBC # UR STRIP: ABNORMAL /UL
SP GR UR: 1.02 (ref 1–1.03)
UROBILINOGEN UR QL: NORMAL

## 2022-07-25 PROCEDURE — 99214 OFFICE O/P EST MOD 30 MIN: CPT | Performed by: NURSE PRACTITIONER

## 2022-07-25 PROCEDURE — 74176 CT ABD & PELVIS W/O CONTRAST: CPT

## 2022-07-25 PROCEDURE — 81003 URINALYSIS AUTO W/O SCOPE: CPT | Performed by: NURSE PRACTITIONER

## 2022-07-25 RX ORDER — PHENAZOPYRIDINE HYDROCHLORIDE 200 MG/1
200 TABLET, FILM COATED ORAL 3 TIMES DAILY PRN
Qty: 30 TABLET | Refills: 0 | Status: SHIPPED | OUTPATIENT
Start: 2022-07-25 | End: 2022-11-29

## 2022-07-25 RX ORDER — TRAMADOL HYDROCHLORIDE 50 MG/1
50 TABLET ORAL EVERY 6 HOURS PRN
Qty: 20 TABLET | Refills: 0 | Status: SHIPPED | OUTPATIENT
Start: 2022-07-25 | End: 2022-11-21 | Stop reason: SDUPTHER

## 2022-07-25 RX ORDER — CEFDINIR 300 MG/1
300 CAPSULE ORAL 2 TIMES DAILY
Qty: 14 CAPSULE | Refills: 0 | Status: SHIPPED | OUTPATIENT
Start: 2022-07-25 | End: 2022-09-15

## 2022-07-25 RX ORDER — VENLAFAXINE HYDROCHLORIDE 75 MG/1
225 CAPSULE, EXTENDED RELEASE ORAL DAILY
COMMUNITY
Start: 2022-06-27 | End: 2022-08-26 | Stop reason: SDUPTHER

## 2022-07-25 NOTE — PROGRESS NOTES
"Chief Complaint  Urinary Tract Infection, Blood in Urine, and Urinary Frequency    Subjective        Janis Rosa presents to [facility]  History of Present Illness  This is a 62 yo female patient here today for evaluation of subrapubic pain, back pain and hematuria. Patient reports symptoms started yesterday. She has frequenct and hematuria. She does have a history of kidney stones. She had amoxilcillin and started taking with no relief. She denies nausea or vomiting. No fever today. She reports pain at 8 today    Objective   Vital Signs:  /66 (BP Location: Left arm, Patient Position: Sitting, Cuff Size: Adult)   Pulse 105   Temp 97.1 °F (36.2 °C) (Infrared)   Ht 168.4 cm (66.3\")   Wt 65.4 kg (144 lb 3.2 oz)   SpO2 97%   BMI 23.06 kg/m²   Estimated body mass index is 23.06 kg/m² as calculated from the following:    Height as of this encounter: 168.4 cm (66.3\").    Weight as of this encounter: 65.4 kg (144 lb 3.2 oz).    BMI is within normal parameters. No other follow-up for BMI required.      Physical Exam  Vitals and nursing note reviewed.   HENT:      Head: Normocephalic.      Nose: Nose normal.   Eyes:      Pupils: Pupils are equal, round, and reactive to light.   Cardiovascular:      Rate and Rhythm: Normal rate and regular rhythm.      Pulses: Normal pulses.      Heart sounds: Normal heart sounds.   Pulmonary:      Effort: Pulmonary effort is normal. No respiratory distress.      Breath sounds: Normal breath sounds. No wheezing or rales.   Abdominal:      General: Bowel sounds are normal. There is no distension.      Tenderness: There is abdominal tenderness. There is right CVA tenderness. There is no left CVA tenderness.   Musculoskeletal:         General: No swelling.      Cervical back: Neck supple.      Right lower leg: No edema.      Left lower leg: No edema.   Skin:     General: Skin is warm and dry.   Neurological:      Mental Status: She is alert and " oriented to person, place, and time.   Psychiatric:         Mood and Affect: Mood normal.        Result Review :                Assessment and Plan   Diagnoses and all orders for this visit:    1. Hematuria, unspecified type (Primary)  -     Cancel: CT Abdomen Pelvis Stone Protocol  -     POCT urinalysis dipstick, automated  -     CT Abdomen Pelvis Stone Protocol    2. Rt flank pain  -     Cancel: CT Abdomen Pelvis Stone Protocol  -     CT Abdomen Pelvis Stone Protocol    3. Urine frequency  -     POCT urinalysis dipstick, automated    4. Pain and swelling of knee, right  -     traMADol (ULTRAM) 50 MG tablet; Take 1 tablet by mouth Every 6 (Six) Hours As Needed for Moderate Pain .  Dispense: 20 tablet; Refill: 0    Other orders  -     phenazopyridine (Pyridium) 200 MG tablet; Take 1 tablet by mouth 3 (Three) Times a Day As Needed for Bladder Spasms.  Dispense: 30 tablet; Refill: 0  -     cefdinir (OMNICEF) 300 MG capsule; Take 1 capsule by mouth 2 (Two) Times a Day.  Dispense: 14 capsule; Refill: 0      Urine does show blood  I will give her a small amt of tramdol, kaspar was reviewed and appropriate.   I will give her omnicef and send urine for culture.   I will order CT scan for further eval  Patient was instructed to report to ER if symptoms worsen.    I spent a total of 30 minutes with the patient today including face-to-face encounter, reviewing data in the system, coordination of care with the nursing staff as well as consultants, documentation and entering orders.           Follow Up   No follow-ups on file.  Patient was given instructions and counseling regarding her condition or for health maintenance advice. Please see specific information pulled into the AVS if appropriate.

## 2022-08-03 ENCOUNTER — APPOINTMENT (OUTPATIENT)
Dept: CT IMAGING | Facility: HOSPITAL | Age: 62
End: 2022-08-03

## 2022-08-03 ENCOUNTER — TELEPHONE (OUTPATIENT)
Dept: FAMILY MEDICINE CLINIC | Facility: CLINIC | Age: 62
End: 2022-08-03

## 2022-08-03 ENCOUNTER — HOSPITAL ENCOUNTER (EMERGENCY)
Facility: HOSPITAL | Age: 62
Discharge: HOME | End: 2022-08-03
Attending: EMERGENCY MEDICINE | Admitting: EMERGENCY MEDICINE

## 2022-08-03 VITALS
RESPIRATION RATE: 18 BRPM | OXYGEN SATURATION: 97 % | TEMPERATURE: 98.9 F | HEART RATE: 98 BPM | DIASTOLIC BLOOD PRESSURE: 77 MMHG | SYSTOLIC BLOOD PRESSURE: 116 MMHG

## 2022-08-03 DIAGNOSIS — A04.72 CLOSTRIDIUM DIFFICILE COLITIS: Primary | ICD-10-CM

## 2022-08-03 DIAGNOSIS — K76.9 LIVER LESION: ICD-10-CM

## 2022-08-03 LAB
ALBUMIN SERPL-MCNC: 4.5 G/DL (ref 3.5–5.2)
ALBUMIN/GLOB SERPL: 1.6 G/DL
ALP SERPL-CCNC: 91 U/L (ref 39–117)
ALT SERPL W P-5'-P-CCNC: 13 U/L (ref 1–33)
ANION GAP SERPL CALCULATED.3IONS-SCNC: 11.6 MMOL/L (ref 5–15)
AST SERPL-CCNC: 20 U/L (ref 1–32)
BACTERIA UR QL AUTO: ABNORMAL /HPF
BASOPHILS # BLD AUTO: 0.02 10*3/MM3 (ref 0–0.2)
BASOPHILS NFR BLD AUTO: 0.2 % (ref 0–1.5)
BILIRUB SERPL-MCNC: 0.4 MG/DL (ref 0–1.2)
BILIRUB UR QL STRIP: NEGATIVE
BUN SERPL-MCNC: 5 MG/DL (ref 6–20)
BUN/CREAT SERPL: 6.6 (ref 7–25)
C DIFF GDH + TOXINS A+B STL QL IA.RAPID: POSITIVE
C DIFF TOX GENS STL QL NAA+PROBE: POSITIVE
CALCIUM SPEC-SCNC: 9.8 MG/DL (ref 8.6–10.5)
CHLORIDE SERPL-SCNC: 101 MMOL/L (ref 98–107)
CLARITY UR: CLEAR
CO2 SERPL-SCNC: 26.4 MMOL/L (ref 22–29)
COLOR UR: YELLOW
CREAT SERPL-MCNC: 0.76 MG/DL (ref 0.57–1)
D-LACTATE SERPL-SCNC: 1.5 MMOL/L (ref 0.5–2)
DEPRECATED RDW RBC AUTO: 40.1 FL (ref 37–54)
EGFRCR SERPLBLD CKD-EPI 2021: 90.4 ML/MIN/1.73
EOSINOPHIL # BLD AUTO: 0.11 10*3/MM3 (ref 0–0.4)
EOSINOPHIL NFR BLD AUTO: 1.1 % (ref 0.3–6.2)
ERYTHROCYTE [DISTWIDTH] IN BLOOD BY AUTOMATED COUNT: 12.4 % (ref 12.3–15.4)
GLOBULIN UR ELPH-MCNC: 2.9 GM/DL
GLUCOSE SERPL-MCNC: 105 MG/DL (ref 65–99)
GLUCOSE UR STRIP-MCNC: NEGATIVE MG/DL
HCT VFR BLD AUTO: 42.1 % (ref 34–46.6)
HGB BLD-MCNC: 14 G/DL (ref 12–15.9)
HGB UR QL STRIP.AUTO: ABNORMAL
HOLD SPECIMEN: NORMAL
HOLD SPECIMEN: NORMAL
HYALINE CASTS UR QL AUTO: ABNORMAL /LPF
IMM GRANULOCYTES # BLD AUTO: 0.03 10*3/MM3 (ref 0–0.05)
IMM GRANULOCYTES NFR BLD AUTO: 0.3 % (ref 0–0.5)
KETONES UR QL STRIP: ABNORMAL
LEUKOCYTE ESTERASE UR QL STRIP.AUTO: ABNORMAL
LIPASE SERPL-CCNC: 20 U/L (ref 13–60)
LYMPHOCYTES # BLD AUTO: 2.12 10*3/MM3 (ref 0.7–3.1)
LYMPHOCYTES NFR BLD AUTO: 21.9 % (ref 19.6–45.3)
MCH RBC QN AUTO: 29.5 PG (ref 26.6–33)
MCHC RBC AUTO-ENTMCNC: 33.3 G/DL (ref 31.5–35.7)
MCV RBC AUTO: 88.6 FL (ref 79–97)
MONOCYTES # BLD AUTO: 1.32 10*3/MM3 (ref 0.1–0.9)
MONOCYTES NFR BLD AUTO: 13.7 % (ref 5–12)
NEUTROPHILS NFR BLD AUTO: 6.06 10*3/MM3 (ref 1.7–7)
NEUTROPHILS NFR BLD AUTO: 62.8 % (ref 42.7–76)
NITRITE UR QL STRIP: NEGATIVE
NRBC BLD AUTO-RTO: 0 /100 WBC (ref 0–0.2)
PH UR STRIP.AUTO: 6 [PH] (ref 5–8)
PLATELET # BLD AUTO: 410 10*3/MM3 (ref 140–450)
PMV BLD AUTO: 9.4 FL (ref 6–12)
POTASSIUM SERPL-SCNC: 3.4 MMOL/L (ref 3.5–5.2)
PROT SERPL-MCNC: 7.4 G/DL (ref 6–8.5)
PROT UR QL STRIP: ABNORMAL
RBC # BLD AUTO: 4.75 10*6/MM3 (ref 3.77–5.28)
RBC # UR STRIP: ABNORMAL /HPF
REF LAB TEST METHOD: ABNORMAL
SODIUM SERPL-SCNC: 139 MMOL/L (ref 136–145)
SP GR UR STRIP: 1.01 (ref 1–1.03)
SQUAMOUS #/AREA URNS HPF: ABNORMAL /HPF
UROBILINOGEN UR QL STRIP: ABNORMAL
WBC # UR STRIP: ABNORMAL /HPF
WBC NRBC COR # BLD: 9.66 10*3/MM3 (ref 3.4–10.8)
WHOLE BLOOD HOLD COAG: NORMAL
WHOLE BLOOD HOLD SPECIMEN: NORMAL

## 2022-08-03 PROCEDURE — 80053 COMPREHEN METABOLIC PANEL: CPT

## 2022-08-03 PROCEDURE — 87507 IADNA-DNA/RNA PROBE TQ 12-25: CPT | Performed by: EMERGENCY MEDICINE

## 2022-08-03 PROCEDURE — 96374 THER/PROPH/DIAG INJ IV PUSH: CPT

## 2022-08-03 PROCEDURE — 81001 URINALYSIS AUTO W/SCOPE: CPT | Performed by: EMERGENCY MEDICINE

## 2022-08-03 PROCEDURE — 36415 COLL VENOUS BLD VENIPUNCTURE: CPT

## 2022-08-03 PROCEDURE — 25010000002 FENTANYL CITRATE (PF) 50 MCG/ML SOLUTION: Performed by: EMERGENCY MEDICINE

## 2022-08-03 PROCEDURE — 83605 ASSAY OF LACTIC ACID: CPT

## 2022-08-03 PROCEDURE — 85025 COMPLETE CBC W/AUTO DIFF WBC: CPT

## 2022-08-03 PROCEDURE — 25010000002 ONDANSETRON PER 1 MG: Performed by: EMERGENCY MEDICINE

## 2022-08-03 PROCEDURE — 74176 CT ABD & PELVIS W/O CONTRAST: CPT

## 2022-08-03 PROCEDURE — 96375 TX/PRO/DX INJ NEW DRUG ADDON: CPT

## 2022-08-03 PROCEDURE — 87493 C DIFF AMPLIFIED PROBE: CPT | Performed by: EMERGENCY MEDICINE

## 2022-08-03 PROCEDURE — 83690 ASSAY OF LIPASE: CPT

## 2022-08-03 PROCEDURE — 99284 EMERGENCY DEPT VISIT MOD MDM: CPT

## 2022-08-03 PROCEDURE — 87449 NOS EACH ORGANISM AG IA: CPT | Performed by: EMERGENCY MEDICINE

## 2022-08-03 RX ORDER — VANCOMYCIN HYDROCHLORIDE 125 MG/1
125 CAPSULE ORAL ONCE
Status: COMPLETED | OUTPATIENT
Start: 2022-08-03 | End: 2022-08-03

## 2022-08-03 RX ORDER — FENTANYL CITRATE 50 UG/ML
50 INJECTION, SOLUTION INTRAMUSCULAR; INTRAVENOUS ONCE
Status: COMPLETED | OUTPATIENT
Start: 2022-08-03 | End: 2022-08-03

## 2022-08-03 RX ORDER — SODIUM CHLORIDE 0.9 % (FLUSH) 0.9 %
10 SYRINGE (ML) INJECTION AS NEEDED
Status: DISCONTINUED | OUTPATIENT
Start: 2022-08-03 | End: 2022-08-03 | Stop reason: HOSPADM

## 2022-08-03 RX ORDER — VANCOMYCIN HYDROCHLORIDE 125 MG/1
125 CAPSULE ORAL EVERY 6 HOURS
Qty: 40 CAPSULE | Refills: 0 | Status: SHIPPED | OUTPATIENT
Start: 2022-08-03 | End: 2022-08-14

## 2022-08-03 RX ORDER — ONDANSETRON 2 MG/ML
4 INJECTION INTRAMUSCULAR; INTRAVENOUS ONCE
Status: COMPLETED | OUTPATIENT
Start: 2022-08-03 | End: 2022-08-03

## 2022-08-03 RX ORDER — DICYCLOMINE HCL 20 MG
20 TABLET ORAL EVERY 6 HOURS PRN
Qty: 10 TABLET | Refills: 0 | Status: SHIPPED | OUTPATIENT
Start: 2022-08-03 | End: 2022-08-30 | Stop reason: SDUPTHER

## 2022-08-03 RX ADMIN — FENTANYL CITRATE 50 MCG: 50 INJECTION INTRAMUSCULAR; INTRAVENOUS at 16:36

## 2022-08-03 RX ADMIN — VANCOMYCIN HYDROCHLORIDE 125 MG: 125 CAPSULE ORAL at 20:36

## 2022-08-03 RX ADMIN — ONDANSETRON 4 MG: 2 INJECTION INTRAMUSCULAR; INTRAVENOUS at 16:36

## 2022-08-03 RX ADMIN — SODIUM CHLORIDE 1000 ML: 9 INJECTION, SOLUTION INTRAVENOUS at 16:35

## 2022-08-03 NOTE — ED PROVIDER NOTES
[facility]GENC[facility] DEPARTMENT ENCOUNTER    Room Number:  08/08  Date seen:  8/3/2022  Time seen: 16:05 EDT  PCP: Kim Galindo APRN  Historian: patient      HPI:  Chief Complaint: abdominal pain, diarrhea    A complete HPI/ROS/PMH/PSH/SH/FH are unobtainable due to: none    Context: Janis Rosa is a 61 y.o. female who presents to the ED for evaluation of 3 to 4-day history of lower abdominal cramping and persistent severe diarrhea.  She states it is intermittent, is worse with any p.o. intake but also occur spontaneously, nothing makes it better.  She reports 5+ episodes of watery diarrhea that today appeared green and mucousy, it is nonbloody.  She also reports nausea but denies any vomiting, has associated poor appetite related to the nausea.  She reports some lower abdominal cramping that is an 8 out of 10 and worsens just prior to a bowel movement.  She took a course of antibiotics last week for hematuria which resolved.  She denies any recent travel, does not drink well or cistern water.  No hospital admissions in the last 90 days.  She denies any fevers but has had some chills, denies any hematuria dysuria urinary frequency.  She has a history of a colon resection secondary to diverticulitis as well as appendectomy and hysterectomy.        PAST MEDICAL HISTORY  Active Ambulatory Problems     Diagnosis Date Noted   • Rectal bleeding 12/14/2017   • Lower abdominal pain 12/14/2017   • Diverticulitis of large intestine without perforation or abscess without bleeding 02/08/2018   • Diverticulitis large intestine 03/19/2018   • Primary osteoarthritis of right knee 03/19/2020   • Candida, oral 02/01/2021   • Dysuria 02/01/2021   • Gastroesophageal reflux disease 04/01/2021   • Esophageal dysphagia 04/01/2021   • Breast cancer (HCC) 07/15/2021   • Endometrial cancer ([facility]) 07/15/2021   • Myelitis ([facility]) 07/15/2021   • Major depressive disorder, recurrent episode, moderate ([facility]) 03/31/2022   • Generalized anxiety  disorder with panic attacks 04/14/2022   • Family conflict 04/14/2022     Resolved Ambulatory Problems     Diagnosis Date Noted   • No Resolved Ambulatory Problems     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   • Depression    • Diverticulitis    • Epigastric pain    • Hoarse voice quality    • Melanoma (HCC)    • Panic attack    • Screening for alcoholism 4/14/2022   • Transverse myelitis (HCC)          PAST SURGICAL HISTORY  Past Surgical History:   Procedure Laterality Date   • APPENDECTOMY     • BREAST BIOPSY Right 03/02/2011    right breast stereotactic biopsy   • BREAST EXCISIONAL BIOPSY Right 03/11/2011    Excision biopsy, right breast with mammogram needle localization-Dr. Robert Hurley   • COLON RESECTION N/A 3/19/2018    Procedure: LAPAROSCOPIC SIGMOID COLON RESECTION;  Surgeon: Jaycob Anthony MD;  Location: [facility] MAIN OR;  Service: General   • COLONOSCOPY N/A 1/17/2018    Procedure: FLEXIBLE SIGMOIDOSCPY TO 50 CM;  Surgeon: Jaycob Anthony MD;  Location: [facility] ENDOSCOPY;  Service:    • ENDOSCOPY N/A 4/19/2021    Procedure: ESOPHAGOGASTRODUODENOSCOPY WITH COLD BIOPSIES;  Surgeon: Cali Hall MD;  Location: [facility] ENDOSCOPY;  Service: Gastroenterology;  Laterality: N/A;  PRE: DYSPHAGIA, REFLUX  POST: ESOPHAGITIS, IRREGULAR Z LINE   • HYSTERECTOMY     • SHOULDER SURGERY     • SKIN CANCER EXCISION      melanoma removed from neck   • TONSILLECTOMY AND ADENOIDECTOMY           FAMILY HISTORY  Family History   Problem Relation Age of Onset   • Bipolar disorder Mother         No formal diagnosis   • COPD Mother    • Heart disease Mother    • Alcohol abuse Father    • Bipolar disorder Father    • Suicide Attempts Paternal Uncle    • Breast cancer Maternal Grandfather    • Esophageal cancer Maternal Grandfather    • Malig Hyperthermia Neg Hx          SOCIAL HISTORY  Social History     Socioeconomic History   • Marital status:       Spouse name: Bharath   • Number of children: 3   Tobacco Use   • Smoking status: Former Smoker     Packs/day: 0.25     Years: 30.00     Pack years: 7.50     Types: Cigarettes     Start date: 1987     Quit date:      Years since quittin.5   • Smokeless tobacco: Never Used   • Tobacco comment: using a vape cig   Vaping Use   • Vaping Use: Every day   • Substances: Nicotine   • Devices: Pre-filled or refillable cartridge   Substance and Sexual Activity   • Alcohol use: Not Currently     Comment: every now and then   • Drug use: Never   • Sexual activity: Defer         ALLERGIES  Percocet [oxycodone-acetaminophen], Morphine and related, and Sulfa antibiotics        REVIEW OF SYSTEMS  Review of Systems     All systems reviewed and negative except for those discussed in HPI.       PHYSICAL EXAM  ED Triage Vitals [22 1459]   Temp Heart Rate Resp BP SpO2   98.4 °F (36.9 °C) (!) 133 18 121/77 96 %      Temp src Heart Rate Source Patient Position BP Location FiO2 (%)   -- -- -- -- --         GENERAL: not distressed  HENT: atraumatic  EYES: no scleral icterus  CV: regular rhythm, mild tachycardia with a rate of 110  RESPIRATORY: normal effort, CTA B  ABDOMEN: soft, mild diffuse lower abdominal tenderness particularly in the left lower quadrant and suprapubic area, nondistended normal bowel sounds no guarding or rigidity  MUSCULOSKELETAL: no deformity  NEURO: alert, moves all extremities, follows commands  SKIN: warm, dry    Vital signs and nursing notes reviewed.          LAB RESULTS  Recent Results (from the past 24 hour(s))   Comprehensive Metabolic Panel    Collection Time: 22  3:07 PM    Specimen: Blood   Result Value Ref Range    Glucose 105 (H) 65 - 99 mg/dL    BUN 5 (L) 6 - 20 mg/dL    Creatinine 0.76 0.57 - 1.00 mg/dL    Sodium 139 136 - 145 mmol/L    Potassium 3.4 (L) 3.5 - 5.2 mmol/L    Chloride 101 98 - 107 mmol/L    CO2 26.4 22.0 - 29.0 mmol/L    Calcium 9.8 8.6 - 10.5 mg/dL    Total Protein  7.4 6.0 - 8.5 g/dL    Albumin 4.50 3.50 - 5.20 g/dL    ALT (SGPT) 13 1 - 33 U/L    AST (SGOT) 20 1 - 32 U/L    Alkaline Phosphatase 91 39 - 117 U/L    Total Bilirubin 0.4 0.0 - 1.2 mg/dL    Globulin 2.9 gm/dL    A/G Ratio 1.6 g/dL    BUN/Creatinine Ratio 6.6 (L) 7.0 - 25.0    Anion Gap 11.6 5.0 - 15.0 mmol/L    eGFR 90.4 >60.0 mL/min/1.73   Lipase    Collection Time: 08/03/22  3:07 PM    Specimen: Blood   Result Value Ref Range    Lipase 20 13 - 60 U/L   Lactic Acid, Plasma    Collection Time: 08/03/22  3:07 PM    Specimen: Blood   Result Value Ref Range    Lactate 1.5 0.5 - 2.0 mmol/L   Green Top (Gel)    Collection Time: 08/03/22  3:07 PM   Result Value Ref Range    Extra Tube Hold for add-ons.    Lavender Top    Collection Time: 08/03/22  3:07 PM   Result Value Ref Range    Extra Tube hold for add-on    Gold Top - SST    Collection Time: 08/03/22  3:07 PM   Result Value Ref Range    Extra Tube Hold for add-ons.    Light Blue Top    Collection Time: 08/03/22  3:07 PM   Result Value Ref Range    Extra Tube Hold for add-ons.    CBC Auto Differential    Collection Time: 08/03/22  3:07 PM    Specimen: Blood   Result Value Ref Range    WBC 9.66 3.40 - 10.80 10*3/mm3    RBC 4.75 3.77 - 5.28 10*6/mm3    Hemoglobin 14.0 12.0 - 15.9 g/dL    Hematocrit 42.1 34.0 - 46.6 %    MCV 88.6 79.0 - 97.0 fL    MCH 29.5 26.6 - 33.0 pg    MCHC 33.3 31.5 - 35.7 g/dL    RDW 12.4 12.3 - 15.4 %    RDW-SD 40.1 37.0 - 54.0 fl    MPV 9.4 6.0 - 12.0 fL    Platelets 410 140 - 450 10*3/mm3    Neutrophil % 62.8 42.7 - 76.0 %    Lymphocyte % 21.9 19.6 - 45.3 %    Monocyte % 13.7 (H) 5.0 - 12.0 %    Eosinophil % 1.1 0.3 - 6.2 %    Basophil % 0.2 0.0 - 1.5 %    Immature Grans % 0.3 0.0 - 0.5 %    Neutrophils, Absolute 6.06 1.70 - 7.00 10*3/mm3    Lymphocytes, Absolute 2.12 0.70 - 3.10 10*3/mm3    Monocytes, Absolute 1.32 (H) 0.10 - 0.90 10*3/mm3    Eosinophils, Absolute 0.11 0.00 - 0.40 10*3/mm3    Basophils, Absolute 0.02 0.00 - 0.20 10*3/mm3     Immature Grans, Absolute 0.03 0.00 - 0.05 10*3/mm3    nRBC 0.0 0.0 - 0.2 /100 WBC   Urinalysis With Microscopic If Indicated (No Culture) - Urine, Clean Catch    Collection Time: 08/03/22  4:10 PM    Specimen: Urine, Clean Catch   Result Value Ref Range    Color, UA Yellow Yellow, Straw    Appearance, UA Clear Clear    pH, UA 6.0 5.0 - 8.0    Specific Gravity, UA 1.012 1.005 - 1.030    Glucose, UA Negative Negative    Ketones, UA 15 mg/dL (1+) (A) Negative    Bilirubin, UA Negative Negative    Blood, UA Moderate (2+) (A) Negative    Protein, UA 30 mg/dL (1+) (A) Negative    Leuk Esterase, UA Trace (A) Negative    Nitrite, UA Negative Negative    Urobilinogen, UA 0.2 E.U./dL 0.2 - 1.0 E.U./dL   Urinalysis, Microscopic Only - Urine, Clean Catch    Collection Time: 08/03/22  4:10 PM    Specimen: Urine, Clean Catch   Result Value Ref Range    RBC, UA 13-20 (A) None Seen, 0-2 /HPF    WBC, UA 3-5 (A) None Seen, 0-2 /HPF    Bacteria, UA None Seen None Seen /HPF    Squamous Epithelial Cells, UA 0-2 None Seen, 0-2 /HPF    Hyaline Casts, UA 3-6 None Seen /LPF    Methodology Automated Microscopy        Ordered the above labs and independently reviewed the results.        RADIOLOGY  CT Abdomen Pelvis Without Contrast   Final Result   1. There is some questionable thickening of the ascending and transverse   colon which may reflect a mild colitis. No evidence for abscess or fluid   collection   2. Indeterminate 1.4 cm hypodense lesion in the liver. Suggest   evaluation with nonemergent CT abdomen with and without IV contrast 4   phase liver protocol or MRI abdomen with and without contrast.       Radiation dose reduction techniques were utilized, including automated   exposure control and exposure modulation based on body size.       This report was finalized on 8/3/2022 5:57 PM by Dr. Joel Watts M.D.              I ordered the above noted radiological studies. Reviewed by me and discussed with radiologist.  See dictation  for official radiology interpretation.    PROCEDURES  Procedures        MEDICATIONS GIVEN IN ER  Medications   sodium chloride 0.9 % flush 10 mL (has no administration in time range)   sodium chloride 0.9 % bolus 1,000 mL (1,000 mL Intravenous New Bag 8/3/22 1635)   ondansetron (ZOFRAN) injection 4 mg (4 mg Intravenous Given 8/3/22 1636)   fentaNYL citrate (PF) (SUBLIMAZE) injection 50 mcg (50 mcg Intravenous Given 8/3/22 1636)             PROGRESS AND CONSULTS    Differential diagnosis includes but is not limited to:  - hepatobiliary pathology such as cholecystitis, cholangitis, and symptomatic cholelithiasis  - Pancreatitis  - Dyspepsia  - Small bowel obstruction  - Appendicitis  - Diverticulitis  - UTI including pyelonephritis  - Ureteral stone  - Zoster  - Colitis, including infectious and ischemic  - Atypical ACS      ED Course as of 08/03/22 1925   Wed Aug 03, 2022   1603 WBC: 9.66 [KA]   1603 Hemoglobin: 14.0 [KA]   1603 Lipase: 20 [KA]   1603 Glucose(!): 105 [KA]   1603 Creatinine: 0.76 [KA]   1603 Lactate: 1.5 [KA]   1923 I reassessed the patient, she is resting comfortably.  She has had 1 bowel movement in the ER during her 4-1/2-hour stay.  Stool studies pending, we will call her with the results and manage accordingly.  Patient now tells me she has actually had 2 courses of antibiotics recently, back-to-back, the first 1 for an ear infection which very well may have disturbed her normal gut bacteria and because this diarrhea, also may be infectious.  CT scan shows mild colitis.  She is afebrile, normal white blood cell count, no bloody stools and is tolerating p.o. without difficulty.  No sign of dehydration.  She stable for discharge with symptomatic treatment unless something needing treatment results on her stool studies.  She is agreeable with this plan.  Of encouraged to follow-up with her PCP.  We did discuss all incidental findings including but not limited to the undefined liver lesion that needs  follow-up.  She believes that this has been present previously and will check with her PCP. [KA]      ED Course User Index  [KA] Gloria Duran PA             Patient was placed in face mask in first look. Patient was wearing facemask each time I entered the room and throughout our encounter. I wore protective equipment throughout this patient encounter including a face mask, eye shield and gloves. Hand hygiene was performed before donning protective equipment and after removal when leaving the room.        DIAGNOSIS  Final diagnoses:   Colitis   Liver lesion         Follow Up:  Kim Galindo, APRN  [address] [id number] [phone]    In 2 days        RX:     Medication List      New Prescriptions    dicyclomine 20 MG tablet  Commonly known as: BENTYL  Take 1 tablet by mouth Every 6 (Six) Hours As Needed (abdominal cramps).           Where to Get Your Medications      These medications were sent to [facility] [place], [place] - [address] [place] [address] - [phone]  - [phone] [address] [address]    Phone: [phone]   · dicyclomine 20 MG tablet           Latest Documented Vital Signs:  As of 19:25 EDT  BP- 129/72 HR- 92 Temp- 98.4 °F (36.9 °C) O2 sat- 98%       Gloria Duran PA  08/03/22 1922

## 2022-08-03 NOTE — TELEPHONE ENCOUNTER
Caller: Janis Rosa    Relationship to patient: Self    PHONE : [phone]    Chief complaint:PATIENT STATES THAT SHE HAS DIARRHEA AFTER ANTIBIOTIC FOR UTI/DIARRHEA TURNED GREEN AND WATERY.     Type of visit: OFFICE VISIT    Requested date: ASAP    If rescheduling, when is the original appointment: N/A

## 2022-08-03 NOTE — ED NOTES
Pt via PV from home with c/o lower abd pain and diarrhea since Saturday.     All triage performed with this RN wearing appropriate PPE.  Pt placed in mask upon arrival to ED.

## 2022-08-03 NOTE — DISCHARGE INSTRUCTIONS
Stay well-hydrated  Avoid antidiarrheals  Gradually progress your diet as tolerated  Return to the ER for bloody stools, fever, unable to tolerate oral fluids for 8 hours, any concerns    An unspecified lesion was noted in your liver.  Radiologist recommends further outpatient evaluation to better qualify this.  This can be ordered by your PCP.

## 2022-08-04 LAB

## 2022-08-20 DIAGNOSIS — K21.9 GASTRO-ESOPHAGEAL REFLUX DISEASE WITHOUT ESOPHAGITIS: ICD-10-CM

## 2022-08-22 RX ORDER — OMEPRAZOLE 20 MG/1
CAPSULE, DELAYED RELEASE ORAL
Qty: 60 CAPSULE | Refills: 3 | Status: SHIPPED | OUTPATIENT
Start: 2022-08-22

## 2022-08-26 DIAGNOSIS — F33.9 EPISODE OF RECURRENT MAJOR DEPRESSIVE DISORDER, UNSPECIFIED DEPRESSION EPISODE SEVERITY: Primary | ICD-10-CM

## 2022-08-26 RX ORDER — VENLAFAXINE HYDROCHLORIDE 75 MG/1
225 CAPSULE, EXTENDED RELEASE ORAL DAILY
Qty: 90 CAPSULE | Refills: 0 | Status: SHIPPED | OUTPATIENT
Start: 2022-08-26 | End: 2022-10-24

## 2022-08-26 NOTE — TELEPHONE ENCOUNTER
30-day supply ordered, please call patient and have her schedule follow-up appointment has not been seen in some time.

## 2022-08-28 ENCOUNTER — DOCUMENTATION (OUTPATIENT)
Dept: FAMILY MEDICINE CLINIC | Facility: CLINIC | Age: 62
End: 2022-08-28

## 2022-08-28 RX ORDER — VANCOMYCIN HYDROCHLORIDE 125 MG/1
125 CAPSULE ORAL 4 TIMES DAILY
Qty: 56 CAPSULE | Refills: 0 | Status: SHIPPED | OUTPATIENT
Start: 2022-08-28 | End: 2022-09-08 | Stop reason: SDUPTHER

## 2022-08-30 RX ORDER — DICYCLOMINE HCL 20 MG
20 TABLET ORAL EVERY 6 HOURS PRN
Qty: 20 TABLET | Refills: 0 | Status: SHIPPED | OUTPATIENT
Start: 2022-08-30 | End: 2022-11-16 | Stop reason: SDUPTHER

## 2022-09-08 RX ORDER — VANCOMYCIN HYDROCHLORIDE 125 MG/1
125 CAPSULE ORAL 3 TIMES DAILY
Qty: 48 CAPSULE | Refills: 0 | Status: SHIPPED | OUTPATIENT
Start: 2022-09-08 | End: 2022-11-16 | Stop reason: SDUPTHER

## 2022-09-09 ENCOUNTER — OFFICE VISIT (OUTPATIENT)
Dept: FAMILY MEDICINE CLINIC | Facility: CLINIC | Age: 62
End: 2022-09-09

## 2022-09-09 VITALS
HEIGHT: 66 IN | TEMPERATURE: 97.3 F | BODY MASS INDEX: 22.18 KG/M2 | SYSTOLIC BLOOD PRESSURE: 138 MMHG | WEIGHT: 138 LBS | OXYGEN SATURATION: 97 % | HEART RATE: 71 BPM | DIASTOLIC BLOOD PRESSURE: 76 MMHG

## 2022-09-09 DIAGNOSIS — A04.72 C. DIFFICILE COLITIS: ICD-10-CM

## 2022-09-09 DIAGNOSIS — R35.0 URINARY FREQUENCY: Primary | ICD-10-CM

## 2022-09-09 DIAGNOSIS — R31.9 HEMATURIA, UNSPECIFIED TYPE: ICD-10-CM

## 2022-09-09 DIAGNOSIS — R10.9 ABDOMINAL DISCOMFORT: ICD-10-CM

## 2022-09-09 LAB
BILIRUB BLD-MCNC: NEGATIVE MG/DL
CLARITY, POC: ABNORMAL
COLOR UR: YELLOW
EXPIRATION DATE: ABNORMAL
GLUCOSE UR STRIP-MCNC: NEGATIVE MG/DL
KETONES UR QL: NEGATIVE
LEUKOCYTE EST, POC: ABNORMAL
Lab: ABNORMAL
NITRITE UR-MCNC: NEGATIVE MG/ML
PH UR: 7.5 [PH] (ref 5–8)
PROT UR STRIP-MCNC: ABNORMAL MG/DL
RBC # UR STRIP: ABNORMAL /UL
SP GR UR: 1.02 (ref 1–1.03)
UROBILINOGEN UR QL: NORMAL

## 2022-09-09 PROCEDURE — 99214 OFFICE O/P EST MOD 30 MIN: CPT | Performed by: NURSE PRACTITIONER

## 2022-09-09 PROCEDURE — 81003 URINALYSIS AUTO W/O SCOPE: CPT | Performed by: NURSE PRACTITIONER

## 2022-09-09 NOTE — PROGRESS NOTES
"Chief Complaint  GI Problem, Blood in Urine, and Urine frequency     Subjective        Janis Rosa presents to [facility]  History of Present Illness  This is a 61-year-old female patient here today for recurrence of C. difficile, abdominal discomfort, urinary frequency.  Patient was treated in the ER with C. difficile few weeks ago.  She completed vancomycin and was improving.  1 week later diarrhea restarted.  I did call her and another prescription of vancomycin and asked to return today.  She reports her diarrhea is slowly improving but she now has suprapubic pain.  Prior to her C. difficile she did have UTI/cystitis and was treated with antibiotics at that time.  She does report urinary frequency and hematuria today.  She is afebrile.  She denies any nausea or vomiting.        Objective   Vital Signs:  /76 (BP Location: Left arm, Patient Position: Sitting, Cuff Size: Adult)   Pulse 71   Temp 97.3 °F (36.3 °C) (Infrared)   Ht 168.4 cm (66.3\")   Wt 62.6 kg (138 lb)   SpO2 97%   BMI 22.07 kg/m²   Estimated body mass index is 22.07 kg/m² as calculated from the following:    Height as of this encounter: 168.4 cm (66.3\").    Weight as of this encounter: 62.6 kg (138 lb).    BMI is within normal parameters. No other follow-up for BMI required.      Physical Exam  Vitals and nursing note reviewed.   HENT:      Head: Normocephalic.      Nose: Nose normal.   Eyes:      Pupils: Pupils are equal, round, and reactive to light.   Cardiovascular:      Rate and Rhythm: Normal rate and regular rhythm.      Pulses: Normal pulses.      Heart sounds: Normal heart sounds.   Pulmonary:      Effort: Pulmonary effort is normal. No respiratory distress.      Breath sounds: Normal breath sounds. No wheezing or rales.   Abdominal:      General: Bowel sounds are normal. There is no distension.      Tenderness: There is abdominal tenderness (mild tenderness in suprapubic area). "   Musculoskeletal:         General: No swelling.      Cervical back: Neck supple.      Right lower leg: No edema.      Left lower leg: No edema.   Skin:     General: Skin is warm and dry.   Neurological:      Mental Status: She is alert and oriented to person, place, and time.   Psychiatric:         Mood and Affect: Mood normal.        Result Review :                Assessment and Plan   Diagnoses and all orders for this visit:    1. Urinary frequency (Primary)  -     POCT urinalysis dipstick, automated  -     Urine Culture - Urine, Urine, Clean Catch  -     CT Abdomen Pelvis Without Contrast    2. C. difficile colitis  -     CBC w AUTO Differential  -     Comprehensive metabolic panel  -     CT Abdomen Pelvis Without Contrast    3. Hematuria, unspecified type  -     POCT urinalysis dipstick, automated  -     Urine Culture - Urine, Urine, Clean Catch  -     CT Abdomen Pelvis Without Contrast    4. Abdominal discomfort  -     Cancel: XR Abdomen KUB  -     CT Abdomen Pelvis Without Contrast      I am attempting to order stat CT.  She has had 2 done 1 showing colitis in the previous 1 showing cystitis.  I am going to order CBCs CMP today.  I will send her urine for culture.  Patient was advised to report to the ER if symptoms worsen over the weekend    I spent a total of 30 minutes with the patient today including face-to-face encounter, reviewing data in the system, coordination of care with the nursing staff as well as consultants, documentation and entering orders.           Follow Up   No follow-ups on file.  Patient was given instructions and counseling regarding her condition or for health maintenance advice. Please see specific information pulled into the AVS if appropriate.

## 2022-09-10 LAB
ALBUMIN SERPL-MCNC: 3.9 G/DL (ref 3.8–4.9)
ALBUMIN/GLOB SERPL: 1.6 {RATIO} (ref 1.2–2.2)
ALP SERPL-CCNC: 77 IU/L (ref 44–121)
ALT SERPL-CCNC: 12 IU/L (ref 0–32)
AST SERPL-CCNC: 16 IU/L (ref 0–40)
BASOPHILS # BLD AUTO: 0 X10E3/UL (ref 0–0.2)
BASOPHILS NFR BLD AUTO: 0 %
BILIRUB SERPL-MCNC: <0.2 MG/DL (ref 0–1.2)
BUN SERPL-MCNC: 10 MG/DL (ref 6–24)
BUN/CREAT SERPL: 16 (ref 9–23)
CALCIUM SERPL-MCNC: 9.4 MG/DL (ref 8.7–10.2)
CHLORIDE SERPL-SCNC: 101 MMOL/L (ref 96–106)
CO2 SERPL-SCNC: 24 MMOL/L (ref 20–29)
CREAT SERPL-MCNC: 0.64 MG/DL (ref 0.57–1)
EGFRCR-CYS SERPLBLD CKD-EPI 2021: 102 ML/MIN/1.73
EOSINOPHIL # BLD AUTO: 0 X10E3/UL (ref 0–0.4)
EOSINOPHIL NFR BLD AUTO: 0 %
ERYTHROCYTE [DISTWIDTH] IN BLOOD BY AUTOMATED COUNT: 12.3 % (ref 11.7–15.4)
GLOBULIN SER CALC-MCNC: 2.5 G/DL (ref 1.5–4.5)
GLUCOSE SERPL-MCNC: 92 MG/DL (ref 65–99)
HCT VFR BLD AUTO: 39.5 % (ref 34–46.6)
HGB BLD-MCNC: 12 G/DL (ref 11.1–15.9)
IMM GRANULOCYTES # BLD AUTO: 0 X10E3/UL (ref 0–0.1)
IMM GRANULOCYTES NFR BLD AUTO: 0 %
LYMPHOCYTES # BLD AUTO: 1.7 X10E3/UL (ref 0.7–3.1)
LYMPHOCYTES NFR BLD AUTO: 15 %
MCH RBC QN AUTO: 27.5 PG (ref 26.6–33)
MCHC RBC AUTO-ENTMCNC: 30.4 G/DL (ref 31.5–35.7)
MCV RBC AUTO: 91 FL (ref 79–97)
MONOCYTES # BLD AUTO: 0.8 X10E3/UL (ref 0.1–0.9)
MONOCYTES NFR BLD AUTO: 7 %
NEUTROPHILS # BLD AUTO: 9 X10E3/UL (ref 1.4–7)
NEUTROPHILS NFR BLD AUTO: 78 %
PLATELET # BLD AUTO: 593 X10E3/UL (ref 150–450)
POTASSIUM SERPL-SCNC: 4.1 MMOL/L (ref 3.5–5.2)
PROT SERPL-MCNC: 6.4 G/DL (ref 6–8.5)
RBC # BLD AUTO: 4.36 X10E6/UL (ref 3.77–5.28)
SODIUM SERPL-SCNC: 139 MMOL/L (ref 134–144)
WBC # BLD AUTO: 11.7 X10E3/UL (ref 3.4–10.8)

## 2022-09-13 ENCOUNTER — HOSPITAL ENCOUNTER (OUTPATIENT)
Dept: CT IMAGING | Facility: HOSPITAL | Age: 62
Discharge: HOME | End: 2022-09-13
Admitting: NURSE PRACTITIONER

## 2022-09-13 PROCEDURE — 74176 CT ABD & PELVIS W/O CONTRAST: CPT

## 2022-09-15 LAB
BACTERIA UR CULT: ABNORMAL
BACTERIA UR CULT: ABNORMAL
OTHER ANTIBIOTIC SUSC ISLT: ABNORMAL

## 2022-09-15 RX ORDER — AMOXICILLIN AND CLAVULANATE POTASSIUM 875; 125 MG/1; MG/1
1 TABLET, FILM COATED ORAL 2 TIMES DAILY
Qty: 20 TABLET | Refills: 0 | Status: SHIPPED | OUTPATIENT
Start: 2022-09-15 | End: 2022-11-16

## 2022-10-11 DIAGNOSIS — F41.9 ANXIETY: ICD-10-CM

## 2022-10-11 RX ORDER — ALPRAZOLAM 0.5 MG/1
TABLET ORAL
Qty: 90 TABLET | Refills: 1 | Status: SHIPPED | OUTPATIENT
Start: 2022-10-11 | End: 2022-12-27

## 2022-10-11 NOTE — TELEPHONE ENCOUNTER
Next ov none    Last ov  09/09/22    Last lab  09/09/22    Last UDS  02/28/22    Last contract 02/01/21

## 2022-10-24 DIAGNOSIS — F33.9 EPISODE OF RECURRENT MAJOR DEPRESSIVE DISORDER, UNSPECIFIED DEPRESSION EPISODE SEVERITY: ICD-10-CM

## 2022-10-24 RX ORDER — VENLAFAXINE HYDROCHLORIDE 75 MG/1
CAPSULE, EXTENDED RELEASE ORAL
Qty: 90 CAPSULE | Refills: 2 | Status: SHIPPED | OUTPATIENT
Start: 2022-10-24 | End: 2022-12-21 | Stop reason: SDUPTHER

## 2022-11-03 ENCOUNTER — OFFICE VISIT (OUTPATIENT)
Dept: FAMILY MEDICINE CLINIC | Facility: CLINIC | Age: 62
End: 2022-11-03

## 2022-11-03 VITALS
TEMPERATURE: 96.9 F | OXYGEN SATURATION: 95 % | HEART RATE: 100 BPM | SYSTOLIC BLOOD PRESSURE: 108 MMHG | BODY MASS INDEX: 22.34 KG/M2 | HEIGHT: 66 IN | DIASTOLIC BLOOD PRESSURE: 70 MMHG | WEIGHT: 139 LBS

## 2022-11-03 DIAGNOSIS — A04.72 C. DIFFICILE COLITIS: ICD-10-CM

## 2022-11-03 DIAGNOSIS — R10.30 LOWER ABDOMINAL PAIN: Primary | ICD-10-CM

## 2022-11-03 DIAGNOSIS — F41.9 ANXIETY: ICD-10-CM

## 2022-11-03 PROCEDURE — 99214 OFFICE O/P EST MOD 30 MIN: CPT | Performed by: NURSE PRACTITIONER

## 2022-11-03 NOTE — PROGRESS NOTES
"Chief Complaint  referral  (Patient is wanting a referral due to her reoccurring cdiff. )    Subjective    {Problem List  Visit Diagnosis   Encounters  Notes  Medications  Labs  Result Review Imaging  Media :23}    Janis Rosa presents to [facility]  History of Present Illness  This is a 62-year-old female patient here today for evaluation of diarrhea.  Patient has ongoing problems with C. difficile and had to take taper dose of vancomycin.  She has been off vancomycin for 2 weeks and Tuesday had a bout of diarrhea.  She states she did have appetizer that night and after that day her diarrhea improved.  She was concerned with returning C. difficile and was here for referral.  She had lower abdominal pain with C. difficile and states sometimes symptoms come and go with her pain.  She denies any major pain today.  She has seen Dr. Hall in the past for dysphagia and had EGD.  She is also been seen by Dr. Anthony for sigmoid colectomy in 2018.  She does report normal bowel movements since her diarrhea.  No reports of abdominal pain or nausea vomiting today.  She is afebrile.      She is on Xanax as needed for ongoing anxiety.  She has seen psychiatry in the past where she is on Effexor and BuSpar.  She was taking hydroxyzine as needed with no great success.  Since her C. difficile she was more anxious and has returned back on Xanax twice a day.      Objective   Vital Signs:  /70   Pulse 100   Temp 96.9 °F (36.1 °C)   Ht 168.4 cm (66.3\")   Wt 63 kg (139 lb)   SpO2 95%   BMI 22.23 kg/m²   Estimated body mass index is 22.23 kg/m² as calculated from the following:    Height as of this encounter: 168.4 cm (66.3\").    Weight as of this encounter: 63 kg (139 lb).    BMI is within normal parameters. No other follow-up for BMI required.      Physical Exam  Vitals and nursing note reviewed.   HENT:      Head: Normocephalic.      Nose: Nose normal.   Eyes:      Pupils: Pupils " are equal, round, and reactive to light.   Cardiovascular:      Rate and Rhythm: Normal rate and regular rhythm.      Pulses: Normal pulses.      Heart sounds: Normal heart sounds.   Pulmonary:      Effort: Pulmonary effort is normal. No respiratory distress.      Breath sounds: Normal breath sounds. No wheezing or rales.   Abdominal:      General: Bowel sounds are normal. There is no distension.      Palpations: Abdomen is soft.      Tenderness: There is no abdominal tenderness. There is no right CVA tenderness or left CVA tenderness.   Musculoskeletal:         General: No swelling.      Cervical back: Neck supple.      Right lower leg: No edema.      Left lower leg: No edema.   Skin:     General: Skin is warm and dry.   Neurological:      Mental Status: She is alert and oriented to person, place, and time.   Psychiatric:         Mood and Affect: Mood normal.        Result Review :                Assessment and Plan   Diagnoses and all orders for this visit:    1. Lower abdominal pain (Primary)    2. C. difficile colitis    3. Anxiety    She is not having any major symptoms today and since she is established with Dr. aHll we have agreed that she will reach out to his office for an appointment.  If her symptoms worsen she should let me know and we will reevaluate at that time.  I have refilled her Xanax for now.  She will continue her Effexor and BuSpar.  Benny was reviewed and appropriate.  Urine drug screen and contract is on file    I spent a total of  30 minutes with the patient today including face-to-face encounter, reviewing data in the system, coordination of care with the nursing staff as well as consultants, documentation and entering orders.                    Follow Up   No follow-ups on file.  Patient was given instructions and counseling regarding her condition or for health maintenance advice. Please see specific information pulled into the AVS if appropriate.

## 2022-11-16 ENCOUNTER — TELEPHONE (OUTPATIENT)
Dept: GASTROENTEROLOGY | Facility: CLINIC | Age: 62
End: 2022-11-16

## 2022-11-16 DIAGNOSIS — R19.5 MUCUS IN STOOL: Primary | ICD-10-CM

## 2022-11-16 RX ORDER — VANCOMYCIN HYDROCHLORIDE 125 MG/1
125 CAPSULE ORAL 4 TIMES DAILY
Qty: 56 CAPSULE | Refills: 0 | Status: SHIPPED | OUTPATIENT
Start: 2022-11-16 | End: 2022-11-16 | Stop reason: SDUPTHER

## 2022-11-16 RX ORDER — VANCOMYCIN HYDROCHLORIDE 125 MG/1
125 CAPSULE ORAL 4 TIMES DAILY
Qty: 56 CAPSULE | Refills: 0 | Status: SHIPPED | OUTPATIENT
Start: 2022-11-16 | End: 2022-12-19

## 2022-11-16 RX ORDER — DICYCLOMINE HCL 20 MG
20 TABLET ORAL EVERY 6 HOURS PRN
Qty: 20 TABLET | Refills: 0 | Status: SHIPPED | OUTPATIENT
Start: 2022-11-16 | End: 2022-11-29

## 2022-11-16 NOTE — TELEPHONE ENCOUNTER
Caller: Janis Rosa    Relationship to patient: Self    Best call back [phone]    Type of visit: CONSULTATION     Requested date: NEXT AVAILABLE APPOINTMENT.     If rescheduling, when is the original appointment: 12/16/22      Additional notes:PATIENT REQUESTED A SOONER APPOINTMENT DUE TO POSSIBLE REEMERGENCE OF C. DIFF AND ABDOMINAL PAIN.

## 2022-11-17 NOTE — TELEPHONE ENCOUNTER
We have not seen her in quite some time I think office visit with physical examination will determine next course of action.

## 2022-11-18 LAB — C DIFF TOX GENS STL QL NAA+PROBE: POSITIVE

## 2022-11-21 DIAGNOSIS — M25.461 PAIN AND SWELLING OF KNEE, RIGHT: ICD-10-CM

## 2022-11-21 DIAGNOSIS — M25.561 PAIN AND SWELLING OF KNEE, RIGHT: ICD-10-CM

## 2022-11-21 RX ORDER — TRAMADOL HYDROCHLORIDE 50 MG/1
50 TABLET ORAL EVERY 6 HOURS PRN
Qty: 20 TABLET | Refills: 0 | Status: SHIPPED | OUTPATIENT
Start: 2022-11-21 | End: 2022-12-19

## 2022-11-21 RX ORDER — TRAMADOL HYDROCHLORIDE 50 MG/1
50 TABLET ORAL EVERY 6 HOURS PRN
Qty: 20 TABLET | Refills: 0 | Status: SHIPPED | OUTPATIENT
Start: 2022-11-21 | End: 2022-11-21 | Stop reason: SDUPTHER

## 2022-11-29 ENCOUNTER — OFFICE VISIT (OUTPATIENT)
Dept: GASTROENTEROLOGY | Facility: CLINIC | Age: 62
End: 2022-11-29

## 2022-11-29 VITALS
OXYGEN SATURATION: 96 % | HEART RATE: 87 BPM | DIASTOLIC BLOOD PRESSURE: 69 MMHG | WEIGHT: 135.4 LBS | HEIGHT: 66 IN | TEMPERATURE: 96.8 F | SYSTOLIC BLOOD PRESSURE: 112 MMHG | BODY MASS INDEX: 21.76 KG/M2

## 2022-11-29 DIAGNOSIS — A04.72 C. DIFFICILE DIARRHEA: Primary | ICD-10-CM

## 2022-11-29 DIAGNOSIS — K76.9 LIVER LESION: ICD-10-CM

## 2022-11-29 PROCEDURE — 99214 OFFICE O/P EST MOD 30 MIN: CPT | Performed by: NURSE PRACTITIONER

## 2022-11-29 NOTE — PROGRESS NOTES
Chief Complaint   Patient presents with   • C. difficile       HPI    Janis Rosa is a  62 y.o. female here for a follow up visit for C. difficile.    This patient follows Dr. Hall, new to me.    She has a history of endometrial cancer, smoking, melanoma, and colon resection for diverticulitis.    She presented to the emergency room in August for complaints of abdominal pain with diarrhea.  CT showed mild colitis, indeterminant hypodense lesion of the liver consider MRI.  Stool testing was positive for C. difficile.  Treated with vancomycin.    Since that time she has been treated with a combination of Augmentin and vancomycin in September (she had a urinary tract infection with diarrhea however stool testing was not performed).  Symptoms of rectal mucus and rectal bleeding returned earlier this month without diarrhea and follow-up stool testing positive for C. Difficile. She is currently on vancomycin for the third time.  She is also taking Culturelle probiotics.    Currently she is passing formed stools without rectal mucus or rectal bleeding.  No abdominal pain or rectal pain.    She reports oral lesions with third round of vancomycin.  She is currently using Magic mouthwash and taking tramadol as needed for oral pain.    Her last colonoscopy was 2018 prior to colon resection with marked diverticulosis and partial colonoscopy.    EGD 4/2021 --irregular Z-line otherwise normal.  Path + reflux esophagitis.    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    s/p excisional biopsy, rt breast w/ mammogram needle localization   • Depression    • Diverticulitis    • Endometrial cancer (HCC)    • Epigastric pain    • Hoarse voice quality    • Melanoma (HCC)    • Panic attack    • Screening for alcoholism 4/14/2022   • Transverse myelitis (HCC)     HISTORY OF YEARS AGO       Past Surgical History:   Procedure Laterality Date   • APPENDECTOMY     • BREAST  BIOPSY Right 2011    right breast stereotactic biopsy   • BREAST EXCISIONAL BIOPSY Right 2011    Excision biopsy, right breast with mammogram needle localization-Dr. Robert Hurley   • COLON RESECTION N/A 3/19/2018    Procedure: LAPAROSCOPIC SIGMOID COLON RESECTION;  Surgeon: Jaycob Anthony MD;  Location: [facility] MAIN OR;  Service: General   • COLONOSCOPY N/A 2018    Procedure: FLEXIBLE SIGMOIDOSCPY TO 50 CM;  Surgeon: Jaycob Anthony MD;  Location: [facility] ENDOSCOPY;  Service:    • ENDOSCOPY N/A 2021    Procedure: ESOPHAGOGASTRODUODENOSCOPY WITH COLD BIOPSIES;  Surgeon: Cali Hall MD;  Location: [facility] ENDOSCOPY;  Service: Gastroenterology;  Laterality: N/A;  PRE: DYSPHAGIA, REFLUX  POST: ESOPHAGITIS, IRREGULAR Z LINE   • HYSTERECTOMY     • SHOULDER SURGERY     • SKIN CANCER EXCISION      melanoma removed from neck   • TONSILLECTOMY AND ADENOIDECTOMY         Scheduled Meds:     Continuous Infusions: No current facility-administered medications for this visit.      PRN Meds:     Allergies   Allergen Reactions   • Percocet [Oxycodone-Acetaminophen] Nausea Only   • Morphine And Related Rash   • Sulfa Antibiotics Rash       Social History     Socioeconomic History   • Marital status:      Spouse name: Bharath   • Number of children: 3   Tobacco Use   • Smoking status: Former     Packs/day: 0.25     Years: 30.00     Pack years: 7.50     Types: Cigarettes     Start date: 1987     Quit date: 2020     Years since quittin.9   • Smokeless tobacco: Never   • Tobacco comments:     using a vape cig   Vaping Use   • Vaping Use: Every day   • Substances: Nicotine   • Devices: Pre-filled or refillable cartridge   Substance and Sexual Activity   • Alcohol use: Not Currently     Comment: every now and then   • Drug use: Never   • Sexual activity: Defer       Family History   Problem Relation Age of Onset   • Bipolar disorder Mother         No formal diagnosis   • COPD Mother    • Heart  disease Mother    • Alcohol abuse Father    • Bipolar disorder Father    • Suicide Attempts Paternal Uncle    • Breast cancer Maternal Grandfather    • Esophageal cancer Maternal Grandfather    • Malig Hyperthermia Neg Hx        Review of Systems   Constitutional: Negative for activity change, appetite change, fatigue, fever and unexpected weight change.   HENT: Positive for mouth sores. Negative for trouble swallowing.    Respiratory: Negative for apnea, cough, choking, chest tightness, shortness of breath and wheezing.    Cardiovascular: Negative for chest pain, palpitations and leg swelling.   Gastrointestinal: Negative for abdominal distention, abdominal pain, anal bleeding, blood in stool, constipation, diarrhea, nausea, rectal pain and vomiting.       Vitals:    11/29/22 1047   BP: 112/69   Pulse: 87   Temp: 96.8 °F (36 °C)   SpO2: 96%       Physical Exam  Constitutional:       Appearance: She is well-developed.   HENT:      Mouth/Throat:      Comments: Oral lesions noted  Abdominal:      General: Bowel sounds are normal. There is no distension.      Palpations: Abdomen is soft. There is no mass.      Tenderness: There is no abdominal tenderness. There is no guarding.      Hernia: No hernia is present.   Skin:     General: Skin is warm and dry.      Capillary Refill: Capillary refill takes less than 2 seconds.   Neurological:      Mental Status: She is alert and oriented to person, place, and time.   Psychiatric:         Behavior: Behavior normal.     Assessment    Diagnoses and all orders for this visit:    1. C. difficile diarrhea (Primary)  -     Ambulatory Referral to Infectious Disease    2. Liver lesion  -     MRI abdomen w wo contrast mrcp; Future       Plan    Plan of care discussed further with Dr. Hall given patient's recurrent C. difficile recommend referral to infectious disease for evaluation treatment  Continue current course of vancomycin as prescribed per PCP  Continue Culturelle  probiotic  Regarding liver lesion seen on CT recommend MRCP for further evaluation  Follow-up and further recommendations pending imaging results and consultation with ABDI Woodard  [facility]  [address] [place], [place] [id number]  Office: [phone]

## 2022-12-14 ENCOUNTER — HOSPITAL ENCOUNTER (OUTPATIENT)
Dept: MRI IMAGING | Facility: HOSPITAL | Age: 62
End: 2022-12-14

## 2022-12-19 ENCOUNTER — OFFICE VISIT (OUTPATIENT)
Dept: INFECTIOUS DISEASES | Facility: CLINIC | Age: 62
End: 2022-12-19

## 2022-12-19 VITALS
HEART RATE: 90 BPM | RESPIRATION RATE: 16 BRPM | BODY MASS INDEX: 21.34 KG/M2 | WEIGHT: 132.8 LBS | DIASTOLIC BLOOD PRESSURE: 71 MMHG | TEMPERATURE: 97.9 F | HEIGHT: 66 IN | SYSTOLIC BLOOD PRESSURE: 112 MMHG

## 2022-12-19 DIAGNOSIS — A04.72 C. DIFFICILE COLITIS: Primary | ICD-10-CM

## 2022-12-19 DIAGNOSIS — R31.0 GROSS HEMATURIA: ICD-10-CM

## 2022-12-19 PROCEDURE — 99204 OFFICE O/P NEW MOD 45 MIN: CPT | Performed by: STUDENT IN AN ORGANIZED HEALTH CARE EDUCATION/TRAINING PROGRAM

## 2022-12-19 RX ORDER — PUMPKIN SEED EXTRACT/SOY GERM 300 MG
CAPSULE ORAL
COMMUNITY
End: 2023-02-27

## 2022-12-19 NOTE — PROGRESS NOTES
"Chief Complaint  new patient    Subjective        Janis Rosa presents to [facility]  History of Present Illness    Patient is a 62-year-old female with past diagnosis of C. difficile colitis that was referred to infectious disease for further evaluation.      Diagnosed in August and placed on 10 days of vancomycin therapy after being treated for urinary tract infection. But soon after developed recurrent symptoms after she was treated again for urinary tract infection.  She reports both time she had hematuria with the first time having dysuria.  The second time she reports she just had gross hematuria.  She has had imaging to rule out kidney stone which was also negative.  This also showed that she had mild colitis and she was placed on a vancomycin taper during her second round of treatment.  She reports this was for 5 weeks and she just completed this last week.    She reports that she has had improvement in her stools.  Reports that she is back to baseline with her stools and they are not liquidy.  She has had no further diarrhea.  States that she had some mucousy discharge but otherwise has not had any change in consistency.  Denies any blood in her stool.  Denies any abdominal pain.  Denies any fevers or chills.  States she did start to develop some discoloration of her urine yesterday.  Denies any other urinary symptoms.  Reports she feels quite anxious.    Objective   Vital Signs:  /71 (BP Location: Left arm, Patient Position: Sitting, Cuff Size: Adult)   Pulse 90   Temp 97.9 °F (36.6 °C)   Resp 16   Ht 167.6 cm (65.98\")   Wt 60.2 kg (132 lb 12.8 oz)   BMI 21.44 kg/m²   Estimated body mass index is 21.44 kg/m² as calculated from the following:    Height as of this encounter: 167.6 cm (65.98\").    Weight as of this encounter: 60.2 kg (132 lb 12.8 oz).    BMI is within normal parameters. No other follow-up for BMI required.      Physical Exam  Constitutional: "       General: She is not in acute distress.     Appearance: Normal appearance. She is normal weight. She is not ill-appearing.   HENT:      Head: Normocephalic and atraumatic.      Nose: Nose normal. No rhinorrhea.      Mouth/Throat:      Mouth: Mucous membranes are moist.      Pharynx: No oropharyngeal exudate.   Eyes:      General: No scleral icterus.     Extraocular Movements: Extraocular movements intact.      Pupils: Pupils are equal, round, and reactive to light.   Cardiovascular:      Rate and Rhythm: Normal rate and regular rhythm.      Pulses: Normal pulses.      Heart sounds: Normal heart sounds. No murmur heard.  Pulmonary:      Effort: Pulmonary effort is normal. No respiratory distress.      Breath sounds: Normal breath sounds. No stridor.   Abdominal:      General: Abdomen is flat. Bowel sounds are normal. There is no distension.      Palpations: Abdomen is soft.   Musculoskeletal:         General: No swelling or tenderness. Normal range of motion.      Cervical back: Normal range of motion and neck supple. No tenderness.      Right lower leg: No edema.      Left lower leg: No edema.   Skin:     General: Skin is warm and dry.      Findings: No rash.   Neurological:      General: No focal deficit present.      Mental Status: She is alert and oriented to person, place, and time.   Psychiatric:         Mood and Affect: Mood normal.         Behavior: Behavior normal.        Result Review :  The following data was reviewed by: Pito Mcconnell DO on 12/19/2022:  Common labs    Common Labs 8/3/22 8/3/22 9/9/22 9/9/22    1507 1507 1204 1204   Glucose  105 (A)  92   BUN  5 (A)  10   Creatinine  0.76  0.64   Sodium  139  139   Potassium  3.4 (A)  4.1   Chloride  101  101   Calcium  9.8  9.4   Total Protein    6.4   Albumin  4.50  3.9   Total Bilirubin  0.4  <0.2   Alkaline Phosphatase  91  77   AST (SGOT)  20  16   ALT (SGPT)  13  12   WBC 9.66  11.7 (A)    Hemoglobin 14.0  12.0    Hematocrit 42.1  39.5     Platelets 410  593 (A)    (A) Abnormal value            Data reviewed: Radiologic studies With CTs of the abdomen and pelvis, Consultant notes From gastroenterology and GI studies To include GI PCR and C. difficile testing          Assessment and Plan   Diagnoses and all orders for this visit:    1. C. difficile colitis (Primary)    2. Gross hematuria  -     Ambulatory Referral to Urology    Today patient is reporting good improvement after her recent vancomycin taper for C. difficile therapy.  We discussed in detail the clinical course associated with C. difficile and its risk factors.  She was counseled extensively on antibiotic use.    In order to limit her antibiotic usage I think it would be pertinent for her to see her urologist again.  She reports she saw them quite some time ago but has not seen them since then given her gross hematuria recently it does not seem unreasonable.  She is not having any urinary symptoms today and so I will avoid antibiotics.    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  We discussed if she were to develop recurrent symptoms then testing with both toxin PCR and antigen is desired.         I spent  minutes caring for Janis on this date of service. This time includes time spent by me in the following activities:preparing for the visit, reviewing tests, obtaining and/or reviewing a separately obtained history, performing a medically appropriate examination and/or evaluation , counseling and educating the patient/family/caregiver, referring and communicating with other health care professionals , documenting information in the medical record, independently interpreting results and communicating that information with the patient/family/caregiver and care coordination  Follow Up   No follow-ups on file.  Patient was given instructions and counseling regarding her condition or for health  maintenance advice. Please see specific information pulled into the AVS if appropriate.

## 2022-12-21 DIAGNOSIS — F33.9 EPISODE OF RECURRENT MAJOR DEPRESSIVE DISORDER, UNSPECIFIED DEPRESSION EPISODE SEVERITY: ICD-10-CM

## 2022-12-21 RX ORDER — VENLAFAXINE HYDROCHLORIDE 75 MG/1
225 CAPSULE, EXTENDED RELEASE ORAL DAILY
Qty: 90 CAPSULE | Refills: 0 | Status: SHIPPED | OUTPATIENT
Start: 2022-12-21 | End: 2023-01-24 | Stop reason: SDUPTHER

## 2022-12-22 ENCOUNTER — PATIENT ROUNDING (BHMG ONLY) (OUTPATIENT)
Dept: INFECTIOUS DISEASES | Facility: CLINIC | Age: 62
End: 2022-12-22

## 2022-12-22 NOTE — PROGRESS NOTES
December 22, 2022    [facility]  [address] [address] [phone].         We're always looking for ways to make our patients' experiences even better. Do you have recommendations on ways we may improve?     Overall were you satisfied with your first visit to our practice?      I appreciate you taking the time to speak with me today. Is there anything else I can do for you?     Thank you, and have a great day.

## 2022-12-25 DIAGNOSIS — F41.9 ANXIETY: ICD-10-CM

## 2022-12-27 ENCOUNTER — TRANSCRIBE ORDERS (OUTPATIENT)
Dept: ADMINISTRATIVE | Facility: HOSPITAL | Age: 62
End: 2022-12-27

## 2022-12-27 DIAGNOSIS — R31.0 GROSS HEMATURIA: Primary | ICD-10-CM

## 2022-12-27 DIAGNOSIS — Z87.440 PERSONAL HISTORY OF URINARY (TRACT) INFECTION: ICD-10-CM

## 2022-12-27 RX ORDER — ALPRAZOLAM 0.5 MG/1
TABLET ORAL
Qty: 90 TABLET | Refills: 1 | Status: SHIPPED | OUTPATIENT
Start: 2022-12-27 | End: 2023-02-08 | Stop reason: SDUPTHER

## 2022-12-27 NOTE — TELEPHONE ENCOUNTER
Next ov none    Last ov 11/03/22    Last lab 09/09/22    Last UDS  02/28/22    Last contract 02/01/21      Patient will need to come in to get a updated contract.

## 2023-01-24 DIAGNOSIS — F33.9 EPISODE OF RECURRENT MAJOR DEPRESSIVE DISORDER, UNSPECIFIED DEPRESSION EPISODE SEVERITY: ICD-10-CM

## 2023-01-24 RX ORDER — VENLAFAXINE HYDROCHLORIDE 75 MG/1
225 CAPSULE, EXTENDED RELEASE ORAL DAILY
Qty: 90 CAPSULE | Refills: 0 | Status: SHIPPED | OUTPATIENT
Start: 2023-01-24 | End: 2023-02-22

## 2023-01-27 ENCOUNTER — OFFICE VISIT (OUTPATIENT)
Dept: FAMILY MEDICINE CLINIC | Facility: CLINIC | Age: 63
End: 2023-01-27
Payer: COMMERCIAL

## 2023-01-27 VITALS
WEIGHT: 131 LBS | DIASTOLIC BLOOD PRESSURE: 64 MMHG | HEART RATE: 96 BPM | HEIGHT: 66 IN | TEMPERATURE: 97.3 F | SYSTOLIC BLOOD PRESSURE: 122 MMHG | BODY MASS INDEX: 21.05 KG/M2 | OXYGEN SATURATION: 99 %

## 2023-01-27 DIAGNOSIS — N39.0 FREQUENT UTI: Primary | ICD-10-CM

## 2023-01-27 DIAGNOSIS — A04.72 C. DIFFICILE COLITIS: Primary | ICD-10-CM

## 2023-01-27 PROCEDURE — 99214 OFFICE O/P EST MOD 30 MIN: CPT | Performed by: NURSE PRACTITIONER

## 2023-01-27 RX ORDER — VANCOMYCIN HYDROCHLORIDE 125 MG/1
125 CAPSULE ORAL 4 TIMES DAILY
Qty: 40 CAPSULE | Refills: 0 | Status: SHIPPED | OUTPATIENT
Start: 2023-01-27 | End: 2023-02-27

## 2023-01-27 NOTE — PROGRESS NOTES
"Chief Complaint  Follow-up (Patient is here to follow up regarding her reoccurrence of cdiff)    Subjective        Janis Rosa presents to [facility]  History of Present Illness  This is a 62 year female patient here today for evaluation of diarrhea. She has been struggling with Cdiff that first started after treatment of UTI. She has been evaluated by Dr Mcconnell with ID and has recently seen urology. She was recently treated for UTI and was also given vanc 250mg po bid x 10 days. She completed her vancomycin 10 days ago and reports diarrhea started on wed. She reports mucus in stool. She denies fever but does have abd cramping and nausea. She denies any urinary symptoms. She was scheduled for Cystoscopy today but canceled due to diarrhea. She is requesting to be transferred to [facility] urology [facility] if possible.      Objective   Vital Signs:  /64   Pulse 96   Temp 97.3 °F (36.3 °C)   Ht 167.6 cm (65.98\")   Wt 59.4 kg (131 lb)   SpO2 99%   BMI 21.15 kg/m²   Estimated body mass index is 21.15 kg/m² as calculated from the following:    Height as of this encounter: 167.6 cm (65.98\").    Weight as of this encounter: 59.4 kg (131 lb).       BMI is within normal parameters. No other follow-up for BMI required.      Physical Exam  Vitals and nursing note reviewed.   HENT:      Head: Normocephalic.      Nose: Nose normal.   Eyes:      Pupils: Pupils are equal, round, and reactive to light.   Cardiovascular:      Rate and Rhythm: Normal rate and regular rhythm.      Pulses: Normal pulses.      Heart sounds: Normal heart sounds.   Pulmonary:      Effort: Pulmonary effort is normal. No respiratory distress.      Breath sounds: Normal breath sounds. No wheezing or rales.   Abdominal:      General: Bowel sounds are normal. There is no distension.      Tenderness: There is no abdominal tenderness.   Musculoskeletal:         General: No swelling.      Cervical back: Neck supple.      " Right lower leg: No edema.      Left lower leg: No edema.   Skin:     General: Skin is warm and dry.   Neurological:      Mental Status: She is alert and oriented to person, place, and time.   Psychiatric:         Mood and Affect: Mood normal.        Result Review :                   Assessment and Plan   Diagnoses and all orders for this visit:    1. Frequent UTI (Primary)  -     Ambulatory Referral to Urology    Other orders  -     vancomycin (Vancocin) 125 MG capsule; Take 1 capsule by mouth 4 (Four) Times a Day. Take 1 capsule by mouth 4 (Four) Times a Day. 3xday x 1week, then twice daily x 1 week, then daily x 1 week, then every 48 hrs x 1 week, then 125mg every 3rd day x 1 week  Dispense: 40 capsule; Refill: 0      I have discussed case with Dr Mcconnell and he put in order for cdiff. He did request to go ahead and treat with taper vanc so I will get that started today.  I will work on new urology referral thru [facility]     I spent a total of 30 minutes with the patient today including face-to-face encounter, reviewing data in the system, coordination of care with the nursing staff as well as consultants, documentation and entering orders.           Follow Up   No follow-ups on file.  Patient was given instructions and counseling regarding her condition or for health maintenance advice. Please see specific information pulled into the AVS if appropriate.       Answers for HPI/ROS submitted by the patient on 1/27/2023  What is the primary reason for your visit?: Abdominal Pain

## 2023-02-06 RX ORDER — FLUCONAZOLE 150 MG/1
150 TABLET ORAL ONCE
Qty: 1 TABLET | Refills: 0 | Status: SHIPPED | OUTPATIENT
Start: 2023-02-06 | End: 2023-02-06

## 2023-02-08 DIAGNOSIS — F41.9 ANXIETY: ICD-10-CM

## 2023-02-08 RX ORDER — ALPRAZOLAM 0.5 MG/1
0.5 TABLET ORAL 3 TIMES DAILY PRN
Qty: 90 TABLET | Refills: 1 | Status: SHIPPED | OUTPATIENT
Start: 2023-02-08

## 2023-02-21 DIAGNOSIS — F33.9 EPISODE OF RECURRENT MAJOR DEPRESSIVE DISORDER, UNSPECIFIED DEPRESSION EPISODE SEVERITY: ICD-10-CM

## 2023-02-22 RX ORDER — VENLAFAXINE HYDROCHLORIDE 75 MG/1
CAPSULE, EXTENDED RELEASE ORAL
Qty: 90 CAPSULE | Refills: 0 | Status: SHIPPED | OUTPATIENT
Start: 2023-02-22 | End: 2023-02-28 | Stop reason: SDUPTHER

## 2023-02-27 ENCOUNTER — OFFICE VISIT (OUTPATIENT)
Dept: UROLOGY | Facility: CLINIC | Age: 63
End: 2023-02-27
Payer: COMMERCIAL

## 2023-02-27 VITALS — RESPIRATION RATE: 15 BRPM | WEIGHT: 132 LBS | HEIGHT: 68 IN | BODY MASS INDEX: 20 KG/M2

## 2023-02-27 DIAGNOSIS — R30.0 DYSURIA: Primary | ICD-10-CM

## 2023-02-27 DIAGNOSIS — Z87.440 HISTORY OF RECURRENT UTIS: ICD-10-CM

## 2023-02-27 DIAGNOSIS — R31.0 GROSS HEMATURIA: ICD-10-CM

## 2023-02-27 DIAGNOSIS — N89.8 VAGINAL DISCHARGE: ICD-10-CM

## 2023-02-27 LAB
BILIRUB BLD-MCNC: NEGATIVE MG/DL
CLARITY, POC: CLEAR
COLOR UR: YELLOW
EXPIRATION DATE: ABNORMAL
GLUCOSE UR STRIP-MCNC: NEGATIVE MG/DL
KETONES UR QL: NEGATIVE
LEUKOCYTE EST, POC: ABNORMAL
Lab: ABNORMAL
NITRITE UR-MCNC: NEGATIVE MG/ML
PH UR: 6.5 [PH] (ref 5–8)
PROT UR STRIP-MCNC: NEGATIVE MG/DL
RBC # UR STRIP: ABNORMAL /UL
SP GR UR: 1.03 (ref 1–1.03)
UROBILINOGEN UR QL: ABNORMAL

## 2023-02-27 PROCEDURE — 87491 CHLMYD TRACH DNA AMP PROBE: CPT | Performed by: NURSE PRACTITIONER

## 2023-02-27 PROCEDURE — 81003 URINALYSIS AUTO W/O SCOPE: CPT | Performed by: NURSE PRACTITIONER

## 2023-02-27 PROCEDURE — 99214 OFFICE O/P EST MOD 30 MIN: CPT | Performed by: NURSE PRACTITIONER

## 2023-02-27 PROCEDURE — 87591 N.GONORRHOEAE DNA AMP PROB: CPT | Performed by: NURSE PRACTITIONER

## 2023-02-27 PROCEDURE — 87529 HSV DNA AMP PROBE: CPT | Performed by: NURSE PRACTITIONER

## 2023-02-27 RX ORDER — ESTRADIOL 0.1 MG/G
1 CREAM VAGINAL 3 TIMES WEEKLY
Qty: 42.5 G | Refills: 3 | Status: SHIPPED | OUTPATIENT
Start: 2023-02-27 | End: 2023-05-28

## 2023-02-27 NOTE — PROGRESS NOTES
"Chief Complaint: Urinary Tract Infection (F[facility]u from [facility] ) and Vaginitis    Subjective         History of Present Illness  Jnais Rosa is a 62 y.o. female presents to [facility] to be seen for recurrent UTIs.    She has been seen by [facility] fir recurrent UTIs with gross hematuria.     She has been seen by infectious disease, GI and Urology as she was with C. Diff colitis after repeated TX with antibiotics.    She sates she has \"two ulcerated places\" on her labia feels as if she has a yeast infeciton.     She has had a change in sexual partner recently.     She states she has had vaginal d/c.    She is taking probiotics daily.     She is s/p hysterectomy.     She has had multiple ct scans and was recommended to have a cysto per DR. Mack but has not had that performed.    She states her urine is cloudy today.     Objective     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    s/p excisional biopsy, rt breast w/ mammogram needle localization   • Depression    • Diverticulitis    • Endometrial cancer (HCC)    • Epigastric pain    • Hoarse voice quality    • Melanoma (HCC)    • Panic attack    • Screening for alcoholism 4/14/2022   • Transverse myelitis (HCC)     HISTORY OF YEARS AGO       Past Surgical History:   Procedure Laterality Date   • APPENDECTOMY     • BREAST BIOPSY Right 03/02/2011    right breast stereotactic biopsy   • BREAST EXCISIONAL BIOPSY Right 03/11/2011    Excision biopsy, right breast with mammogram needle localization-Dr. Robert Hurley   • COLON RESECTION N/A 3/19/2018    Procedure: LAPAROSCOPIC SIGMOID COLON RESECTION;  Surgeon: Jaycob Anthony MD;  Location: [facility] MAIN OR;  Service: General   • COLONOSCOPY N/A 1/17/2018    Procedure: FLEXIBLE SIGMOIDOSCPY TO 50 CM;  Surgeon: Jaycob Anthony MD;  Location: [facility] ENDOSCOPY;  Service:    • ENDOSCOPY N/A 4/19/2021    Procedure: " ESOPHAGOGASTRODUODENOSCOPY WITH COLD BIOPSIES;  Surgeon: Cali Hall MD;  Location: [facility] ENDOSCOPY;  Service: Gastroenterology;  Laterality: N/A;  PRE: DYSPHAGIA, REFLUX  POST: ESOPHAGITIS, IRREGULAR Z LINE   • HYSTERECTOMY     • SHOULDER SURGERY     • SKIN CANCER EXCISION      melanoma removed from neck   • TONSILLECTOMY AND ADENOIDECTOMY           Current Outpatient Medications:   •  ALPRAZolam (XANAX) 0.5 MG tablet, Take 1 tablet by mouth 3 (Three) Times a Day As Needed for Anxiety. for anxiety, Disp: 90 tablet, Rfl: 1  •  omeprazole (priLOSEC) 20 MG capsule, take 1 capsule by MOUTH twice daily, Disp: 60 capsule, Rfl: 3  •  venlafaxine XR (EFFEXOR-XR) 75 MG 24 hr capsule, TAKE 3 CAPSULES BY MOUTH EVERY DAY, Disp: 90 capsule, Rfl: 0  •  estradiol (ESTRACE) 0.1 MG/GM vaginal cream, Insert 1 g into the vagina 3 (Three) Times a Week for 90 days., Disp: 42.5 g, Rfl: 3    Allergies   Allergen Reactions   • Percocet [Oxycodone-Acetaminophen] Nausea Only   • Morphine And Related Rash   • Sulfa Antibiotics Rash        Family History   Problem Relation Age of Onset   • Bipolar disorder Mother         No formal diagnosis   • COPD Mother    • Heart disease Mother    • Alcohol abuse Father    • Bipolar disorder Father    • Suicide Attempts Paternal Uncle    • Breast cancer Maternal Grandfather    • Esophageal cancer Maternal Grandfather    • Malig Hyperthermia Neg Hx        Social History     Socioeconomic History   • Marital status:      Spouse name: Bharath   • Number of children: 3   Tobacco Use   • Smoking status: Former     Packs/day: 0.25     Years: 30.00     Pack years: 7.50     Types: Cigarettes     Start date: 12/14/1987     Quit date: 2020     Years since quitting: 3.1   • Smokeless tobacco: Never   • Tobacco comments:     using a vape cig   Vaping Use   • Vaping Use: Every day   • Substances: Nicotine   • Devices: Pre-filled or refillable cartridge   Substance and Sexual Activity   • Alcohol use: Not  "Currently     Comment: every now and then   • Drug use: Never   • Sexual activity: Defer       Vital Signs:   Resp 15   Ht 172.7 cm (68\")   Wt 59.9 kg (132 lb)   BMI 20.07 kg/m²      Physical Exam  Genitourinary:             Result Review :   The following data was reviewed by: ABDI Tarango on 02/27/2023:  Results for orders placed or performed in visit on 02/27/23   POC Urinalysis Dipstick, Automated    Specimen: Urine   Result Value Ref Range    Color Yellow Yellow, Straw, Dark Yellow, Ruth    Clarity, UA Clear Clear    Specific Gravity  1.030 1.005 - 1.030    pH, Urine 6.5 5.0 - 8.0    Leukocytes Small (1+) (A) Negative    Nitrite, UA Negative Negative    Protein, POC Negative Negative mg/dL    Glucose, UA Negative Negative mg/dL    Ketones, UA Negative Negative    Urobilinogen, UA 0.2 E.U./dL Normal, 0.2 E.U./dL    Bilirubin Negative Negative    Blood, UA Moderate (A) Negative    Lot Number [id number]     Expiration Date 1/2,024             Procedures        Assessment and Plan    Diagnoses and all orders for this visit:    1. Dysuria (Primary)  -     POC Urinalysis Dipstick, Automated    2. History of recurrent UTIs  -     Cancel: CT Abdomen Pelvis With & Without Contrast; Future  -     estradiol (ESTRACE) 0.1 MG/GM vaginal cream; Insert 1 g into the vagina 3 (Three) Times a Week for 90 days.  Dispense: 42.5 g; Refill: 3    3. Gross hematuria  -     Cystoscopy; Future  -     CT Abdomen Pelvis With & Without Contrast; Future  -     estradiol (ESTRACE) 0.1 MG/GM vaginal cream; Insert 1 g into the vagina 3 (Three) Times a Week for 90 days.  Dispense: 42.5 g; Refill: 3    4. Vaginal discharge  -     Chlamydia trachomatis, Neisseria gonorrhoeae, PCR w/ confirmation - Swab, Vagina; Future  -     Herpes Simplex Virus (HSV) 1 & 2, NICK - ThinPrep Vial, Vulva; Future      Discussed with patient at this point in time given her history of gross hematuria and recommended upper and lower urinary tract evaluation " via [facility] I do recommend following through with that we will order a CT scan of the abdomen pelvis with and without IV contrast with delayed imaging as well as a cystoscopy to be performed in office.    Urinary tract infection-no evidence on urine dip of infection today.  Discussed with patient that chronic recurrent UTI is a common condition that is multifactorial in nature, difficult to treat, unlikely to completely irradicate, and the specific cause for recurrent infections is often unknown.     Discussed that urine cultures are critically important for the diagnosis of recurrent urinary tract infection.  Discussed that some women with history of vaginal atrophy will have concomitant genitourinary syndrome of menopause, cystitis symptoms, without bacteriuria.     Discussed that one of the best treatment and prevention medications for recurrent urinary tract infection and  syndrome of menopause is vaginal estrogen cream.  Patient tolerating Intrarosa; Premarin cream sent to pharmacy.  Specific instructions provided regarding placement of cream at the urethra and urethral meatus 3 times weekly.  Blackbox warning discussed.  Sent to pharmacy     Should antibiotic strategy for treatment and prevention of recurrent urinary tract infection be pursued, aware that trend of urine cultures needs to be performed.  She is agreeable and understands this.     Encouraged behavioral modifications including fluid management, hydration, not delaying urgency when she needs to void.  Recommend cranberry supplementation daily to aid with prevention of urinary tract infection.  Discussed over-the-counter herbal supplements for prevention of UTI including [place], pre-life, and Desert harvest.  Patient provided information on these products and encouraged to investigate further.     At this point, recommend the above and on demand abx when culture positive  Discussed the importance of antibiotic stewardship; the prevalence of  resistant bacteria; and adverse effects of prolonged antibiotic treatment including yeast infections and possible C. difficile colitis; thus would recommend continued use of on-demand antibiotics per culture sensitivities.  Provided specimen cups, patient to notify office as UTI symptoms arise so culture may be obtained.  Patient to take Pyridium while awaiting culture result and aware that antibiotic will only be ordered if urine culture positive.             I spent 15 minutes caring for Janis on this date of service. This time includes time spent by me in the following activities:reviewing tests, obtaining and/or reviewing a separately obtained history, performing a medically appropriate examination and/or evaluation , counseling and educating the patient/family/caregiver, ordering medications, tests, or procedures, and documenting information in the medical record  Follow Up   Return in about 6 weeks (around 4/10/2023) for With Dr. Dotson for Cystoscopy.  Patient was given instructions and counseling regarding her condition or for health maintenance advice. Please see specific information pulled into the AVS if appropriate.         This document has been electronically signed by ABDI Tarango  February 27, 2023 13:23 EST

## 2023-02-28 ENCOUNTER — TELEPHONE (OUTPATIENT)
Dept: UROLOGY | Facility: CLINIC | Age: 63
End: 2023-02-28
Payer: COMMERCIAL

## 2023-02-28 DIAGNOSIS — F33.9 EPISODE OF RECURRENT MAJOR DEPRESSIVE DISORDER, UNSPECIFIED DEPRESSION EPISODE SEVERITY: ICD-10-CM

## 2023-02-28 LAB
C TRACH RRNA CVX QL NAA+PROBE: NOT DETECTED
N GONORRHOEA RRNA SPEC QL NAA+PROBE: NOT DETECTED

## 2023-02-28 RX ORDER — VENLAFAXINE HYDROCHLORIDE 75 MG/1
225 CAPSULE, EXTENDED RELEASE ORAL DAILY
Qty: 270 CAPSULE | Refills: 1 | Status: SHIPPED | OUTPATIENT
Start: 2023-02-28

## 2023-02-28 NOTE — TELEPHONE ENCOUNTER
----- Message from ABDI Tarango sent at 2/28/2023  2:23 PM EST -----  Please inform of no chlamydia/gonorrhea

## 2023-02-28 NOTE — TELEPHONE ENCOUNTER
Patient called back and I informed her of test results per previous message. She voiced understanding.

## 2023-03-01 RX ORDER — FLUCONAZOLE 150 MG/1
150 TABLET ORAL ONCE
COMMUNITY

## 2023-03-02 LAB
HSV1 DNA SPEC QL NAA+PROBE: NEGATIVE
HSV2 DNA SPEC QL NAA+PROBE: NEGATIVE

## 2023-03-02 RX ORDER — VANCOMYCIN HYDROCHLORIDE 125 MG/1
125 CAPSULE ORAL
OUTPATIENT
Start: 2023-03-02

## 2023-03-02 RX ORDER — FLUCONAZOLE 150 MG/1
150 TABLET ORAL ONCE
Qty: 1 TABLET | Refills: 0 | OUTPATIENT
Start: 2023-03-02 | End: 2023-03-02

## 2023-03-06 ENCOUNTER — HOSPITAL ENCOUNTER (OUTPATIENT)
Dept: CT IMAGING | Facility: HOSPITAL | Age: 63
Discharge: HOME | End: 2023-03-06
Admitting: NURSE PRACTITIONER
Payer: COMMERCIAL

## 2023-03-06 DIAGNOSIS — R31.0 GROSS HEMATURIA: ICD-10-CM

## 2023-03-06 LAB
CREAT BLDA-MCNC: 1 MG/DL
EGFRCR SERPLBLD CKD-EPI 2021: 64.6 ML/MIN/1.73

## 2023-03-06 PROCEDURE — 74178 CT ABD&PLV WO CNTR FLWD CNTR: CPT

## 2023-03-06 PROCEDURE — 25510000001 IOPAMIDOL PER 1 ML: Performed by: NURSE PRACTITIONER

## 2023-03-06 PROCEDURE — 82565 ASSAY OF CREATININE: CPT

## 2023-03-06 RX ADMIN — IOPAMIDOL 100 ML: 755 INJECTION, SOLUTION INTRAVENOUS at 15:24

## 2023-03-07 RX ORDER — FLUCONAZOLE 150 MG/1
150 TABLET ORAL ONCE
Qty: 1 TABLET | Refills: 0 | Status: CANCELLED | OUTPATIENT
Start: 2023-03-07 | End: 2023-03-07

## 2023-03-07 RX ORDER — VANCOMYCIN HYDROCHLORIDE 125 MG/1
125 CAPSULE ORAL
Status: CANCELLED | OUTPATIENT
Start: 2023-03-07

## 2023-03-07 NOTE — TELEPHONE ENCOUNTER
I saw were you denied these medications the last time. We got a fax requesting them again. I was unsure if I needed to call the patient and advise her of anything.

## 2023-04-13 ENCOUNTER — TELEPHONE (OUTPATIENT)
Dept: UROLOGY | Facility: CLINIC | Age: 63
End: 2023-04-13
Payer: COMMERCIAL

## 2023-04-13 NOTE — TELEPHONE ENCOUNTER
CALLED PT TO RS CX APPT W/JEANNINE/PT SAID THAT SHE IS THE CAREGIVER TO HER MOM AND CAN NOT RS AT THIS TIME/ANYTHING ELSE TO DO??

## 2023-05-09 DIAGNOSIS — F41.9 ANXIETY: ICD-10-CM

## 2023-05-09 NOTE — TELEPHONE ENCOUNTER
Caller: Janis Rosa ANA    Relationship: Self    Best call back number:[phone]  Requested Prescriptions:   Requested Prescriptions     Pending Prescriptions Disp Refills   • ALPRAZolam (XANAX) 0.5 MG tablet 90 tablet 1     Sig: Take 1 tablet by mouth 3 (Three) Times a Day As Needed for Anxiety. for anxiety        Pharmacy where request should be sent:      Last office visit with prescribing clinician: 1/27/2023   Last telemedicine visit with prescribing clinician: 3/7/2023   Next office visit with prescribing clinician: Visit date not found     Additional details provided by patient: PLEASE SEND PRESCRIPTION TO [facility] IN [place] ON [address]- PHONE NUMBER [phone]  Does the patient have less than a 3 day supply:  [x] Yes  [] No    Would you like a call back once the refill request has been completed: [x] Yes [] No    If the office needs to give you a call back, can they leave a voicemail: [x] Yes [] No    Haresh Guallpa Rep   05/09/23 08:52 EDT

## 2023-05-10 DIAGNOSIS — F41.9 ANXIETY: ICD-10-CM

## 2023-05-10 RX ORDER — ALPRAZOLAM 0.5 MG/1
0.5 TABLET ORAL 3 TIMES DAILY PRN
Qty: 90 TABLET | Refills: 0 | Status: SHIPPED | OUTPATIENT
Start: 2023-05-10 | End: 2023-05-10 | Stop reason: SDUPTHER

## 2023-05-10 RX ORDER — ALPRAZOLAM 0.5 MG/1
0.5 TABLET ORAL 3 TIMES DAILY PRN
Qty: 90 TABLET | Refills: 0 | Status: SHIPPED | OUTPATIENT
Start: 2023-05-10

## 2023-06-01 ENCOUNTER — APPOINTMENT (OUTPATIENT)
Dept: CT IMAGING | Facility: HOSPITAL | Age: 63
End: 2023-06-01
Payer: COMMERCIAL

## 2023-06-01 ENCOUNTER — TELEPHONE (OUTPATIENT)
Dept: FAMILY MEDICINE CLINIC | Facility: CLINIC | Age: 63
End: 2023-06-01

## 2023-06-01 ENCOUNTER — HOSPITAL ENCOUNTER (EMERGENCY)
Facility: HOSPITAL | Age: 63
Discharge: HOME | End: 2023-06-01
Attending: EMERGENCY MEDICINE
Payer: COMMERCIAL

## 2023-06-01 VITALS
BODY MASS INDEX: 18.04 KG/M2 | RESPIRATION RATE: 15 BRPM | WEIGHT: 119 LBS | HEART RATE: 78 BPM | OXYGEN SATURATION: 98 % | DIASTOLIC BLOOD PRESSURE: 73 MMHG | TEMPERATURE: 97.8 F | HEIGHT: 68 IN | SYSTOLIC BLOOD PRESSURE: 113 MMHG

## 2023-06-01 DIAGNOSIS — R19.7 DIARRHEA, UNSPECIFIED TYPE: Primary | ICD-10-CM

## 2023-06-01 DIAGNOSIS — Z86.19 HISTORY OF CLOSTRIDIOIDES DIFFICILE COLITIS: ICD-10-CM

## 2023-06-01 DIAGNOSIS — R11.0 NAUSEA: ICD-10-CM

## 2023-06-01 DIAGNOSIS — R10.30 LOWER ABDOMINAL PAIN: ICD-10-CM

## 2023-06-01 DIAGNOSIS — R82.90 ABNORMAL URINALYSIS: ICD-10-CM

## 2023-06-01 LAB
ALBUMIN SERPL-MCNC: 4.8 G/DL (ref 3.5–5.2)
ALBUMIN/GLOB SERPL: 1.7 G/DL
ALP SERPL-CCNC: 104 U/L (ref 39–117)
ALT SERPL W P-5'-P-CCNC: 15 U/L (ref 1–33)
ANION GAP SERPL CALCULATED.3IONS-SCNC: 13 MMOL/L (ref 5–15)
AST SERPL-CCNC: 24 U/L (ref 1–32)
BACTERIA UR QL AUTO: ABNORMAL /HPF
BASOPHILS # BLD AUTO: 0.04 10*3/MM3 (ref 0–0.2)
BASOPHILS NFR BLD AUTO: 0.4 % (ref 0–1.5)
BILIRUB SERPL-MCNC: 0.7 MG/DL (ref 0–1.2)
BILIRUB UR QL STRIP: NEGATIVE
BUN SERPL-MCNC: 11 MG/DL (ref 8–23)
BUN/CREAT SERPL: 14.1 (ref 7–25)
CALCIUM SPEC-SCNC: 9.7 MG/DL (ref 8.6–10.5)
CHLORIDE SERPL-SCNC: 98 MMOL/L (ref 98–107)
CLARITY UR: CLEAR
CO2 SERPL-SCNC: 29 MMOL/L (ref 22–29)
COLOR UR: YELLOW
CREAT SERPL-MCNC: 0.78 MG/DL (ref 0.57–1)
DEPRECATED RDW RBC AUTO: 38.9 FL (ref 37–54)
EGFRCR SERPLBLD CKD-EPI 2021: 87.1 ML/MIN/1.73
EOSINOPHIL # BLD AUTO: 0.03 10*3/MM3 (ref 0–0.4)
EOSINOPHIL NFR BLD AUTO: 0.3 % (ref 0.3–6.2)
ERYTHROCYTE [DISTWIDTH] IN BLOOD BY AUTOMATED COUNT: 12.1 % (ref 12.3–15.4)
GLOBULIN UR ELPH-MCNC: 2.9 GM/DL
GLUCOSE SERPL-MCNC: 159 MG/DL (ref 65–99)
GLUCOSE UR STRIP-MCNC: NEGATIVE MG/DL
HCT VFR BLD AUTO: 45 % (ref 34–46.6)
HGB BLD-MCNC: 14.8 G/DL (ref 12–15.9)
HGB UR QL STRIP.AUTO: ABNORMAL
HOLD SPECIMEN: NORMAL
HOLD SPECIMEN: NORMAL
HYALINE CASTS UR QL AUTO: ABNORMAL /LPF
IMM GRANULOCYTES # BLD AUTO: 0.03 10*3/MM3 (ref 0–0.05)
IMM GRANULOCYTES NFR BLD AUTO: 0.3 % (ref 0–0.5)
KETONES UR QL STRIP: NEGATIVE
LEUKOCYTE ESTERASE UR QL STRIP.AUTO: ABNORMAL
LIPASE SERPL-CCNC: 30 U/L (ref 13–60)
LYMPHOCYTES # BLD AUTO: 2.04 10*3/MM3 (ref 0.7–3.1)
LYMPHOCYTES NFR BLD AUTO: 20.4 % (ref 19.6–45.3)
MAGNESIUM SERPL-MCNC: 2.3 MG/DL (ref 1.6–2.4)
MCH RBC QN AUTO: 28.8 PG (ref 26.6–33)
MCHC RBC AUTO-ENTMCNC: 32.9 G/DL (ref 31.5–35.7)
MCV RBC AUTO: 87.5 FL (ref 79–97)
MONOCYTES # BLD AUTO: 0.78 10*3/MM3 (ref 0.1–0.9)
MONOCYTES NFR BLD AUTO: 7.8 % (ref 5–12)
NEUTROPHILS NFR BLD AUTO: 7.09 10*3/MM3 (ref 1.7–7)
NEUTROPHILS NFR BLD AUTO: 70.8 % (ref 42.7–76)
NITRITE UR QL STRIP: NEGATIVE
NRBC BLD AUTO-RTO: 0 /100 WBC (ref 0–0.2)
PH UR STRIP.AUTO: 7.5 [PH] (ref 5–8)
PLATELET # BLD AUTO: 367 10*3/MM3 (ref 140–450)
PMV BLD AUTO: 9.3 FL (ref 6–12)
POTASSIUM SERPL-SCNC: 3.6 MMOL/L (ref 3.5–5.2)
PROT SERPL-MCNC: 7.7 G/DL (ref 6–8.5)
PROT UR QL STRIP: NEGATIVE
RBC # BLD AUTO: 5.14 10*6/MM3 (ref 3.77–5.28)
RBC # UR STRIP: ABNORMAL /HPF
REF LAB TEST METHOD: ABNORMAL
SODIUM SERPL-SCNC: 140 MMOL/L (ref 136–145)
SP GR UR STRIP: 1.01 (ref 1–1.03)
SQUAMOUS #/AREA URNS HPF: ABNORMAL /HPF
TROPONIN T SERPL HS-MCNC: 10 NG/L
UROBILINOGEN UR QL STRIP: ABNORMAL
WBC # UR STRIP: ABNORMAL /HPF
WBC NRBC COR # BLD: 10.01 10*3/MM3 (ref 3.4–10.8)
WHOLE BLOOD HOLD COAG: NORMAL
WHOLE BLOOD HOLD SPECIMEN: NORMAL

## 2023-06-01 PROCEDURE — 83735 ASSAY OF MAGNESIUM: CPT | Performed by: EMERGENCY MEDICINE

## 2023-06-01 PROCEDURE — 93005 ELECTROCARDIOGRAM TRACING: CPT | Performed by: EMERGENCY MEDICINE

## 2023-06-01 PROCEDURE — 74177 CT ABD & PELVIS W/CONTRAST: CPT

## 2023-06-01 PROCEDURE — 25510000001 IOPAMIDOL 61 % SOLUTION: Performed by: EMERGENCY MEDICINE

## 2023-06-01 PROCEDURE — 99284 EMERGENCY DEPT VISIT MOD MDM: CPT

## 2023-06-01 PROCEDURE — 81001 URINALYSIS AUTO W/SCOPE: CPT | Performed by: EMERGENCY MEDICINE

## 2023-06-01 PROCEDURE — 80053 COMPREHEN METABOLIC PANEL: CPT

## 2023-06-01 PROCEDURE — 36415 COLL VENOUS BLD VENIPUNCTURE: CPT

## 2023-06-01 PROCEDURE — 96375 TX/PRO/DX INJ NEW DRUG ADDON: CPT

## 2023-06-01 PROCEDURE — 83690 ASSAY OF LIPASE: CPT

## 2023-06-01 PROCEDURE — 87086 URINE CULTURE/COLONY COUNT: CPT | Performed by: EMERGENCY MEDICINE

## 2023-06-01 PROCEDURE — 84484 ASSAY OF TROPONIN QUANT: CPT

## 2023-06-01 PROCEDURE — 85025 COMPLETE CBC W/AUTO DIFF WBC: CPT

## 2023-06-01 PROCEDURE — 25010000002 KETOROLAC TROMETHAMINE PER 15 MG: Performed by: EMERGENCY MEDICINE

## 2023-06-01 PROCEDURE — 96374 THER/PROPH/DIAG INJ IV PUSH: CPT

## 2023-06-01 PROCEDURE — 25010000002 ONDANSETRON PER 1 MG: Performed by: EMERGENCY MEDICINE

## 2023-06-01 PROCEDURE — 96361 HYDRATE IV INFUSION ADD-ON: CPT

## 2023-06-01 RX ORDER — SODIUM CHLORIDE 0.9 % (FLUSH) 0.9 %
10 SYRINGE (ML) INJECTION AS NEEDED
Status: DISCONTINUED | OUTPATIENT
Start: 2023-06-01 | End: 2023-06-02 | Stop reason: HOSPADM

## 2023-06-01 RX ORDER — ONDANSETRON 2 MG/ML
8 INJECTION INTRAMUSCULAR; INTRAVENOUS ONCE AS NEEDED
Status: COMPLETED | OUTPATIENT
Start: 2023-06-01 | End: 2023-06-01

## 2023-06-01 RX ORDER — ONDANSETRON 4 MG/1
4 TABLET, ORALLY DISINTEGRATING ORAL EVERY 8 HOURS PRN
Qty: 15 TABLET | Refills: 0 | Status: SHIPPED | OUTPATIENT
Start: 2023-06-01

## 2023-06-01 RX ORDER — KETOROLAC TROMETHAMINE 15 MG/ML
15 INJECTION, SOLUTION INTRAMUSCULAR; INTRAVENOUS ONCE
Status: COMPLETED | OUTPATIENT
Start: 2023-06-01 | End: 2023-06-01

## 2023-06-01 RX ORDER — METRONIDAZOLE 500 MG/1
500 TABLET ORAL 3 TIMES DAILY
Qty: 30 TABLET | Refills: 0 | Status: SHIPPED | OUTPATIENT
Start: 2023-06-01 | End: 2023-06-11

## 2023-06-01 RX ADMIN — IOPAMIDOL 85 ML: 612 INJECTION, SOLUTION INTRAVENOUS at 17:56

## 2023-06-01 RX ADMIN — SODIUM CHLORIDE 1000 ML: 9 INJECTION, SOLUTION INTRAVENOUS at 15:43

## 2023-06-01 RX ADMIN — ONDANSETRON 8 MG: 2 INJECTION INTRAMUSCULAR; INTRAVENOUS at 15:50

## 2023-06-01 RX ADMIN — KETOROLAC TROMETHAMINE 15 MG: 15 INJECTION, SOLUTION INTRAMUSCULAR; INTRAVENOUS at 15:43

## 2023-06-01 NOTE — TELEPHONE ENCOUNTER
Patient stated that she did not have not see her infectious disease doctor again. Patient stated that she is going to go to the ER to be seen. -KL

## 2023-06-01 NOTE — TELEPHONE ENCOUNTER
Caller: Janis Rosa    Relationship: Self    Best call back number: [phone]    What is the best time to reach you: ANY     Who are you requesting to speak with (clinical staff, provider,  specific staff member): CLINICAL STAFF     What was the call regarding: PATIENT REQUESTING A URGENT CALL BACK, SHE THINKS SHE MAY HAVE C-DIFF AGAIN.

## 2023-06-01 NOTE — ED PROVIDER NOTES
EMERGENCY DEPARTMENT ENCOUNTER  I wore full protective equipment throughout this patient encounter including a N95 mask, eye shield, gown and gloves. Hand hygiene was performed before donning protective equipment and after removal when leaving the room.    Room Number:  13/13  Date of encounter:  6/1/2023  PCP: Kim Galindo APRN  Patient Care Team:  Kim Galindo APRN as PCP - General (Family Medicine)  Conrad Gomes MD (Otolaryngology)  Suraj Regan APRN as Nurse Practitioner (Psychiatry)     HPI:  Context: Janis Rosa is a 62 y.o. female who presents to the ED c/o chief complaint of diarrhea concern for C. difficile.  Patient reports history of C. difficile colitis twice in the past.  Patient reports 3 days of diarrhea, diarrhea is predominantly mucus.  Patient complains of lower abdominal pain, dull and nagging, constant.  Patient denies any aggravating or ameliorating factors, no radiation.  Patient reports nausea, denies any emesis.  Patient denies any fever shakes or chills, does endorse night sweats.  Patient reports concern for C. difficile colitis, denies any recent antibiotics, no recent travel.  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 difficile recently and was negative.    MEDICAL HISTORY REVIEW  Reviewed in EPIC    PAST MEDICAL HISTORY  Active Ambulatory Problems     Diagnosis Date Noted   • Rectal bleeding 12/14/2017   • Lower abdominal pain 12/14/2017   • Diverticulitis of large intestine without perforation or abscess without bleeding 02/08/2018   • Diverticulitis large intestine 03/19/2018   • Primary osteoarthritis of right knee 03/19/2020   • Candida, oral 02/01/2021   • Dysuria 02/01/2021   • Gastroesophageal reflux disease 04/01/2021   • Esophageal dysphagia 04/01/2021   • Breast cancer 07/15/2021   • Endometrial cancer 07/15/2021   • Myelitis 07/15/2021   • Major depressive disorder, recurrent  episode, moderate 03/31/2022   • Generalized anxiety disorder with panic attacks 04/14/2022   • Family conflict 04/14/2022     Resolved Ambulatory Problems     Diagnosis Date Noted   • No Resolved Ambulatory Problems     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   • Depression    • Diverticulitis    • Epigastric pain    • Hoarse voice quality    • Melanoma    • Panic attack    • Screening for alcoholism 4/14/2022   • Transverse myelitis        PAST SURGICAL HISTORY  Past Surgical History:   Procedure Laterality Date   • APPENDECTOMY     • BREAST BIOPSY Right 03/02/2011    right breast stereotactic biopsy   • BREAST EXCISIONAL BIOPSY Right 03/11/2011    Excision biopsy, right breast with mammogram needle localization-Dr. Robert Hurley   • COLON RESECTION N/A 3/19/2018    Procedure: LAPAROSCOPIC SIGMOID COLON RESECTION;  Surgeon: Jaycob Anthony MD;  Location: [facility] MAIN OR;  Service: General   • COLONOSCOPY N/A 1/17/2018    Procedure: FLEXIBLE SIGMOIDOSCPY TO 50 CM;  Surgeon: Jaycob Anthony MD;  Location: [facility] ENDOSCOPY;  Service:    • ENDOSCOPY N/A 4/19/2021    Procedure: ESOPHAGOGASTRODUODENOSCOPY WITH COLD BIOPSIES;  Surgeon: Cali Hall MD;  Location: [facility] ENDOSCOPY;  Service: Gastroenterology;  Laterality: N/A;  PRE: DYSPHAGIA, REFLUX  POST: ESOPHAGITIS, IRREGULAR Z LINE   • HYSTERECTOMY     • SHOULDER SURGERY     • SKIN CANCER EXCISION      melanoma removed from neck   • TONSILLECTOMY AND ADENOIDECTOMY         FAMILY HISTORY  Family History   Problem Relation Age of Onset   • Bipolar disorder Mother         No formal diagnosis   • COPD Mother    • Heart disease Mother    • Alcohol abuse Father    • Bipolar disorder Father    • Suicide Attempts Paternal Uncle    • Breast cancer Maternal Grandfather    • Esophageal cancer Maternal Grandfather    • Malig Hyperthermia Neg Hx        SOCIAL HISTORY  Social History     Socioeconomic  History   • Marital status:      Spouse name: Bharath   • Number of children: 3   Tobacco Use   • Smoking status: Former     Packs/day: 0.25     Years: 30.00     Pack years: 7.50     Types: Cigarettes     Start date: 12/14/1987     Quit date: 2020     Years since quitting: 3.4   • Smokeless tobacco: Never   • Tobacco comments:     using a vape cig   Vaping Use   • Vaping Use: Every day   • Substances: Nicotine   • Devices: Pre-filled or refillable cartridge   Substance and Sexual Activity   • Alcohol use: Not Currently     Comment: every now and then   • Drug use: Never   • Sexual activity: Defer       ALLERGIES  Percocet [oxycodone-acetaminophen], Morphine and related, and Sulfa antibiotics    The patient's allergies have been reviewed    REVIEW OF SYSTEMS  All systems reviewed and negative except for those discussed in HPI.     PHYSICAL EXAM  I have reviewed the triage vital signs and nursing notes.  ED Triage Vitals [06/01/23 1331]   Temp Heart Rate Resp BP SpO2   97.8 °F (36.6 °C) 118 16 114/76 96 %      Temp src Heart Rate Source Patient Position BP Location FiO2 (%)   -- -- -- -- --       General: No acute distress.  HENT: NCAT, PERRL, Nares patent.  Eyes: no scleral icterus.  Neck: trachea midline, no ROM limitations.  CV: regular rhythm, regular rate.  Respiratory: normal effort, CTAB.  Abdomen: soft, nondistended, lower abdominal tenderness to palpation, no rebound tenderness, no guarding or rigidity.  Musculoskeletal: no deformity.  Neuro: alert, moves all extremities, follows commands.  Skin: warm, dry.    LAB RESULTS  Recent Results (from the past 24 hour(s))   Comprehensive Metabolic Panel    Collection Time: 06/01/23  1:39 PM    Specimen: Blood   Result Value Ref Range    Glucose 159 (H) 65 - 99 mg/dL    BUN 11 8 - 23 mg/dL    Creatinine 0.78 0.57 - 1.00 mg/dL    Sodium 140 136 - 145 mmol/L    Potassium 3.6 3.5 - 5.2 mmol/L    Chloride 98 98 - 107 mmol/L    CO2 29.0 22.0 - 29.0 mmol/L    Calcium  9.7 8.6 - 10.5 mg/dL    Total Protein 7.7 6.0 - 8.5 g/dL    Albumin 4.8 3.5 - 5.2 g/dL    ALT (SGPT) 15 1 - 33 U/L    AST (SGOT) 24 1 - 32 U/L    Alkaline Phosphatase 104 39 - 117 U/L    Total Bilirubin 0.7 0.0 - 1.2 mg/dL    Globulin 2.9 gm/dL    A/G Ratio 1.7 g/dL    BUN/Creatinine Ratio 14.1 7.0 - 25.0    Anion Gap 13.0 5.0 - 15.0 mmol/L    eGFR 87.1 >60.0 mL/min/1.73   Lipase    Collection Time: 06/01/23  1:39 PM    Specimen: Blood   Result Value Ref Range    Lipase 30 13 - 60 U/L   Single High Sensitivity Troponin T    Collection Time: 06/01/23  1:39 PM    Specimen: Blood   Result Value Ref Range    HS Troponin T 10 (H) <10 ng/L   Green Top (Gel)    Collection Time: 06/01/23  1:39 PM   Result Value Ref Range    Extra Tube Hold for add-ons.    Lavender Top    Collection Time: 06/01/23  1:39 PM   Result Value Ref Range    Extra Tube hold for add-on    Gold Top - SST    Collection Time: 06/01/23  1:39 PM   Result Value Ref Range    Extra Tube Hold for add-ons.    Light Blue Top    Collection Time: 06/01/23  1:39 PM   Result Value Ref Range    Extra Tube Hold for add-ons.    CBC Auto Differential    Collection Time: 06/01/23  1:39 PM    Specimen: Blood   Result Value Ref Range    WBC 10.01 3.40 - 10.80 10*3/mm3    RBC 5.14 3.77 - 5.28 10*6/mm3    Hemoglobin 14.8 12.0 - 15.9 g/dL    Hematocrit 45.0 34.0 - 46.6 %    MCV 87.5 79.0 - 97.0 fL    MCH 28.8 26.6 - 33.0 pg    MCHC 32.9 31.5 - 35.7 g/dL    RDW 12.1 (L) 12.3 - 15.4 %    RDW-SD 38.9 37.0 - 54.0 fl    MPV 9.3 6.0 - 12.0 fL    Platelets 367 140 - 450 10*3/mm3    Neutrophil % 70.8 42.7 - 76.0 %    Lymphocyte % 20.4 19.6 - 45.3 %    Monocyte % 7.8 5.0 - 12.0 %    Eosinophil % 0.3 0.3 - 6.2 %    Basophil % 0.4 0.0 - 1.5 %    Immature Grans % 0.3 0.0 - 0.5 %    Neutrophils, Absolute 7.09 (H) 1.70 - 7.00 10*3/mm3    Lymphocytes, Absolute 2.04 0.70 - 3.10 10*3/mm3    Monocytes, Absolute 0.78 0.10 - 0.90 10*3/mm3    Eosinophils, Absolute 0.03 0.00 - 0.40 10*3/mm3     Basophils, Absolute 0.04 0.00 - 0.20 10*3/mm3    Immature Grans, Absolute 0.03 0.00 - 0.05 10*3/mm3    nRBC 0.0 0.0 - 0.2 /100 WBC   Magnesium    Collection Time: 06/01/23  1:39 PM    Specimen: Blood   Result Value Ref Range    Magnesium 2.3 1.6 - 2.4 mg/dL   ECG 12 Lead ED Triage Standing Order; Abdominal Pain (Upper)    Collection Time: 06/01/23  3:12 PM   Result Value Ref Range    QT Interval 387 ms   Urinalysis With Microscopic If Indicated (No Culture) - Urine, Clean Catch    Collection Time: 06/01/23  3:22 PM    Specimen: Urine, Clean Catch   Result Value Ref Range    Color, UA Yellow Yellow, Straw    Appearance, UA Clear Clear    pH, UA 7.5 5.0 - 8.0    Specific Gravity, UA 1.010 1.005 - 1.030    Glucose, UA Negative Negative    Ketones, UA Negative Negative    Bilirubin, UA Negative Negative    Blood, UA Small (1+) (A) Negative    Protein, UA Negative Negative    Leuk Esterase, UA Large (3+) (A) Negative    Nitrite, UA Negative Negative    Urobilinogen, UA 1.0 E.U./dL 0.2 - 1.0 E.U./dL   Urinalysis, Microscopic Only - Urine, Clean Catch    Collection Time: 06/01/23  3:22 PM    Specimen: Urine, Clean Catch   Result Value Ref Range    RBC, UA 3-5 (A) None Seen, 0-2 /HPF    WBC, UA Too Numerous to Count (A) None Seen, 0-2 /HPF    Bacteria, UA 1+ (A) None Seen /HPF    Squamous Epithelial Cells, UA 3-6 (A) None Seen, 0-2 /HPF    Hyaline Casts, UA 3-6 None Seen /LPF    Methodology Automated Microscopy        I ordered the above labs and reviewed the results.    RADIOLOGY  CT Abdomen Pelvis With Contrast    Result Date: 6/1/2023  CT ABDOMEN PELVIS W CONTRAST-  INDICATIONS: Lower abdominal pain with diarrhea nausea  TECHNIQUE: Radiation dose reduction techniques were utilized, including automated exposure control and exposure modulation based on body size. ABDOMEN AND PELVIS CT  COMPARISON: 08/03/2022  FINDINGS:  A hyperdense lesion in the right hepatic lobe, 1.7 cm on axial image 18 is nonspecific, basis of this  exam, but corresponds in size and location to a hemangioma described on the report from the CT from 03/06/2023, also appear stable in size from 08/03/2022. Likely focal fat is seen anteriorly in the left hepatic lobe.  Bilateral renal low densities are seen that are too small to characterize.  Stable nonspecific nodular thickening of the left adrenal gland.  Otherwise unremarkable appearance of the liver, gallbladder, spleen, adrenal glands, pancreas, kidneys, bladder.  No bowel obstruction or abnormal bowel thickening is identified. Surgical change of the sigmoid colon is noted. No appendix is noted, compatible with stated history of prior appendectomy. Mild colonic fecal retention is apparent.  No free intraperitoneal gas or free fluid.  Scattered small mesenteric and para-aortic lymph nodes are seen that are not significant by size criteria.  Abdominal aorta is not aneurysmal. Aortic and other arterial calcifications are present.  The lung bases show mild atelectasis, old granulomatous disease.  Degenerative changes are seen in the spine. No acute fracture is identified.         1. No focal acute inflammatory process of bowel is identified, follow up as indications persist. 2. No obstructive uropathy.  This report was finalized on 6/1/2023 6:19 PM by Dr. Wili Horvath M.D.        I ordered the above noted radiological studies. I reviewed the images and results. I agree with the radiologist interpretation.    PROCEDURES  Procedures    MEDICATIONS GIVEN IN ER  Medications   sodium chloride 0.9 % flush 10 mL (has no administration in time range)   sodium chloride 0.9 % bolus 1,000 mL (1,000 mL Intravenous Not Given 6/1/23 1830)   sodium chloride 0.9 % bolus 1,000 mL (0 mL Intravenous Stopped 6/1/23 1742)   ketorolac (TORADOL) injection 15 mg (15 mg Intravenous Given 6/1/23 1543)   ondansetron (ZOFRAN) injection 8 mg (8 mg Intravenous Given 6/1/23 1550)   iopamidol (ISOVUE-300) 61 % injection 100 mL (85 mL  Intravenous Given by Other 6/1/23 1756)       PROGRESS, DATA ANALYSIS, CONSULTS, AND MEDICAL DECISION MAKING  A complete history and physical exam have been performed.  All available laboratory and imaging results have been reviewed by myself prior to disposition.    MDM  After the initial H&P, I discussed pertinent information from history and physical exam with patient/family.  Discussed differential diagnosis.  Discussed plan for ED evaluation/workup/treatment.  All questions answered.  Patient/family is agreeable with plan.  ED Course as of 06/01/23 2202   Thu Jun 01, 2023   1515 EKG independently viewed and contemporaneously interpreted by ED physician. Time: 1512.  Rate 78.  Interpretation: Normal sinus rhythm, normal axis, normal QRS, no acute ST changes. [JG]   1524 Medical history reviewed and significant for: Patient has a history of C. difficile colitis which she saw infectious disease for, I reviewed their office visit note from last year.  At that time patient was improving after antibiotic taper for C. difficile colitis. [JG]   1810 I reviewed CT imaging in PACS, no bowel obstructions, no signs of inflammation per my read.  Pending official read by radiology. [JG]   2150 Patient afebrile, vital signs stable, initially tachycardic, tachycardia resolved.  No leukocytosis or left shift, patient is not dehydrated, no electrolyte disturbances.  Glucose mildly elevated, no signs of DKA.  Urinalysis is concerning for possible urinary tract infection.  CT imaging is negative for acute pathology.  Patient was unable to give stool sample here. [JG]   2158 Patient reassessed, discussed ED work-up and results.  Patient denies any urinary symptoms.  Discussed abnormal urinalysis, discussed plan to send urine for culture but no antibiotics at this time.  Patient reports that she is unable give a stool sample.  I offered to empirically treat her with Flagyl, after extensive discussion of risk and benefits, patient  agreed.  Discussed need for close follow-up with primary care, patient reports that she is followed by infectious disease, I reports it is fine to follow-up with them as well but patient should take a stool specimen for testing as soon as possible.  Patient agreeable with plan, no questions or concerns, given extensive discussion return precautions, discharging. [JG]      ED Course User Index  [JG] Rhys Bonilla MD       AS OF 22:02 EDT VITALS:    BP - 105/80  HR - 91  TEMP - 97.8 °F (36.6 °C)  O2 SATS - 98%    DIAGNOSIS  Final diagnoses:   Diarrhea, unspecified type   Lower abdominal pain   Nausea   History of Clostridioides difficile colitis   Abnormal urinalysis         DISPOSITION  DISCHARGE    Patient discharged in stable condition.    Reviewed implications of results, diagnosis, meds, responsibility to follow up, warning signs and symptoms of possible worsening, potential complications and reasons to return to ER.    Patient/Family voiced understanding of above instructions.    Discussed plan for discharge, as there is no emergent indication for admission. Patient referred to primary care provider for BP management due to today's BP. Pt/family is agreeable and understands need for follow up and repeat testing.  Pt is aware that discharge does not mean that nothing is wrong but it indicates no emergency is present that requires admission and they must continue care with follow-up as given below or physician of their choice.     FOLLOW-UP  Kim Galindo, APRN  [address] [facility] [address] [phone]    Schedule an appointment as soon as possible for a visit in 2 days  even if well    your infectious disease doctor    Schedule an appointment as soon as possible for a visit in 2 days           Medication List      New Prescriptions    metroNIDAZOLE 500 MG tablet  Commonly known as: FLAGYL  Take 1 tablet by mouth 3 (Three) Times a Day for 10 days.     ondansetron ODT 4 MG disintegrating  tablet  Commonly known as: ZOFRAN-ODT  Place 1 tablet on the tongue Every 8 (Eight) Hours As Needed for Nausea or Vomiting (nausea/vomiting).           Where to Get Your Medications      These medications were sent to [facility] [place], [place] - [address] [facility] - [phone]  - [phone] [address] [address]    Phone: [phone]   · metroNIDAZOLE 500 MG tablet  · ondansetron ODT 4 MG disintegrating tablet          Rhys Bonilla MD  06/01/23 0275

## 2023-06-02 LAB — QT INTERVAL: 387 MS

## 2023-06-03 LAB — BACTERIA SPEC AEROBE CULT: NORMAL

## 2023-06-06 ENCOUNTER — OFFICE VISIT (OUTPATIENT)
Dept: FAMILY MEDICINE CLINIC | Facility: CLINIC | Age: 63
End: 2023-06-06
Payer: COMMERCIAL

## 2023-06-06 VITALS
HEIGHT: 68 IN | TEMPERATURE: 97.5 F | HEART RATE: 112 BPM | WEIGHT: 119 LBS | SYSTOLIC BLOOD PRESSURE: 108 MMHG | BODY MASS INDEX: 18.04 KG/M2 | OXYGEN SATURATION: 99 % | DIASTOLIC BLOOD PRESSURE: 62 MMHG

## 2023-06-06 DIAGNOSIS — R05.9 COUGH, UNSPECIFIED TYPE: ICD-10-CM

## 2023-06-06 DIAGNOSIS — J02.9 SORE THROAT: Primary | ICD-10-CM

## 2023-06-06 DIAGNOSIS — R30.9 PAINFUL URINATION: ICD-10-CM

## 2023-06-06 DIAGNOSIS — R82.90 ABNORMAL URINE: ICD-10-CM

## 2023-06-06 LAB
BILIRUB BLD-MCNC: ABNORMAL MG/DL
CLARITY, POC: ABNORMAL
COLOR UR: YELLOW
EXPIRATION DATE: ABNORMAL
EXPIRATION DATE: NORMAL
EXPIRATION DATE: NORMAL
FLUAV AG UPPER RESP QL IA.RAPID: NOT DETECTED
FLUBV AG UPPER RESP QL IA.RAPID: NOT DETECTED
GLUCOSE UR STRIP-MCNC: NEGATIVE MG/DL
INTERNAL CONTROL: NORMAL
INTERNAL CONTROL: NORMAL
KETONES UR QL: ABNORMAL
LEUKOCYTE EST, POC: ABNORMAL
Lab: ABNORMAL
Lab: NORMAL
Lab: NORMAL
NITRITE UR-MCNC: NEGATIVE MG/ML
PH UR: 6 [PH] (ref 5–8)
PROT UR STRIP-MCNC: ABNORMAL MG/DL
RBC # UR STRIP: ABNORMAL /UL
S PYO AG THROAT QL: NEGATIVE
SARS-COV-2 AG UPPER RESP QL IA.RAPID: NOT DETECTED
SP GR UR: 1.03 (ref 1–1.03)
UROBILINOGEN UR QL: ABNORMAL

## 2023-06-06 PROCEDURE — 87428 SARSCOV & INF VIR A&B AG IA: CPT | Performed by: NURSE PRACTITIONER

## 2023-06-06 PROCEDURE — 99214 OFFICE O/P EST MOD 30 MIN: CPT | Performed by: NURSE PRACTITIONER

## 2023-06-06 PROCEDURE — 81003 URINALYSIS AUTO W/O SCOPE: CPT | Performed by: NURSE PRACTITIONER

## 2023-06-06 PROCEDURE — 87880 STREP A ASSAY W/OPTIC: CPT | Performed by: NURSE PRACTITIONER

## 2023-06-06 RX ORDER — BENZONATATE 100 MG/1
200 CAPSULE ORAL 3 TIMES DAILY PRN
Qty: 90 CAPSULE | Refills: 0 | Status: SHIPPED | OUTPATIENT
Start: 2023-06-06

## 2023-06-06 RX ORDER — FLUTICASONE PROPIONATE 50 MCG
2 SPRAY, SUSPENSION (ML) NASAL DAILY
Qty: 11.1 ML | Refills: 1 | Status: SHIPPED | OUTPATIENT
Start: 2023-06-06

## 2023-06-06 RX ORDER — CLINDAMYCIN PHOSPHATE 11.9 MG/ML
SOLUTION TOPICAL
COMMUNITY
Start: 2023-06-02

## 2023-06-06 RX ORDER — CETIRIZINE HYDROCHLORIDE 10 MG/1
10 TABLET ORAL DAILY
Qty: 30 TABLET | Refills: 0 | Status: SHIPPED | OUTPATIENT
Start: 2023-06-06

## 2023-06-06 RX ORDER — METHYLPREDNISOLONE 4 MG/1
TABLET ORAL
Qty: 21 TABLET | Refills: 0 | Status: SHIPPED | OUTPATIENT
Start: 2023-06-06 | End: 2023-06-19

## 2023-06-06 NOTE — PROGRESS NOTES
"Chief Complaint  Cough, Nasal Congestion, and Sore Throat    Subjective        Janis Rosa presents to [facility] PRIMARY [facility]  History of Present Illness  This is a 63 yo female patient here today for nasal congestion and ER visit. Patient went to ER last week due to diarrhea/mucus. She has history of cdiff and was worried and went for evaluation. She also had lower abd pain as well. She was evaluated and treated with flagyl due to questionable cdiff. Since starting the medication stool has improved. She has not yet been able to obtain a stool sample. She has seen infectious disease in the past for cdiff. She has also has history of UTI and is following urology. She states there are plans for a cystoscopy but had to reschedule due to taking care of her sick mom. She continues to have abd discomfort. She denies any hematuria or blood in her stool.     Since discharge from ER she states she is now dealing with sinus congestion, sore throat and productive cough. She has not taken any meds at home. She denies fever/chills or shortness of breath.        Objective   Vital Signs:  /62   Pulse 112   Temp 97.5 °F (36.4 °C)   Ht 172.7 cm (67.99\")   Wt 54 kg (119 lb)   SpO2 99%   BMI 18.10 kg/m²   Estimated body mass index is 18.1 kg/m² as calculated from the following:    Height as of this encounter: 172.7 cm (67.99\").    Weight as of this encounter: 54 kg (119 lb).             Physical Exam  Vitals and nursing note reviewed.   Constitutional:       Appearance: Normal appearance.   HENT:      Head: Normocephalic.      Right Ear: Tympanic membrane normal.      Left Ear: Tympanic membrane normal.      Nose: Congestion present.      Mouth/Throat:      Mouth: Mucous membranes are moist.      Pharynx: No oropharyngeal exudate or posterior oropharyngeal erythema.   Cardiovascular:      Rate and Rhythm: Normal rate and regular rhythm.      Pulses: Normal pulses.      Heart sounds: Normal heart " sounds.   Pulmonary:      Effort: Pulmonary effort is normal.      Breath sounds: Normal breath sounds.   Abdominal:      General: Bowel sounds are normal.      Palpations: Abdomen is soft.      Tenderness: There is no right CVA tenderness or left CVA tenderness.   Musculoskeletal:      Cervical back: Neck supple.   Lymphadenopathy:      Cervical: No cervical adenopathy.   Skin:     General: Skin is warm and dry.   Neurological:      Mental Status: She is alert.        Result Review :                   Assessment and Plan   Diagnoses and all orders for this visit:    1. Sore throat (Primary)  -     POCT SARS-CoV-2 Antigen DARCI + Flu  -     POCT rapid strep A    2. Cough, unspecified type  -     POCT SARS-CoV-2 Antigen DARCI + Flu  -     POCT rapid strep A    3. Painful urination  -     POCT urinalysis dipstick, automated    Other orders  -     cetirizine (zyrTEC) 10 MG tablet; Take 1 tablet by mouth Daily.  Dispense: 30 tablet; Refill: 0  -     fluticasone (FLONASE) 50 MCG/ACT nasal spray; 2 sprays into the nostril(s) as directed by provider Daily.  Dispense: 11.1 mL; Refill: 1  -     methylPREDNISolone (MEDROL) 4 MG dose pack; Take as directed on package instructions.  Dispense: 21 tablet; Refill: 0  -     benzonatate (Tessalon Perles) 100 MG capsule; Take 2 capsules by mouth 3 (Three) Times a Day As Needed for Cough.  Dispense: 90 capsule; Refill: 0    covid, strept and flu are all negative, I will give her steroid pack, flonase and zyrtec  She will take tessalon pearls for cough  I will check urine and have asked her to follow up with her specialist.  She continues to take xanax for anxiety. Since her mom has been sick her anxiety has increased and she is struggling with her anxiety. She once went to therapy and saw psychiatry with [facility] but has not followed up in awhile.   We discussed this in length and she will try to get back in with therapist. She did struggle getting to [place] so we did discuss  switching to [facility].     I spent a total of 30  minutes with the patient today including face-to-face encounter, reviewing data in the system, coordination of care with the nursing staff as well as consultants, documentation and entering orders.             Follow Up   No follow-ups on file.  Patient was given instructions and counseling regarding her condition or for health maintenance advice. Please see specific information pulled into the AVS if appropriate.

## 2023-06-13 LAB
BACTERIA UR CULT: ABNORMAL
BACTERIA UR CULT: ABNORMAL
OTHER ANTIBIOTIC SUSC ISLT: ABNORMAL

## 2023-06-13 NOTE — PROGRESS NOTES
Urine was checked in our office. Recent ER visit due to diarrhea started on flagyl. Shes seeing you for hx UTI, I didn't want to treat without getting your ok. Some abd discomfort that has been ongoing. No urinary symptoms.

## 2023-06-14 NOTE — PROGRESS NOTES
Thank you for reaching out. I think a course of cipro with this would help her UTI as well if you don't mind to treat.

## 2023-06-16 DIAGNOSIS — F41.9 ANXIETY: ICD-10-CM

## 2023-06-16 NOTE — TELEPHONE ENCOUNTER
Caller: Janis Rosa ANA    Relationship: Self    Best call back number: [phone]    Requested Prescriptions:   Requested Prescriptions     Pending Prescriptions Disp Refills    ALPRAZolam (XANAX) 0.5 MG tablet 90 tablet 0     Sig: Take 1 tablet by mouth 3 (Three) Times a Day As Needed for Anxiety. for anxiety        Pharmacy where request should be sent: [id number] - [place], [place] - [address] - [phone]  - [phone] FX     Last office visit with prescribing clinician: 6/6/2023   Last telemedicine visit with prescribing clinician: Visit date not found   Next office visit with prescribing clinician: 7/19/2023     Additional details provided by patient: PATIENT HAS APPROX 10 LEFT    Does the patient have less than a 3 day supply:  [x] Yes  [] No    Would you like a call back once the refill request has been completed: [] Yes [x] No    If the office needs to give you a call back, can they leave a voicemail: [] Yes [x] No    Haresh Garcia Rep   06/16/23 10:31 EDT

## 2023-06-16 NOTE — TELEPHONE ENCOUNTER
Next ov 07/19/23    Last ov 06/06/23    Last lab  09/09/22    Last UDS  02/28/22 - Patient needs updated drug screen     Last contract  02/01/21- Patient needs updated contract

## 2023-06-19 RX ORDER — ALPRAZOLAM 0.5 MG/1
0.5 TABLET ORAL 3 TIMES DAILY PRN
Qty: 90 TABLET | Refills: 0 | Status: SHIPPED | OUTPATIENT
Start: 2023-06-19

## 2023-06-19 RX ORDER — CIPROFLOXACIN 250 MG/1
250 TABLET, FILM COATED ORAL 2 TIMES DAILY
Qty: 6 TABLET | Refills: 0 | Status: SHIPPED | OUTPATIENT
Start: 2023-06-19 | End: 2023-06-19 | Stop reason: SDUPTHER

## 2023-06-19 RX ORDER — CIPROFLOXACIN 250 MG/1
250 TABLET, FILM COATED ORAL 2 TIMES DAILY
Qty: 6 TABLET | Refills: 0 | Status: SHIPPED | OUTPATIENT
Start: 2023-06-19

## 2023-06-19 NOTE — PROGRESS NOTES
Bibi please let patient know I did discuss her urine with urology in an agreement to start Cipro.  I sent prescription to pharmacy

## 2023-07-19 ENCOUNTER — OFFICE VISIT (OUTPATIENT)
Dept: FAMILY MEDICINE CLINIC | Facility: CLINIC | Age: 63
End: 2023-07-19
Payer: COMMERCIAL

## 2023-07-19 VITALS
HEIGHT: 68 IN | DIASTOLIC BLOOD PRESSURE: 70 MMHG | BODY MASS INDEX: 19.85 KG/M2 | OXYGEN SATURATION: 99 % | SYSTOLIC BLOOD PRESSURE: 118 MMHG | WEIGHT: 131 LBS | HEART RATE: 91 BPM | TEMPERATURE: 97.3 F

## 2023-07-19 DIAGNOSIS — F41.9 ANXIETY: ICD-10-CM

## 2023-07-19 DIAGNOSIS — Z51.81 THERAPEUTIC DRUG MONITORING: Primary | ICD-10-CM

## 2023-07-19 PROCEDURE — 99213 OFFICE O/P EST LOW 20 MIN: CPT | Performed by: NURSE PRACTITIONER

## 2023-07-19 RX ORDER — AZELASTINE HYDROCHLORIDE 137 UG/1
SPRAY, METERED NASAL
COMMUNITY
Start: 2023-06-16

## 2023-07-19 RX ORDER — ALPRAZOLAM 0.5 MG/1
0.5 TABLET ORAL 3 TIMES DAILY PRN
Qty: 90 TABLET | Refills: 0 | Status: SHIPPED | OUTPATIENT
Start: 2023-07-19

## 2023-07-26 LAB — DRUGS UR: NORMAL

## 2023-08-07 ENCOUNTER — TELEPHONE (OUTPATIENT)
Dept: FAMILY MEDICINE CLINIC | Facility: CLINIC | Age: 63
End: 2023-08-07

## 2023-08-07 NOTE — TELEPHONE ENCOUNTER
Caller: Janis Rosa    Relationship to patient: Self    Best call back number: [phone]    Patient is needing: PATIENT STATES SHE THINKS SHE HAS C-DIFF AGAIN. PATIENT STATES IT STARTED ABOUT 3:00 A.M.   PLEASE ADVISE

## 2023-08-07 NOTE — TELEPHONE ENCOUNTER
I called patient and tried to get her scheduled for a appointment today but she stated that she could not come into the office today and that she could come in tomorrow possibly. I explained to patient that she should go to the ER near her if she is needing to be seen today. Patient verbalized understanding but stated that she did not want to go to the ER. Patient has a infectious disease doctor but she stated that she can not make it to [place] to see them.

## 2023-08-09 ENCOUNTER — OFFICE VISIT (OUTPATIENT)
Dept: FAMILY MEDICINE CLINIC | Facility: CLINIC | Age: 63
End: 2023-08-09
Payer: COMMERCIAL

## 2023-08-09 VITALS
WEIGHT: 118 LBS | HEART RATE: 98 BPM | HEIGHT: 68 IN | DIASTOLIC BLOOD PRESSURE: 70 MMHG | OXYGEN SATURATION: 98 % | SYSTOLIC BLOOD PRESSURE: 122 MMHG | TEMPERATURE: 96.9 F | BODY MASS INDEX: 17.88 KG/M2

## 2023-08-09 DIAGNOSIS — R30.0 DYSURIA: ICD-10-CM

## 2023-08-09 DIAGNOSIS — R19.5 LOOSE STOOLS: Primary | ICD-10-CM

## 2023-08-09 LAB
BILIRUB BLD-MCNC: ABNORMAL MG/DL
CLARITY, POC: ABNORMAL
COLOR UR: YELLOW
EXPIRATION DATE: ABNORMAL
GLUCOSE UR STRIP-MCNC: NEGATIVE MG/DL
KETONES UR QL: ABNORMAL
LEUKOCYTE EST, POC: ABNORMAL
Lab: ABNORMAL
NITRITE UR-MCNC: NEGATIVE MG/ML
PH UR: 6 [PH] (ref 5–8)
PROT UR STRIP-MCNC: ABNORMAL MG/DL
RBC # UR STRIP: ABNORMAL /UL
SP GR UR: 1.01 (ref 1–1.03)
UROBILINOGEN UR QL: ABNORMAL

## 2023-08-09 PROCEDURE — 99214 OFFICE O/P EST MOD 30 MIN: CPT | Performed by: NURSE PRACTITIONER

## 2023-08-09 PROCEDURE — 81003 URINALYSIS AUTO W/O SCOPE: CPT | Performed by: NURSE PRACTITIONER

## 2023-08-09 RX ORDER — VANCOMYCIN HYDROCHLORIDE 125 MG/1
125 CAPSULE ORAL 4 TIMES DAILY
Qty: 40 CAPSULE | Refills: 0 | Status: SHIPPED | OUTPATIENT
Start: 2023-08-09 | End: 2023-08-19

## 2023-08-09 RX ORDER — VANCOMYCIN HYDROCHLORIDE 125 MG/1
125 CAPSULE ORAL 4 TIMES DAILY
Qty: 10 CAPSULE | Refills: 0 | Status: SHIPPED | OUTPATIENT
Start: 2023-08-09 | End: 2023-08-09 | Stop reason: SDUPTHER

## 2023-08-09 NOTE — PROGRESS NOTES
"Chief Complaint  GI Problem (Patient states that since Saturday she has had loose stools. )    Subjective        Janis Rosa presents to [facility] PRIMARY CARE  History of Present Illness  This is a 62-year-old female patient here today for evaluation of loose stool.  Patient has a history of C. difficile and reports cramping that started on Saturday with loose stools that are mucus and bloody and with foul odor.  She has been seen by Dr. Mcconnell with infectious disease in the past and has been dealing with chronic UTIs where she has been seen by urology with plans for cystoscopy.  Her cystoscopy was rescheduled now to September 5.  She denies any nausea vomiting fever or chills       Objective   Vital Signs:  /70   Pulse 98   Temp 96.9 øF (36.1 øC)   Ht 172.7 cm (67.99\")   Wt 53.5 kg (118 lb)   SpO2 98%   BMI 17.95 kg/mý   Estimated body mass index is 17.95 kg/mý as calculated from the following:    Height as of this encounter: 172.7 cm (67.99\").    Weight as of this encounter: 53.5 kg (118 lb).             Physical Exam  Vitals and nursing note reviewed.   HENT:      Head: Normocephalic.      Nose: Nose normal.   Eyes:      Pupils: Pupils are equal, round, and reactive to light.   Cardiovascular:      Rate and Rhythm: Normal rate and regular rhythm.      Pulses: Normal pulses.      Heart sounds: Normal heart sounds.   Pulmonary:      Effort: Pulmonary effort is normal. No respiratory distress.      Breath sounds: Normal breath sounds. No wheezing or rales.   Abdominal:      General: Bowel sounds are normal. There is no distension.      Palpations: Abdomen is soft.      Tenderness: There is no abdominal tenderness. There is no right CVA tenderness or left CVA tenderness.   Musculoskeletal:         General: No swelling.      Cervical back: Neck supple.      Right lower leg: No edema.      Left lower leg: No edema.   Skin:     General: Skin is warm and dry.   Neurological:      Mental " Status: She is alert and oriented to person, place, and time.   Psychiatric:         Mood and Affect: Mood normal.      Result Review :                   Assessment and Plan   Diagnoses and all orders for this visit:    1. Loose stools (Primary)  -     Clostridioides difficile Toxin, PCR - Stool, Per Rectum    2. Dysuria  -     POC Urinalysis Dipstick, Automated  -     Cancel: Urinalysis With Culture If Indicated - Urine, Clean Catch  -     Urine Culture - Urine, Urine, Clean Catch    Other orders  -     Discontinue: vancomycin (Vancocin) 125 MG capsule; Take 1 capsule by mouth 4 (Four) Times a Day.  Dispense: 10 capsule; Refill: 0  -     vancomycin (Vancocin) 125 MG capsule; Take 1 capsule by mouth 4 (Four) Times a Day for 10 days.  Dispense: 40 capsule; Refill: 0    I did send a staff message to Dr. Mcconnell and he is in agreement to restarting vancomycin.  Patient was instructed to go to ER if symptoms do not improve.  She will continue her appointment with urology.    I spent a total of 30 minutes with the patient today including face-to-face encounter, reviewing data in the system, coordination of care with the nursing staff as well as consultants, documentation and entering orders.           Follow Up   No follow-ups on file.  Patient was given instructions and counseling regarding her condition or for health maintenance advice. Please see specific information pulled into the AVS if appropriate.

## 2023-08-10 ENCOUNTER — TELEPHONE (OUTPATIENT)
Dept: INFECTIOUS DISEASES | Facility: CLINIC | Age: 63
End: 2023-08-10
Payer: COMMERCIAL

## 2023-08-10 NOTE — TELEPHONE ENCOUNTER
"----- Message from Suzy Poole RN sent at 8/9/2023  4:16 PM EDT -----  Patient called stating she believes she has C.Diff again. She was just seen at PCP office this afternoon because of a UTI & said she was supposed to give stool specimen for C.Diff but stated she was unable to while she was there and will provide specimen to their office in the am.   States started with liquid brown stool w/ bloody mucus Sunday and it has continued to be liquid brown w/ bloody mucus every day. Other symptoms: \"bad\" stomach cramps, and has not been able to eat very much.  Said she gave urine specimen today and was told by PCP Kim PATTON that she has UTI. Said Ms. Galindo contacted her urologist to verify if ok to start PO Vancomycin because of concern about the abx potentially giving patient C.Diff. States Urologist indicated that it sounds like patient already has C.diff and they gave their approval for her to start Vancomycin. She plans to start Vancomycin when her spouse brings it home this evening.  Kim Galindo's office note is not finished in Epic and the urine testing is still in process at this time.  Patient is asking if she needs to come into office to see you and/or if you have any other treatment plans for her. Please advise.  Please note that when I leave office today I will be out of the office for just over a week and so could you please just respond to Radha as I have given her a little info about this case before she left today.  Thank you.  (Patient's # [phone])    "

## 2023-08-10 NOTE — TELEPHONE ENCOUNTER
PER DR SALGADO: He states he spoke with the patient's PCP yesterday and they have started the patient on the needed medications. Nothing left for me/us to do from ID standpoint at this time. PW, RN

## 2023-08-11 LAB — C DIFF TOX GENS STL QL NAA+PROBE: POSITIVE

## 2023-08-16 LAB
BACTERIA UR CULT: ABNORMAL
BACTERIA UR CULT: ABNORMAL
OTHER ANTIBIOTIC SUSC ISLT: ABNORMAL

## 2023-08-17 ENCOUNTER — TELEPHONE (OUTPATIENT)
Dept: FAMILY MEDICINE CLINIC | Facility: CLINIC | Age: 63
End: 2023-08-17

## 2023-08-17 RX ORDER — GRANULES FOR ORAL 3 G/1
3 POWDER ORAL ONCE
Qty: 3 G | Refills: 0 | Status: SHIPPED | OUTPATIENT
Start: 2023-08-17 | End: 2023-08-17

## 2023-08-17 NOTE — TELEPHONE ENCOUNTER
Caller: Janis Rosa    Relationship: Self    Best call back number: [phone]*    What is the best time to reach you: ANYTIME    Who are you requesting to speak with (clinical staff, provider,  specific staff member): CLINICAL    What was the call regarding: REGARDING MOST RECENT URINE CULTURE. THE PATIENT STATES SHE HAS QUESTIONS.    Is it okay if the provider responds through MyChart: NO

## 2023-08-22 ENCOUNTER — TELEPHONE (OUTPATIENT)
Dept: FAMILY MEDICINE CLINIC | Facility: CLINIC | Age: 63
End: 2023-08-22

## 2023-08-22 DIAGNOSIS — F33.9 EPISODE OF RECURRENT MAJOR DEPRESSIVE DISORDER, UNSPECIFIED DEPRESSION EPISODE SEVERITY: Primary | ICD-10-CM

## 2023-08-22 NOTE — TELEPHONE ENCOUNTER
Patient states that she called Dr. Bey office  today and she stated that the man who she talked to told her that she had a recall and that she would need to have a coloscopy ordered through Dr. Lopez. She called his office and they stated that they were going to send paperwork in the mail to Ms. Rosa but they stated that in order for it to be done faster she should see if her PCP could put in the order for the coloscopy instead.

## 2023-08-22 NOTE — TELEPHONE ENCOUNTER
Caller: Janis Rosa    Relationship to patient: Self    Patient is needing: PATIENT STATES SHE IS NEEDING A REFERRAL FOR A COLONOSCOPY, BUT WANTS ANGELO BARRON OR HER ASSISTANT TO CALL HER FIRST AS SHE WANTS TO SHARE WITH THEM WHAT SHE HAS BEEN TOLD.   PLEASE CONTACT THE PATIENT  [phone].

## 2023-08-28 RX ORDER — VENLAFAXINE HYDROCHLORIDE 75 MG/1
225 CAPSULE, EXTENDED RELEASE ORAL DAILY
Qty: 270 CAPSULE | Refills: 1 | Status: SHIPPED | OUTPATIENT
Start: 2023-08-28

## 2023-08-29 ENCOUNTER — TELEPHONE (OUTPATIENT)
Dept: FAMILY MEDICINE CLINIC | Facility: CLINIC | Age: 63
End: 2023-08-29
Payer: COMMERCIAL

## 2023-08-29 DIAGNOSIS — R10.30 LOWER ABDOMINAL PAIN: Primary | ICD-10-CM

## 2023-08-29 DIAGNOSIS — Z12.11 COLON CANCER SCREENING: ICD-10-CM

## 2023-08-29 DIAGNOSIS — Z87.19 HISTORY OF DIVERTICULITIS: ICD-10-CM

## 2023-08-29 DIAGNOSIS — K21.9 GASTRO-ESOPHAGEAL REFLUX DISEASE WITHOUT ESOPHAGITIS: ICD-10-CM

## 2023-08-29 RX ORDER — OMEPRAZOLE 20 MG/1
20 CAPSULE, DELAYED RELEASE ORAL 2 TIMES DAILY
Qty: 60 CAPSULE | Refills: 3 | Status: SHIPPED | OUTPATIENT
Start: 2023-08-29

## 2023-08-30 ENCOUNTER — OFFICE VISIT (OUTPATIENT)
Dept: SURGERY | Facility: CLINIC | Age: 63
End: 2023-08-30
Payer: COMMERCIAL

## 2023-08-30 VITALS — HEIGHT: 68 IN | BODY MASS INDEX: 19.85 KG/M2 | WEIGHT: 131 LBS

## 2023-08-30 DIAGNOSIS — Z12.11 SCREENING FOR MALIGNANT NEOPLASM OF COLON: ICD-10-CM

## 2023-08-30 DIAGNOSIS — R10.31 RIGHT LOWER QUADRANT ABDOMINAL PAIN: Primary | ICD-10-CM

## 2023-08-30 PROCEDURE — 99203 OFFICE O/P NEW LOW 30 MIN: CPT

## 2023-08-30 RX ORDER — BUSPIRONE HYDROCHLORIDE 10 MG/1
10 TABLET ORAL 2 TIMES DAILY
COMMUNITY

## 2023-09-04 NOTE — H&P (VIEW-ONLY)
ASSESSMENT/PLAN:  62-year-old female with a 6-month history of right lower quadrant pain. She has had recurrent episodes of C. difficile, likely secondary to antibiotic use for recurrent urinary tract infections. She is undergoing a cystoscopy on 9/6/2023 with Dr. Dotson in [facility].  Regarding her right lower quadrant pain, recommend she undergo a colonoscopy for further evaluation. She had a CT scan in 6/2023 that was unremarkable. Her last colonoscopy was in 2018, significant for marked sigmoid diverticulosis. She is now status post laparoscopic sigmoid colectomy for medically refractory recurrent diverticulitis. It was recommended she undergo screening surveillance in 5 years, thus she is due this year. Reviewed the nature of the procedure, benefits and risks, including but not limited to bleeding, infection, colon perforation and the need for subsequent procedures. She is in agreement and wishes to proceed. Orders placed.    CC:  Abdominal pain    HPI:    62 year old female who presents today for evaluation of right lower quadrant pain. She reports in the past 6 months, she has had an aching sensation in her right lower quadrant. This is more predominant after eating, no specific food triggers. She denies any fevers or chills. She reports her weight has been stable.  Reports headaches, feels this is secondary to recent medication changes. She has had nausea, denies any vomiting. She has had recurrent C. difficile infections, 3 episodes within the past 1 year, 2 of which have been after antibiotic use for recurrent UTI's.  She recently completed a round of Vancomycin last week.  Her bowel movements have since normalized.  She reports occasional rectal bleeding, only with constipation.  She has had recurrent urinary tract infections and has been treated with antibiotics. She is planning on undergoing a cystoscopy with Dr. Dotson in [facility] on 9/6/2023 for further evaluation.    ENDOSCOPY:   04/2021 EGD Dr. Hall    01/2018 Attempted Colonoscopy - Flexible sigmoidoscopy to 50 cm Dr. Anthony: marked sigmoid diverticulosis with angulation precluding passage of colonoscope     RADIOLOGY:   3/6/2023 CT Abdomen/Pelvis: Stable punctate renal cortical calcification on the left and sub-cm simple left kidney cysts. Stable small hemangioma within the right hepatic lobe.   6/1/2023 CT Abdomen/Pelvis: No acute inflammatory process of bowel identified. No obstructive uropathy.     LABS:    8/9/2023 UA - 1+ leukocytes, trace protein, + ketones, 1+ bilirubin, moderate blood, urine culture + Klebsiella   8/10/2023 C. Difficile: positive     SOCIAL HISTORY:   Reports daily vaping.   Denies alcohol use    FAMILY HISTORY:    Colorectal cancer: Negative     PREVIOUS ABDOMINAL SURGERY:  Laparoscopic sigmoid colectomy with mobilization of splenic flexure 03/19/2018 Dr. Anthony (for diverticulitis)   Appendectomy  Hysterectomy     OTHER SURGERY:  Right breast excisional biopsy  Shoulder surgery  Melanoma excision (neck)  Tonsillectomy and adenoidectomy     PAST MEDICAL HISTORY:    Diverticulitis  GERD  Atypical hyperplasia of breast  Endometrial cancer  Depression  Anxiety  Osteoarthritis   C. Difficile   Melanoma  Transverse Myelitis     ALLERGIES:   Percocet - nausea  Morphine - rash   Sulfa antibiotics - rash     MEDICATIONS:   Azelastine  Buspirone  Clindamycin   Fluticasone  Omeprazole  Venlafaxine     ROS:    No cough, chest pain, shortness of air.  All other systems reviewed and negative other than presenting complaints.    PHYSICAL EXAM:   Constitutional: Well-developed, well-nourished, no acute distress  Vital signs:   Ht: 172.7cm   Wt: 59.4kg  BMI: 19.92  Eyes: Conjunctiva normal, sclera nonicteric  ENMT: Hearing grossly normal, oral mucosa moist  Respiratory: Clear to auscultation, normal inspiratory effort  Cardiovascular: Regular rate, no murmur, no peripheral edema, no jugular venous distention  Gastrointestinal: Soft, mildly tender  in right lower quadrant, no palpable mass, no hepatosplenomegaly  Skin:  Warm, dry, no rash on visualized skin surfaces  Musculoskeletal: Symmetric strength, normal gait  Psychiatric: Alert and oriented ×3, normal affect     Joanne Sales PA-C    [facility] - General Surgery   [address], [address]  [place] [address]    [address], [address]  [place] [address]    Office: [phone]  Fax: [phone]

## 2023-09-04 NOTE — PROGRESS NOTES
ASSESSMENT/PLAN:  62-year-old female with a 6-month history of right lower quadrant pain. She has had recurrent episodes of C. difficile, likely secondary to antibiotic use for recurrent urinary tract infections. She is undergoing a cystoscopy on 9/6/2023 with Dr. Dotson in [facility].  Regarding her right lower quadrant pain, recommend she undergo a colonoscopy for further evaluation. She had a CT scan in 6/2023 that was unremarkable. Her last colonoscopy was in 2018, significant for marked sigmoid diverticulosis. She is now status post laparoscopic sigmoid colectomy for medically refractory recurrent diverticulitis. It was recommended she undergo screening surveillance in 5 years, thus she is due this year. Reviewed the nature of the procedure, benefits and risks, including but not limited to bleeding, infection, colon perforation and the need for subsequent procedures. She is in agreement and wishes to proceed. Orders placed.    CC:  Abdominal pain    HPI:    62 year old female who presents today for evaluation of right lower quadrant pain. She reports in the past 6 months, she has had an aching sensation in her right lower quadrant. This is more predominant after eating, no specific food triggers. She denies any fevers or chills. She reports her weight has been stable.  Reports headaches, feels this is secondary to recent medication changes. She has had nausea, denies any vomiting. She has had recurrent C. difficile infections, 3 episodes within the past 1 year, 2 of which have been after antibiotic use for recurrent UTI's.  She recently completed a round of Vancomycin last week.  Her bowel movements have since normalized.  She reports occasional rectal bleeding, only with constipation.  She has had recurrent urinary tract infections and has been treated with antibiotics. She is planning on undergoing a cystoscopy with Dr. Dotson in [facility] on 9/6/2023 for further evaluation.    ENDOSCOPY:   04/2021 EGD Dr. Hall    01/2018 Attempted Colonoscopy - Flexible sigmoidoscopy to 50 cm Dr. Anthony: marked sigmoid diverticulosis with angulation precluding passage of colonoscope     RADIOLOGY:   3/6/2023 CT Abdomen/Pelvis: Stable punctate renal cortical calcification on the left and sub-cm simple left kidney cysts. Stable small hemangioma within the right hepatic lobe.   6/1/2023 CT Abdomen/Pelvis: No acute inflammatory process of bowel identified. No obstructive uropathy.     LABS:    8/9/2023 UA - 1+ leukocytes, trace protein, + ketones, 1+ bilirubin, moderate blood, urine culture + Klebsiella   8/10/2023 C. Difficile: positive     SOCIAL HISTORY:   Reports daily vaping.   Denies alcohol use    FAMILY HISTORY:    Colorectal cancer: Negative     PREVIOUS ABDOMINAL SURGERY:  Laparoscopic sigmoid colectomy with mobilization of splenic flexure 03/19/2018 Dr. Anthony (for diverticulitis)   Appendectomy  Hysterectomy     OTHER SURGERY:  Right breast excisional biopsy  Shoulder surgery  Melanoma excision (neck)  Tonsillectomy and adenoidectomy     PAST MEDICAL HISTORY:    Diverticulitis  GERD  Atypical hyperplasia of breast  Endometrial cancer  Depression  Anxiety  Osteoarthritis   C. Difficile   Melanoma  Transverse Myelitis     ALLERGIES:   Percocet - nausea  Morphine - rash   Sulfa antibiotics - rash     MEDICATIONS:   Azelastine  Buspirone  Clindamycin   Fluticasone  Omeprazole  Venlafaxine     ROS:    No cough, chest pain, shortness of air.  All other systems reviewed and negative other than presenting complaints.    PHYSICAL EXAM:   Constitutional: Well-developed, well-nourished, no acute distress  Vital signs:   Ht: 172.7cm   Wt: 59.4kg  BMI: 19.92  Eyes: Conjunctiva normal, sclera nonicteric  ENMT: Hearing grossly normal, oral mucosa moist  Respiratory: Clear to auscultation, normal inspiratory effort  Cardiovascular: Regular rate, no murmur, no peripheral edema, no jugular venous distention  Gastrointestinal: Soft, mildly tender  in right lower quadrant, no palpable mass, no hepatosplenomegaly  Skin:  Warm, dry, no rash on visualized skin surfaces  Musculoskeletal: Symmetric strength, normal gait  Psychiatric: Alert and oriented ×3, normal affect     Joanne Sales PA-C    [facility] - General Surgery   [address], [address]  [place] [address]    [address], [address]  [place] [address]    Office: [phone]  Fax: [phone]

## 2023-09-05 ENCOUNTER — PROCEDURE VISIT (OUTPATIENT)
Dept: UROLOGY | Facility: CLINIC | Age: 63
End: 2023-09-05
Payer: COMMERCIAL

## 2023-09-05 VITALS — BODY MASS INDEX: 30.32 KG/M2 | HEIGHT: 55 IN | RESPIRATION RATE: 16 BRPM | WEIGHT: 131 LBS

## 2023-09-05 DIAGNOSIS — R30.0 DYSURIA: Primary | ICD-10-CM

## 2023-09-05 DIAGNOSIS — R31.0 GROSS HEMATURIA: ICD-10-CM

## 2023-09-05 DIAGNOSIS — R30.0 DYSURIA: ICD-10-CM

## 2023-09-05 DIAGNOSIS — Z87.440 HISTORY OF RECURRENT UTIS: ICD-10-CM

## 2023-09-05 PROCEDURE — 87088 URINE BACTERIA CULTURE: CPT | Performed by: UROLOGY

## 2023-09-05 PROCEDURE — 87186 SC STD MICRODIL/AGAR DIL: CPT | Performed by: UROLOGY

## 2023-09-05 PROCEDURE — 87086 URINE CULTURE/COLONY COUNT: CPT | Performed by: UROLOGY

## 2023-09-06 ENCOUNTER — TELEPHONE (OUTPATIENT)
Dept: SURGERY | Facility: CLINIC | Age: 63
End: 2023-09-06
Payer: COMMERCIAL

## 2023-09-06 ENCOUNTER — ANESTHESIA EVENT (OUTPATIENT)
Dept: PERIOP | Facility: HOSPITAL | Age: 63
End: 2023-09-06
Payer: COMMERCIAL

## 2023-09-06 NOTE — TELEPHONE ENCOUNTER
Patient said she was seen yesterday and was told she has a UTI. She called to see if she is going to get an antibiotic? # [phone]

## 2023-09-07 ENCOUNTER — HOSPITAL ENCOUNTER (OUTPATIENT)
Facility: HOSPITAL | Age: 63
Setting detail: HOSPITAL OUTPATIENT SURGERY
Discharge: HOME | End: 2023-09-07
Attending: SURGERY | Admitting: SURGERY
Payer: COMMERCIAL

## 2023-09-07 ENCOUNTER — ANESTHESIA (OUTPATIENT)
Dept: PERIOP | Facility: HOSPITAL | Age: 63
End: 2023-09-07
Payer: COMMERCIAL

## 2023-09-07 VITALS
WEIGHT: 131.4 LBS | HEART RATE: 89 BPM | TEMPERATURE: 98.3 F | DIASTOLIC BLOOD PRESSURE: 62 MMHG | BODY MASS INDEX: 128.93 KG/M2 | SYSTOLIC BLOOD PRESSURE: 110 MMHG | RESPIRATION RATE: 15 BRPM | OXYGEN SATURATION: 98 %

## 2023-09-07 DIAGNOSIS — R10.31 RIGHT LOWER QUADRANT ABDOMINAL PAIN: ICD-10-CM

## 2023-09-07 DIAGNOSIS — N30.00 ACUTE CYSTITIS WITHOUT HEMATURIA: Primary | ICD-10-CM

## 2023-09-07 DIAGNOSIS — Z12.11 SCREENING FOR MALIGNANT NEOPLASM OF COLON: ICD-10-CM

## 2023-09-07 LAB — BACTERIA SPEC AEROBE CULT: ABNORMAL

## 2023-09-07 PROCEDURE — 25010000002 PROPOFOL 200 MG/20ML EMULSION: Performed by: NURSE ANESTHETIST, CERTIFIED REGISTERED

## 2023-09-07 PROCEDURE — 45380 COLONOSCOPY AND BIOPSY: CPT | Performed by: SURGERY

## 2023-09-07 PROCEDURE — 88305 TISSUE EXAM BY PATHOLOGIST: CPT | Performed by: SURGERY

## 2023-09-07 RX ORDER — SODIUM CHLORIDE 9 MG/ML
40 INJECTION, SOLUTION INTRAVENOUS AS NEEDED
Status: DISCONTINUED | OUTPATIENT
Start: 2023-09-07 | End: 2023-09-07 | Stop reason: HOSPADM

## 2023-09-07 RX ORDER — LIDOCAINE HYDROCHLORIDE 20 MG/ML
INJECTION, SOLUTION EPIDURAL; INFILTRATION; INTRACAUDAL; PERINEURAL AS NEEDED
Status: DISCONTINUED | OUTPATIENT
Start: 2023-09-07 | End: 2023-09-07 | Stop reason: SURG

## 2023-09-07 RX ORDER — SODIUM CHLORIDE 0.9 % (FLUSH) 0.9 %
10 SYRINGE (ML) INJECTION EVERY 12 HOURS SCHEDULED
Status: DISCONTINUED | OUTPATIENT
Start: 2023-09-07 | End: 2023-09-07 | Stop reason: HOSPADM

## 2023-09-07 RX ORDER — SODIUM CHLORIDE 0.9 % (FLUSH) 0.9 %
10 SYRINGE (ML) INJECTION AS NEEDED
Status: DISCONTINUED | OUTPATIENT
Start: 2023-09-07 | End: 2023-09-07 | Stop reason: HOSPADM

## 2023-09-07 RX ORDER — NITROFURANTOIN 25; 75 MG/1; MG/1
100 CAPSULE ORAL 2 TIMES DAILY
Qty: 6 CAPSULE | Refills: 0 | Status: SHIPPED | OUTPATIENT
Start: 2023-09-07 | End: 2023-09-08 | Stop reason: SDUPTHER

## 2023-09-07 RX ORDER — NITROFURANTOIN 25; 75 MG/1; MG/1
100 CAPSULE ORAL 2 TIMES DAILY
Qty: 10 CAPSULE | Refills: 0 | Status: SHIPPED | OUTPATIENT
Start: 2023-09-07 | End: 2023-09-08 | Stop reason: SDUPTHER

## 2023-09-07 RX ORDER — ONDANSETRON 2 MG/ML
4 INJECTION INTRAMUSCULAR; INTRAVENOUS ONCE AS NEEDED
Status: DISCONTINUED | OUTPATIENT
Start: 2023-09-07 | End: 2023-09-07 | Stop reason: HOSPADM

## 2023-09-07 RX ORDER — SODIUM CHLORIDE, SODIUM LACTATE, POTASSIUM CHLORIDE, CALCIUM CHLORIDE 600; 310; 30; 20 MG/100ML; MG/100ML; MG/100ML; MG/100ML
9 INJECTION, SOLUTION INTRAVENOUS CONTINUOUS PRN
Status: DISCONTINUED | OUTPATIENT
Start: 2023-09-07 | End: 2023-09-07 | Stop reason: HOSPADM

## 2023-09-07 RX ORDER — PROPOFOL 10 MG/ML
INJECTION, EMULSION INTRAVENOUS AS NEEDED
Status: DISCONTINUED | OUTPATIENT
Start: 2023-09-07 | End: 2023-09-07 | Stop reason: SURG

## 2023-09-07 RX ADMIN — LIDOCAINE HYDROCHLORIDE 60 MG: 20 INJECTION, SOLUTION EPIDURAL; INFILTRATION; INTRACAUDAL; PERINEURAL at 12:46

## 2023-09-07 RX ADMIN — PROPOFOL INJECTABLE EMULSION 200 MG: 10 INJECTION, EMULSION INTRAVENOUS at 12:46

## 2023-09-07 RX ADMIN — SODIUM CHLORIDE, POTASSIUM CHLORIDE, SODIUM LACTATE AND CALCIUM CHLORIDE: 600; 310; 30; 20 INJECTION, SOLUTION INTRAVENOUS at 12:38

## 2023-09-07 NOTE — OP NOTE
PREOPERATIVE DIAGNOSIS:  Right lower quadrant abdominal pain and screening    POSTOPERATIVE DIAGNOSIS AND FINDINGS:  Small descending colon polyp    PROCEDURE:  Colonoscopy to terminal ileum with cold biopsy removal of polyp    SURGEON:  Jaycob Anthony MD    ANESTHESIA:  MAC    SPECIMEN(S):  Polyp    DESCRIPTION:  In decubitus position digital rectal exam was normal. Colonoscope inserted under direct visualization of lumen to cecum confirmed by visualization of ileocecal valve and appendiceal orifice.  Ileocecal valve was intubated and terminal ileum was grossly normal.  Scope was slowly withdrawn circumferentially examining all mucosal surfaces.  Bowel preparation was good.  The only mucosal abnormality was a small descending colon polyp that was removed completely with cold biopsy forceps.  Good hemostasis at the site.  Colorectal anastomosis from previous sigmoid colectomy was noted and widely patent.  No other abnormalities noted.  Tolerated well.    RECOMMENDATION FOR FUTURE SURVEILLANCE:  To be determined based on polyp pathology and issued as separate report    Jaycob Anthony M.D.

## 2023-09-07 NOTE — ANESTHESIA POSTPROCEDURE EVALUATION
Patient: Janis Rosa    Procedure Summary       Date: 09/07/23 Room / Location: [facility] ENDOSCOPY 1 /  [facility]    Anesthesia Start: 1242 Anesthesia Stop: 1303    Procedure: COLONOSCOPY WITH POLYPECTOMY Diagnosis:       Right lower quadrant abdominal pain      Screening for malignant neoplasm of colon      (Right lower quadrant abdominal pain [R10.31])      (Screening for malignant neoplasm of colon [Z12.11])    Surgeons: Jaycob Anthony MD Provider: Rosa Diehl CRNA    Anesthesia Type: MAC ASA Status: 2            Anesthesia Type: MAC    Vitals  Vitals Value Taken Time   /66 09/07/23 1320   Temp     Pulse 71 09/07/23 1325   Resp 15 09/07/23 1310   SpO2 99 % 09/07/23 1325   Vitals shown include unvalidated device data.        Post Anesthesia Care and Evaluation    Patient location during evaluation: PHASE II  Patient participation: complete - patient participated  Level of consciousness: awake and alert  Pain score: 0  Pain management: adequate    Airway patency: patent  Anesthetic complications: No anesthetic complications  PONV Status: none  Cardiovascular status: acceptable  Respiratory status: acceptable  Hydration status: acceptable

## 2023-09-07 NOTE — ANESTHESIA PREPROCEDURE EVALUATION
Anesthesia Evaluation     Patient summary reviewed and Nursing notes reviewed   no history of anesthetic complications:   NPO Solid Status: > 8 hours  NPO Liquid Status: > 8 hours           Airway   Mallampati: II  TM distance: >3 FB  Neck ROM: full  No difficulty expected  Dental - normal exam     Pulmonary - normal exam    breath sounds clear to auscultation  (+) a smoker (vapes) Current Smoked day of surgery,  Cardiovascular - negative cardio ROS and normal exam  Exercise tolerance: good (4-7 METS)    Rhythm: regular  Rate: normal        Neuro/Psych  (+) psychiatric history Anxiety and Depression  GI/Hepatic/Renal/Endo    (+) GERD well controlled, GI bleeding lower     ROS Comment: Current UTI    Musculoskeletal     Abdominal  - normal exam   Substance History - negative use     OB/GYN negative ob/gyn ROS         Other   arthritis,   history of cancer remission                  Anesthesia Plan    ASA 2     MAC     intravenous induction     Anesthetic plan, risks, benefits, and alternatives have been provided, discussed and informed consent has been obtained with: patient.  Pre-procedure education provided  Use of blood products discussed with patient  Consented to blood products.      CODE STATUS:

## 2023-09-08 DIAGNOSIS — A04.72 C. DIFFICILE DIARRHEA: ICD-10-CM

## 2023-09-08 DIAGNOSIS — A04.72 C. DIFFICILE DIARRHEA: Primary | ICD-10-CM

## 2023-09-08 DIAGNOSIS — N30.00 ACUTE CYSTITIS WITHOUT HEMATURIA: ICD-10-CM

## 2023-09-08 RX ORDER — NITROFURANTOIN 25; 75 MG/1; MG/1
100 CAPSULE ORAL 2 TIMES DAILY
Qty: 10 CAPSULE | Refills: 0 | Status: SHIPPED | OUTPATIENT
Start: 2023-09-08

## 2023-09-08 RX ORDER — NITROFURANTOIN 25; 75 MG/1; MG/1
100 CAPSULE ORAL 2 TIMES DAILY
Qty: 6 CAPSULE | Refills: 0 | Status: SHIPPED | OUTPATIENT
Start: 2023-09-08 | End: 2023-09-11

## 2023-09-08 RX ORDER — VANCOMYCIN HYDROCHLORIDE 125 MG/1
125 CAPSULE ORAL 2 TIMES DAILY
Qty: 16 CAPSULE | Refills: 0 | Status: SHIPPED | OUTPATIENT
Start: 2023-09-08 | End: 2023-09-16

## 2023-09-08 RX ORDER — VANCOMYCIN HYDROCHLORIDE 125 MG/1
125 CAPSULE ORAL 2 TIMES DAILY
Qty: 16 CAPSULE | Refills: 0 | Status: SHIPPED | OUTPATIENT
Start: 2023-09-08 | End: 2023-09-08 | Stop reason: SDUPTHER

## 2023-09-08 NOTE — TELEPHONE ENCOUNTER
Called brady the infectous disease pharmacist and he stated that patient would need to take macrobid bid x 5 days and if patient wanted could take po vanc 125 mg po bid while on the macrobid. Dr sutherland did send in a course of macrobid for the scope that is on Tuesday.

## 2023-09-11 ENCOUNTER — DOCUMENTATION (OUTPATIENT)
Dept: SURGERY | Facility: CLINIC | Age: 63
End: 2023-09-11
Payer: COMMERCIAL

## 2023-09-11 LAB
LAB AP CASE REPORT: NORMAL
PATH REPORT.FINAL DX SPEC: NORMAL
PATH REPORT.GROSS SPEC: NORMAL

## 2023-09-11 NOTE — PROGRESS NOTES
ENDOSCOPY FOLLOW UP NOTE    Colonoscopy 9/7/2023    Indication:  Right lower quadrant abdominal pain and screening    Findings:  Small descending colon polyp: Pathology-tubular adenoma    Recommendations:  5-year surveillance    Jaycob Anthony M.D.

## 2023-09-11 NOTE — PROGRESS NOTES
Please let her know that her colonoscopy showed a single benign polyp and 5-year surveillance colonoscopy recommended-put in computer for reminder.  There were no findings to explain any source for abdominal pain.

## 2023-09-12 ENCOUNTER — TELEPHONE (OUTPATIENT)
Dept: SURGERY | Facility: CLINIC | Age: 63
End: 2023-09-12
Payer: COMMERCIAL

## 2023-09-12 ENCOUNTER — PREP FOR SURGERY (OUTPATIENT)
Dept: OTHER | Facility: HOSPITAL | Age: 63
End: 2023-09-12
Payer: COMMERCIAL

## 2023-09-12 ENCOUNTER — PROCEDURE VISIT (OUTPATIENT)
Dept: UROLOGY | Facility: CLINIC | Age: 63
End: 2023-09-12
Payer: COMMERCIAL

## 2023-09-12 VITALS — WEIGHT: 133.8 LBS | BODY MASS INDEX: 20.28 KG/M2 | HEIGHT: 68 IN

## 2023-09-12 DIAGNOSIS — Z12.11 SCREEN FOR COLON CANCER: Primary | ICD-10-CM

## 2023-09-12 DIAGNOSIS — Z87.440 HISTORY OF RECURRENT UTIS: Primary | ICD-10-CM

## 2023-09-12 DIAGNOSIS — N95.2 VAGINAL ATROPHY: ICD-10-CM

## 2023-09-12 RX ORDER — ESTRADIOL 0.1 MG/G
0.5 CREAM VAGINAL 3 TIMES WEEKLY
Qty: 42.5 G | Refills: 6 | Status: SHIPPED | OUTPATIENT
Start: 2023-09-13 | End: 2023-12-12

## 2023-09-12 NOTE — PROGRESS NOTES
Preprocedure diagnosis  Recurrent uti    Postprocedure diagnosis  Recurrent UTI, vaginal atrophy    Procedure  Flexible Cystourethroscopy    Attending surgeon  Clau Dotson MD    Anesthesia  2% lidocaine jelly intraurethrally    Complications  None    Indications  62 y.o. female undergoing a flexible cystoscopy for the above mentioned indications.  Patient of Maria Antonia Hernandez for recurrent UTI.  Presents for lower urinary tract evaluation.  On antibiotic.  Has ID doctor, history of C. difficile.  Informed consent was obtained.      Findings  Moderate to severe vaginal atrophy; no urethral abnormality; cystoscopy revealed normal to large capacity bladder, one right and left ureteral orifice in the normal anatomic position, normal bladder mucosa and no tumors, masses or stones.      Procedure  The patient was placed in supine position and prepped and draped in sterile fashion with lidocaine jelly per urethra for anesthesia.  A timeout was performed.  The 14F flexible cystoscope was lubricated and gently placed through the urethra and into the bladder.  The bladder was completely visualized.  The cystoscope was retroflexed and the bladder neck visualized. Findings were as above. The scope was withdrawn and the procedure terminated.  The patient tolerated the procedure well.        Plan:  Tolerated procedure well.  Instructions provided.  Cystoscopic findings discussed with patient include moderate to severe vaginal atrophy, normal to large capacity bladder; normal-appearing mucosa  Recommend ample hydration, not delaying urgency when needs to void, double voiding to ensure adequate emptying; recommend cranberry supplement  Believe would significantly benefit from localized estrogen cream.  Patient states she has previously been on this before but does not have anymore.  Discussed that this is one of the better treatments for recurrent UTI in postmenopausal women.  Administration techniques as well as side effects  were discussed with her.  Sent to pharmacy    Patient to follow-up in 4-6 months, urology MIN[facility]A, Maria Antonia Hernandez  All questions addressed

## 2023-09-12 NOTE — TELEPHONE ENCOUNTER
----- Message from Jaycob Anthony MD sent at 9/11/2023  2:54 PM EDT -----  Please let her know that her colonoscopy showed a single benign polyp and 5-year surveillance colonoscopy recommended-put in computer for reminder.  There were no findings to explain any source for abdominal pain.

## 2023-09-21 ENCOUNTER — OFFICE VISIT (OUTPATIENT)
Dept: BEHAVIORAL HEALTH | Facility: CLINIC | Age: 63
End: 2023-09-21
Payer: COMMERCIAL

## 2023-09-21 VITALS
WEIGHT: 133 LBS | BODY MASS INDEX: 20.16 KG/M2 | HEART RATE: 77 BPM | SYSTOLIC BLOOD PRESSURE: 130 MMHG | RESPIRATION RATE: 16 BRPM | HEIGHT: 68 IN | DIASTOLIC BLOOD PRESSURE: 80 MMHG

## 2023-09-21 DIAGNOSIS — F41.1 GENERALIZED ANXIETY DISORDER WITH PANIC ATTACKS: Primary | Chronic | ICD-10-CM

## 2023-09-21 DIAGNOSIS — F33.1 MAJOR DEPRESSIVE DISORDER, RECURRENT EPISODE, MODERATE: ICD-10-CM

## 2023-09-21 DIAGNOSIS — F41.0 GENERALIZED ANXIETY DISORDER WITH PANIC ATTACKS: Primary | Chronic | ICD-10-CM

## 2023-09-21 RX ORDER — HYDROXYZINE PAMOATE 25 MG/1
CAPSULE ORAL
Qty: 60 CAPSULE | Refills: 0 | Status: SHIPPED | OUTPATIENT
Start: 2023-09-21

## 2023-09-21 RX ORDER — MIRTAZAPINE 15 MG/1
15 TABLET, FILM COATED ORAL NIGHTLY
Qty: 30 TABLET | Refills: 0 | Status: SHIPPED | OUTPATIENT
Start: 2023-09-21

## 2023-09-21 NOTE — PROGRESS NOTES
"  [facility] PROGRESS NOTE  [facility]  [address], [place] [address]    NAME: Janis Rosa     : 1960   MRN: [id number]   PCP: Kim Galindo, ABDI     DATE: 2023    ALLERGY:Percocet [oxycodone-acetaminophen], Morphine and related, and Sulfa antibiotics     MEDICATIONS:  Azelastine HCl solution  clindamycin  estradiol  fluticasone  hydrOXYzine pamoate  mirtazapine  nitrofurantoin (macrocrystal-monohydrate)  omeprazole  venlafaxine XR     VITALS: /80   Pulse 77   Resp 16   Ht 171.5 cm (67.5\")   Wt 60.3 kg (133 lb)   BMI 20.52 kg/m²  No LMP recorded. Patient has had a hysterectomy.     Subjective     Chief Complaint   Patient presents with    Anxiety      HPI:  62 y.o. female patient seen for follow up. Patient last seen > 1 yr ago, patient reports long-term daily use of Xanax, that was complicated by testing positive for THC by her PCP who discontinued medication and sent patient to myself. Patient reports she thinks Effexor is working fine despite worsening anxiety, patient interested in additional treatment options.  Significant stress reported including being in the process of a divorce, failing health of her elderly mother, and chronic C. difficile infections that are potentially requiring a fecal transplant.  Significant sleep disturbance reported, patient says she stays up to 3 or 4 AM and only gets a few hours, patient currently not seeing a therapist, patient denies panic attacks currently, patient reports significant weight loss and poor appetite at times due to C. Diff.    Social Status:  Ethnic Group: Not  Or   Race: White Or   Marital Status:    Employment status: Retired        SUBSTANCE/SEXUAL HISTORY:   reports that she has quit smoking. Her smoking use included electronic cigarette. She has never used smokeless tobacco. She reports that she does not currently use alcohol. She reports that she does not use " drugs.   reports that she is not currently sexually active.      FAMILY HISTORY:  family history includes Alcohol abuse in her father; Bipolar disorder in her father and mother; Breast cancer in her maternal grandfather; COPD in her mother; Esophageal cancer in her maternal grandfather; Heart disease in her mother; Suicide Attempts in her paternal uncle.     PAST MEDICAL HISTORY   has a past medical history of Abdominal pain, Atypical hyperplasia of right breast (03/2011), Clostridium difficile infection, Depression, Diverticulitis, Endometrial cancer, Epigastric pain, Hoarse voice quality, Melanoma, Panic attack, Screening for alcoholism (04/14/2022), and Transverse myelitis.    She has no past medical history of Bipolar disorder, Borderline personality disorder, Hard to intubate, Head injury, Liver disease, Malignant hyperthermia due to anesthesia, Obsessive-compulsive disorder, PONV (postoperative nausea and vomiting), Psychosis, Seizures, Spinal headache, or Substance abuse.     Review of Systems   Constitutional: Negative.  Negative for chills and fever.   Respiratory:  Negative for chest tightness and shortness of breath.    Cardiovascular:  Negative for chest pain.   Gastrointestinal:  Positive for diarrhea and nausea. Negative for vomiting.   Neurological:  Negative for dizziness and light-headedness.   Psychiatric/Behavioral:  Positive for sleep disturbance, depressed mood and stress. Negative for suicidal ideas. The patient is nervous/anxious.      Objective   Physical Exam  Constitutional:       Appearance: Normal appearance.   HENT:      Head: Normocephalic.   Cardiovascular:      Rate and Rhythm: Normal rate.   Pulmonary:      Effort: Pulmonary effort is normal.   Skin:     General: Skin is dry.   Neurological:      General: No focal deficit present.      Mental Status: She is alert and oriented to person, place, and time.   Psychiatric:         Mood and Affect: Mood normal.         Behavior: Behavior  normal.         Thought Content: Thought content normal.     MENTAL STATUS EXAM   General Appearance:  Cleanly groomed and dressed  Eye Contact:  Good eye contact  Attitude:  Cooperative  Motor Activity:  Normal gait, posture  Speech:  Normal rate, tone, volume  Mood and affect:  Anxious and depressed  Thought Process:  Goal-directed  Associations/ Thought Content:  No delusions  Hallucinations:  None  Suicidal Ideations:  Not present  Homicidal Ideation:  Not present  Orientation:  Person, place, time and situation  Fund of Knowledge:  Appropriate for age and educational level  Intellectual Functioning:  Average range  Insight:  Fair  Judgement:  Fair  Reliability:  Fair  Impulse Control:  Fair     LABS:  CBC          6/1/2023    13:39   CBC   WBC 10.01    RBC 5.14    Hemoglobin 14.8    Hematocrit 45.0    MCV 87.5    MCH 28.8    MCHC 32.9    RDW 12.1    Platelets 367       CMP          3/6/2023    15:23 6/1/2023    13:39   CMP   Glucose  159    BUN  11    Creatinine 1.00  0.78    EGFR 64.6  87.1    Sodium  140    Potassium  3.6    Chloride  98    Calcium  9.7    Total Protein  7.7    Albumin  4.8    Globulin  2.9    Total Bilirubin  0.7    Alkaline Phosphatase  104    AST (SGOT)  24    ALT (SGPT)  15    Albumin/Globulin Ratio  1.7    BUN/Creatinine Ratio  14.1    Anion Gap  13.0       Assessment    ASSESSMENT/TREATMENT PLAN/INSTRUCTIONS/EDUCATION    (F41.1,  F41.0) Generalized anxiety disorder with panic attacks - Plan: mirtazapine (Remeron) 15 MG tablet, hydrOXYzine pamoate (Vistaril) 25 MG capsule    (F33.1) Major depressive disorder, recurrent episode, moderate     -The above listed condition/conditions are worse now that patient has been off of Xanax.  Symptoms are exacerbated by significant stress, sleep disturbance, divorce, and chronic C. difficile infections.  Patient educated that Remeron will help anxiety, sleep, and appetite.    AFTER VISIT SUMMARY INSTRUCTIONS:    Medication changes: Discontinue BuSpar,  okay to start Remeron/mirtazapine 15 mg tablet, take 1/2 tablet X 3 days then increase to 1 tablet, take and be in bed within 30 minutes, give yourself a 7 to 8-hour time window devoted to sleep.  Okay to start hydroxyzine/Vistaril 25 mg capsule, take 1 or 2 by mouth daily as needed for panic, medication may make you feel sleepy do not take then drive a car or do something where you can injure yourself.    Return in about 2 weeks (around 10/5/2023) for Medication Check.    PSYCHOTHERAPY:  In/Start Time: 1400.  Out/Stop Time: 1416.  16 minutes of face to face direct patient care with patient was spent for supportive psychotherapy including strengthening self awareness and insights, strengthening coping skills, counseling the patient regarding diagnoses, and utilizing cognitive behavioral therapy to assist the patient in recognizing more appropriate coping mechanisms which are proven effective in reducing the severity of frequency of symptoms.  This APRN assisted the patient in processing the patient's diagnoses, and also acknowledged and normalized the patient's thoughts, feelings, and concerns This APRN allowed the patient to freely discuss issues without interruption or judgment.      MEDICATION ISSUES:  CESAR: Reviewed 09/21/2023     Medications Discontinued During This Encounter   Medication Reason    busPIRone (BUSPAR) 10 MG tablet Historical Med - Therapy completed     New Medications Ordered This Visit   Medications    mirtazapine (Remeron) 15 MG tablet     Sig: Take 1 tablet by mouth Every Night.     Dispense:  30 tablet     Refill:  0    hydrOXYzine pamoate (Vistaril) 25 MG capsule     Sig: Take 1 or 2 capsules by mouth daily as needed for panic.     Dispense:  60 capsule     Refill:  0      No orders of the defined types were placed in this encounter.    -Barriers: medically complex, stress, and low support  -Strengths:  motivated     -Progress toward goal: Not at goal  -Functional Status: Moderate  impairment   -Prognosis: Fair with Ongoing Treatment      Part of this note may be an electronic transcription/translation of spoken language to printed text using the Dragon Dictation System. Some of the data in this electronic note has been brought forward from a previous encounter, any necessary changes have been made, it has been reviewed by this APRN, and it is accurate.    This document has been electronically signed by ABDI Nobles September 21, 2023 17:14 EDT

## 2023-09-21 NOTE — PATIENT INSTRUCTIONS
Medication changes: Discontinue BuSpar, okay to start Remeron/mirtazapine 15 mg tablet, take 1/2 tablet X 3 days then increase to 1 tablet, take and be in bed within 30 minutes, give yourself a 7 to 8-hour time window devoted to sleep.  Okay to start hydroxyzine/Vistaril 25 mg capsule, take 1 or 2 by mouth daily as needed for panic, medication may make you feel sleepy do not take then drive a car or do something where you can injure yourself.    GENERAL NEW PATIENT INSTRUCTIONS:  -The best way to get a hold of Rodger is to call the office at [phone] and ask to leave a message with his medical assistant.  Patient's are not able to message their mental health provider directly via [employer], please give my office up to 48 hours to respond to a patient call/question/refill request.  -Please call 911 or go to the nearest ER if you begin to have thoughts of harming yourself or other people.  -Refill requests will be made during normal office hours, Monday-Friday 8:00-5:30.  Rodger is out of the office on Wednesdays and weekends.  Follow-up appointments must be maintained in order for prescribing to continue.    -Tuesdays and Thursdays are Rodger's in-office days, Mondays and Fridays are his telehealth days.  The decision to be seen virtually or in person is up to the discretion of the provider, not all behavioral health problems are appropriate for telehealth.    NO SHOW POLICY:  We understand unexpected circumstances arise; however, anytime you miss your appointment we are unable to provide you appropriate care.  In addition, each appointment missed could have been used to provide care for others.  We ask that you call at least 24 hours in advance to cancel or reschedule an appointment. We would like to take this opportunity to remind you of our policy stating patients who miss THREE or more appointments without cancelling or rescheduling 24 hours in advance of the appointment may be subject to cancellation of any further  visits with our clinic and recommendation to seek in-person services/visits. Please call [phone] to reschedule your appointment. If there are reasons that make it difficult for you to keep the appointments, please call and let us know how we can help. Please understand that medication prescribing will not continue without seeing your provider.       [facility]   [address] [place] [address]  P: [phone]  F: [phone]

## 2023-09-27 ENCOUNTER — TELEPHONE (OUTPATIENT)
Dept: SURGERY | Facility: CLINIC | Age: 63
End: 2023-09-27
Payer: COMMERCIAL

## 2023-09-27 NOTE — TELEPHONE ENCOUNTER
Patient inquiring about scheduling her fecal transplant. She states she is also having symptoms to c-diff that started back up yesterday. Spoke with Dr. Anthony who is discussing this with a pharmacist to determine next steps and then he plans to contact her. Also requested that Vancomycin 125 mg tabs, 1 tab po q6hr for 10 days, #40, NR, be prescribed. Called this into [facility].   Patient aware of all info above.

## 2023-10-02 ENCOUNTER — TELEPHONE (OUTPATIENT)
Dept: SURGERY | Facility: CLINIC | Age: 63
End: 2023-10-02
Payer: COMMERCIAL

## 2023-10-02 NOTE — TELEPHONE ENCOUNTER
Followed up with the patient regarding a PA request on her Vancomycin. She ended up picking this up for nearly $100 out of pocket. PAs have been done on her past few doses of Vanc and they have all been covered. The PA for the most recent Rx did not go through as Cover My Meds had a member ID that was invalid. She would like to pursue reimbursement if possible. She does not have a prescription card that she is aware of. Advised she will still have to call to request the reimbursement. After attempting to complete a form twice on Cover My Meds-one attempt said her member ID was invalid (ID: [id number], BIN: [id number], Group: [id number], PCN: [id number] with the pharmacy this is what they have on file) and a second attempt said the request could not be processed and to contact the number on the back of the card.     Called patient back and lvm advising of the issue. Asked her to call back with the contact info if she can find her card so we can manually start a PA for her. Or she can contact her insurance or her HR to get prescription coverage info and start a request for reimbursement.

## 2023-10-19 ENCOUNTER — DOCUMENTATION (OUTPATIENT)
Dept: FAMILY MEDICINE CLINIC | Facility: CLINIC | Age: 63
End: 2023-10-19
Payer: COMMERCIAL

## 2023-10-19 RX ORDER — VANCOMYCIN HYDROCHLORIDE 125 MG/1
125 CAPSULE ORAL 4 TIMES DAILY
Qty: 40 CAPSULE | Refills: 0 | Status: SHIPPED | OUTPATIENT
Start: 2023-10-19

## 2023-10-19 NOTE — PROGRESS NOTES
Spoke with patient.  Plans for fecal transplant for recurrent C. difficile with Dr. Anthony stated in needs vancomycin 4 times a day for 10 days called into pharmacy with PA.  I have sent in prescription.

## 2023-10-23 ENCOUNTER — TELEPHONE (OUTPATIENT)
Dept: FAMILY MEDICINE CLINIC | Facility: CLINIC | Age: 63
End: 2023-10-23
Payer: COMMERCIAL

## 2023-10-23 NOTE — TELEPHONE ENCOUNTER
Patient forgot her virtual appointment this morning.  Wants to reschedule for an in person visit.  Please call to schedule

## 2023-10-30 DIAGNOSIS — R30.9 PAINFUL URINATION: Primary | ICD-10-CM

## 2023-10-30 DIAGNOSIS — R31.9 HEMATURIA, UNSPECIFIED TYPE: ICD-10-CM

## 2023-10-31 ENCOUNTER — OFFICE VISIT (OUTPATIENT)
Dept: BEHAVIORAL HEALTH | Facility: CLINIC | Age: 63
End: 2023-10-31
Payer: COMMERCIAL

## 2023-10-31 VITALS
HEIGHT: 68 IN | SYSTOLIC BLOOD PRESSURE: 108 MMHG | BODY MASS INDEX: 20.99 KG/M2 | WEIGHT: 138.5 LBS | HEART RATE: 72 BPM | RESPIRATION RATE: 16 BRPM | DIASTOLIC BLOOD PRESSURE: 68 MMHG | OXYGEN SATURATION: 97 %

## 2023-10-31 DIAGNOSIS — F41.0 GENERALIZED ANXIETY DISORDER WITH PANIC ATTACKS: Primary | ICD-10-CM

## 2023-10-31 DIAGNOSIS — F51.05 INSOMNIA DUE TO OTHER MENTAL DISORDER: ICD-10-CM

## 2023-10-31 DIAGNOSIS — F99 INSOMNIA DUE TO OTHER MENTAL DISORDER: ICD-10-CM

## 2023-10-31 DIAGNOSIS — F33.1 MAJOR DEPRESSIVE DISORDER, RECURRENT EPISODE, MODERATE: Chronic | ICD-10-CM

## 2023-10-31 DIAGNOSIS — F41.1 GENERALIZED ANXIETY DISORDER WITH PANIC ATTACKS: Primary | ICD-10-CM

## 2023-10-31 RX ORDER — HYDROXYZINE PAMOATE 50 MG/1
50 CAPSULE ORAL 2 TIMES DAILY PRN
Qty: 60 CAPSULE | Refills: 0 | Status: SHIPPED | OUTPATIENT
Start: 2023-10-31 | End: 2023-10-31 | Stop reason: SDUPTHER

## 2023-10-31 RX ORDER — HYDROXYZINE PAMOATE 50 MG/1
50 CAPSULE ORAL 2 TIMES DAILY PRN
Qty: 60 CAPSULE | Refills: 0 | Status: SHIPPED | OUTPATIENT
Start: 2023-10-31

## 2023-10-31 RX ORDER — MIRTAZAPINE 15 MG/1
TABLET, ORALLY DISINTEGRATING ORAL
Qty: 53 TABLET | Refills: 0 | Status: SHIPPED | OUTPATIENT
Start: 2023-10-31 | End: 2023-11-30

## 2023-10-31 NOTE — PROGRESS NOTES
[facility] PROGRESS NOTE  [facility]  [address] [place] [address]    NAME: Janis Rosa     : 1960   MRN: [id number]     DATE: 10/31/2023    Subjective     Chief Complaint   Patient presents with    Anxiety    Depression      HPI:  63 y.o. female patient seen for follow up.  Seen last roughly 1 month ago, was due to follow-up in 2 weeks, patient has had 2 appointments that were no-show, patient reports she is not good with virtual, patient not seeing a therapist, patient reports poor sleep, did not take Remeron daily as prescribed only tried for 1.5 weeks, patient rates depression 7/10 and anxiety 8/10, has history of trialing BuSpar that led to headaches has a history of chronic Xanax use that was discontinued due to testing positive for THC.  Patient continues to use THC vape at night to help her sleep.    Social Status:  Ethnic Group: Not  Or   Race: White Or   Marital Status:    Employment status: Retired        SUBSTANCE/SEXUAL HISTORY:  Social History     Socioeconomic History    Marital status:      Spouse name: Bharath    Number of children: 3   Tobacco Use    Smoking status: Former     Packs/day: 0.50     Years: 20.00     Additional pack years: 0.00     Total pack years: 10.00     Types: Cigarettes     Quit date:      Years since quittin.8     Passive exposure: Past    Smokeless tobacco: Never    Tobacco comments:     using a vape cig   Vaping Use    Vaping Use: Every day    Substances: Nicotine    Devices: Pre-filled or refillable cartridge   Substance and Sexual Activity    Alcohol use: Not Currently     Comment: every now and then    Drug use: Never    Sexual activity: Not Currently     FAMILY HISTORY:  family history includes Alcohol abuse in her father; Bipolar disorder in her father and mother; Breast cancer in her maternal grandfather; COPD in her mother; Esophageal cancer in her maternal grandfather; Heart disease  "in her mother; Suicide Attempts in her paternal uncle.     PAST MEDICAL HISTORY   has a past medical history of Abdominal pain, Atypical hyperplasia of right breast (03/2011), Clostridium difficile infection, Depression, Diverticulitis, Endometrial cancer, Epigastric pain, Hoarse voice quality, Melanoma, Panic attack, Screening for alcoholism (04/14/2022), and Transverse myelitis.    She has no past medical history of Bipolar disorder, Borderline personality disorder, Hard to intubate, Head injury, Liver disease, Malignant hyperthermia due to anesthesia, Obsessive-compulsive disorder, PONV (postoperative nausea and vomiting), Psychosis, Seizures, Spinal headache, or Substance abuse.     Review of Systems   Constitutional:  Positive for fatigue.   Respiratory: Negative.     Cardiovascular: Negative.    Gastrointestinal:  Positive for nausea and indigestion.   Psychiatric/Behavioral:  Positive for sleep disturbance and stress. The patient is nervous/anxious.        Objective   Visit Vitals  /68   Pulse 72   Resp 16   Ht 171.5 cm (67.5\")   Wt 62.8 kg (138 lb 8 oz)   SpO2 97%   Breastfeeding No   BMI 21.37 kg/m²     Physical Exam  Constitutional:       Appearance: Normal appearance.   HENT:      Head: Normocephalic.   Pulmonary:      Effort: Pulmonary effort is normal.   Skin:     General: Skin is dry.   Neurological:      Mental Status: She is alert and oriented to person, place, and time.     MENTAL STATUS EXAM   General Appearance:  Cleanly groomed and dressed  Eye Contact:  Good eye contact  Attitude:  Cooperative  Motor Activity:  Normal posture  Speech:  Normal rate, tone, volume  Mood and affect:  Normal, pleasant  Thought Process:  Goal-directed  Associations/ Thought Content:  No delusions  Hallucinations:  None  Suicidal Ideations:  Not present  Homicidal Ideation:  Not present  Sensorium:  Alert  Orientation:  Person, place, time and situation  Attention Span/ Concentration:  Good  Fund of Knowledge:  " Appropriate for age and educational level  Intellectual Functioning:  Average range  Insight:  Fair  Judgement:  Fair  Reliability:  Fair    LABS:  CBC          6/1/2023    13:39   CBC   WBC 10.01    RBC 5.14    Hemoglobin 14.8    Hematocrit 45.0    MCV 87.5    MCH 28.8    MCHC 32.9    RDW 12.1    Platelets 367       CMP          3/6/2023    15:23 6/1/2023    13:39   CMP   Glucose  159    BUN  11    Creatinine 1.00  0.78    EGFR 64.6  87.1    Sodium  140    Potassium  3.6    Chloride  98    Calcium  9.7    Total Protein  7.7    Albumin  4.8    Globulin  2.9    Total Bilirubin  0.7    Alkaline Phosphatase  104    AST (SGOT)  24    ALT (SGPT)  15    Albumin/Globulin Ratio  1.7    BUN/Creatinine Ratio  14.1    Anion Gap  13.0      Allergies   Allergen Reactions    Percocet [Oxycodone-Acetaminophen] Nausea Only    Morphine And Related Rash    Sulfa Antibiotics Rash     Current Outpatient Medications on File Prior to Visit   Medication Sig Dispense Refill    clindamycin (CLEOCIN T) 1 % external solution apply TO THE affected AREA TWICE DAILY AS NEEDED FOR breakout      estradiol (ESTRACE) 0.1 MG/GM vaginal cream Insert 0.5 g into the vagina 3 (Three) Times a Week for 90 days. Please pea-sized amount in vagina and urethra at night 3 times weekly 42.5 g 6    fluticasone (FLONASE) 50 MCG/ACT nasal spray 2 sprays into the nostril(s) as directed by provider Daily. (Patient taking differently: 2 sprays into the nostril(s) as directed by provider Daily As Needed.) 11.1 mL 1    nitrofurantoin, macrocrystal-monohydrate, (Macrobid) 100 MG capsule Take 1 capsule by mouth 2 (Two) Times a Day. 10 capsule 0    omeprazole (priLOSEC) 20 MG capsule Take 1 capsule by mouth 2 (Two) Times a Day. 60 capsule 3    vancomycin (VANCOCIN) 125 MG capsule TAKE ONE CAPSULE BY MOUTH EVERY 6 HOURS FOR 10 DAYS      vancomycin (Vancocin) 125 MG capsule Take 1 capsule by mouth 4 (Four) Times a Day. 40 capsule 0    venlafaxine XR (EFFEXOR-XR) 75 MG 24 hr  capsule Take 3 capsules by mouth Daily. 270 capsule 1    [DISCONTINUED] hydrOXYzine pamoate (Vistaril) 25 MG capsule Take 1 or 2 capsules by mouth daily as needed for panic. 60 capsule 0    Azelastine HCl 137 MCG/SPRAY solution Daily As Needed. (Patient not taking: Reported on 10/31/2023)      [DISCONTINUED] mirtazapine (Remeron) 15 MG tablet Take 1 tablet by mouth Every Night. (Patient not taking: Reported on 10/31/2023) 30 tablet 0     No current facility-administered medications on file prior to visit.       Assessment    ASSESSMENT/TREATMENT PLAN/INSTRUCTIONS/EDUCATION    (F41.1,  F41.0) Generalized anxiety disorder with panic attacks - Plan: Ambulatory Referral to Behavioral Health, mirtazapine (REMERON SOL-TAB) 15 MG disintegrating tablet, hydrOXYzine pamoate (Vistaril) 50 MG capsule    (F33.1) Major depressive disorder, recurrent episode, moderate - Plan: Ambulatory Referral to Behavioral Health, mirtazapine (REMERON SOL-TAB) 15 MG disintegrating tablet, hydrOXYzine pamoate (Vistaril) 50 MG capsule    (F51.05,  F99) Insomnia due to other mental disorder - Plan: Ambulatory Referral to Behavioral Health, mirtazapine (REMERON SOL-TAB) 15 MG disintegrating tablet, hydrOXYzine pamoate (Vistaril) 50 MG capsule     -The above listed condition/conditions are unchanged, patient reports she took Remeron for 1-1/2 weeks and tablet was not dissolving in her mouth so she stopped it, patient reports Effexor is working well for her depression but not anxiety, treatment complicated by THC use and history of headaches with BuSpar, patient educated I did not order the oral disintegrating tablet, agreed to change to the oral disintegrating take 1 tablet X 7 days then increase to 2, increase Vistaril to 50 mg up to twice a day as needed, referral placed to [facility] for counseling, agreed to follow-up in 6 weeks.  Patient educated that if augmentation with a second agent is not effective we would have to switch her from  Effexor to a new medication.  Patient was previously on Xanax that was discontinued due to testing positive for THC.  Treatment also exacerbated by chronic C. difficile infections, having to take vancomycin at home, divorce, and significant sleep disturbance.    Return in 6 weeks (on 12/12/2023) for EVAN, MED CHANGES.    MEDICATION ISSUES:  CESAR: Reviewed 10/31/2023     Medications Discontinued During This Encounter   Medication Reason    mirtazapine (Remeron) 15 MG tablet Alternate therapy    hydrOXYzine pamoate (Vistaril) 25 MG capsule Reorder    hydrOXYzine pamoate (Vistaril) 50 MG capsule Reorder       New Medications Ordered This Visit   Medications    mirtazapine (REMERON SOL-TAB) 15 MG disintegrating tablet     Sig: Place 1 tablet on the tongue Every Night for 7 days, THEN 2 tablets Every Night for 23 days.     Dispense:  53 tablet     Refill:  0    hydrOXYzine pamoate (Vistaril) 50 MG capsule     Sig: Take 1 capsule by mouth 2 (Two) Times a Day As Needed for Anxiety.     Dispense:  60 capsule     Refill:  0        Orders Placed This Encounter   Procedures    Ambulatory Referral to Behavioral Health     Referral Priority:   Routine     Referral Type:   Behavorial Health/Psych     Referral Reason:   Specialty Services Required     Referral Location:   [facility]     Requested Specialty:   Behavioral Health     Number of Visits Requested:   1     -Barriers: medically complex, history of poor compliance with treatment, stress, and low support  -Strengths:  motivated     -Progress toward goal: Not at goal  -Functional Status: Moderate impairment   -Prognosis: Fair with Ongoing Treatment      Part of this note may be an electronic transcription/translation of spoken language to printed text using the Dragon Dictation System. Some of the data in this electronic note has been brought forward from a previous encounter, any necessary changes have been made, it has been reviewed by this APRN, and it is  accurate.    This document has been electronically signed by ABDI Nobles October 31, 2023 11:02 EDT

## 2023-10-31 NOTE — PATIENT INSTRUCTIONS
GENERAL NEW PATIENT INSTRUCTIONS:  -The best way to get a hold of Rodger is to call the office at [phone] and ask to leave a message with his medical assistant.  Patient's are not able to message their mental health provider directly via [employer], please give my office up to 48 hours to respond to a patient call/question/refill request.  -Please call 911 or go to the nearest ER if you begin to have thoughts of harming yourself or other people.  -Refill requests will be made during normal office hours, Monday-Friday 8:00-5:30.  Rodger is out of the office on Wednesdays and weekends.  Follow-up appointments must be maintained in order for prescribing to continue.    -Tuesdays and Thursdays are Rodger's in-office days, Mondays and Fridays are his telehealth days.  The decision to be seen virtually or in person is up to the discretion of the provider, not all behavioral health problems are appropriate for telehealth.    NO SHOW POLICY:  We understand unexpected circumstances arise; however, anytime you miss your appointment we are unable to provide you appropriate care.  In addition, each appointment missed could have been used to provide care for others.  We ask that you call at least 24 hours in advance to cancel or reschedule an appointment. We would like to take this opportunity to remind you of our policy stating patients who miss THREE or more appointments without cancelling or rescheduling 24 hours in advance of the appointment may be subject to cancellation of any further visits with our clinic and recommendation to seek in-person services/visits. Please call [phone] to reschedule your appointment. If there are reasons that make it difficult for you to keep the appointments, please call and let us know how we can help. Please understand that medication prescribing will not continue without seeing your provider.       [facility] [address] [place] [address]  P: [phone]  F:  [phone]

## 2023-11-03 LAB
APPEARANCE UR: ABNORMAL
BACTERIA #/AREA URNS HPF: ABNORMAL /[HPF]
BACTERIA UR CULT: ABNORMAL
BACTERIA UR CULT: ABNORMAL
BILIRUB UR QL STRIP: NEGATIVE
CASTS URNS QL MICRO: ABNORMAL /LPF
COLOR UR: YELLOW
EPI CELLS #/AREA URNS HPF: ABNORMAL /HPF (ref 0–10)
GLUCOSE UR QL STRIP: NEGATIVE
HGB UR QL STRIP: ABNORMAL
KETONES UR QL STRIP: NEGATIVE
LEUKOCYTE ESTERASE UR QL STRIP: ABNORMAL
MICRO URNS: ABNORMAL
NITRITE UR QL STRIP: NEGATIVE
OTHER ANTIBIOTIC SUSC ISLT: ABNORMAL
PH UR STRIP: 6 [PH] (ref 5–7.5)
PROT UR QL STRIP: ABNORMAL
RBC #/AREA URNS HPF: ABNORMAL /HPF (ref 0–2)
SP GR UR STRIP: 1.02 (ref 1–1.03)
URINALYSIS REFLEX: ABNORMAL
UROBILINOGEN UR STRIP-MCNC: 0.2 MG/DL (ref 0.2–1)
WBC #/AREA URNS HPF: >30 /HPF (ref 0–5)

## 2023-11-03 RX ORDER — LEVOFLOXACIN 500 MG/1
500 TABLET, FILM COATED ORAL DAILY
Qty: 5 TABLET | Refills: 0 | Status: SHIPPED | OUTPATIENT
Start: 2023-11-03

## 2023-11-03 RX ORDER — VANCOMYCIN HYDROCHLORIDE 125 MG/1
125 CAPSULE ORAL 2 TIMES DAILY
Qty: 10 CAPSULE | Refills: 0 | Status: SHIPPED | OUTPATIENT
Start: 2023-11-03

## 2023-11-17 RX ORDER — FLUCONAZOLE 150 MG/1
150 TABLET ORAL ONCE
Qty: 1 TABLET | Refills: 0 | Status: SHIPPED | OUTPATIENT
Start: 2023-11-17 | End: 2023-11-17

## 2023-12-20 NOTE — PROGRESS NOTES
[facility] PROGRESS NOTE  [facility]  [address], [place] [address]    NAME: Janis Rosa     : 1960   MRN: [id number]     DATE: 2023    Allergies   Allergen Reactions    Percocet [Oxycodone-Acetaminophen] Nausea Only    Morphine And Related Rash    Sulfa Antibiotics Rash      Current Outpatient Medications   Medication Instructions    Azelastine HCl 137 MCG/SPRAY solution Daily As Needed.    clindamycin (CLEOCIN T) 1 % external solution apply TO THE affected AREA TWICE DAILY AS NEEDED FOR breakout    fluticasone (FLONASE) 50 MCG/ACT nasal spray 2 sprays, Nasal, Daily    hydrOXYzine pamoate (VISTARIL) 50 mg, Oral, 2 Times Daily PRN    mirtazapine (REMERON SOL-TAB) 15 mg, Translingual, Nightly    omeprazole (PRILOSEC) 20 mg, Oral, 2 Times Daily    venlafaxine XR (EFFEXOR-XR) 225 mg, Oral, Daily      Subjective     Chief Complaint   Patient presents with    Anxiety    Depression      HPI:  63 y.o. female patient seen for follow up.  Seen last roughly 6 weeks ago.  Since then patient reports things have been so-so, recently visited her sister in [place] she lives outside of [place] and twisted her ankle, left foot is now in a boot, patient also had a fecal transplant reports she has had no episodes of diarrhea, nausea, indigestion, after 8 weeks she may go into remission, is no longer on oral vancomycin, reports sleeping better with the oral disintegrating tablet of mirtazapine, while out of town due to pharmacy mixup they gave her immediate release Effexor that she must take 3 times a day reports she forgets to take midday dose and it is not as effective, relationship issues with  discussed it appears he is an alcoholic, travels out town for work X 3 weeks and then returns and reports he is drunk in the airport before he even lands, drinks every day for breakfast, goes to a local [employer] hub and drinks and gambles, patient reports her marriage is most likely going  to end in divorce, patient recently went back into going to Voodoo regularly, is a [employer], needs to make an appointment with therapist at [facility].       SUBSTANCE/SEXUAL HISTORY:  Social History     Socioeconomic History    Marital status:      Spouse name: Bharath    Number of children: 3   Tobacco Use    Smoking status: Former     Packs/day: 0.25     Years: 35.00     Additional pack years: 0.00     Total pack years: 8.75     Types: Cigarettes     Quit date: 2018     Years since quittin.9     Passive exposure: Past    Smokeless tobacco: Never    Tobacco comments:     using a vape cig   Vaping Use    Vaping Use: Every day    Substances: Nicotine    Devices: Pre-filled or refillable cartridge   Substance and Sexual Activity    Alcohol use: Not Currently     Comment: every now and then    Drug use: Not Currently     Types: Marijuana    Sexual activity: Not Currently     FAMILY HISTORY:  family history includes Alcohol abuse in her father; Bipolar disorder in her father and mother; Breast cancer in her maternal grandfather; COPD in her mother; Esophageal cancer in her maternal grandfather; Heart disease in her mother; Suicide Attempts in her paternal uncle.     PAST MEDICAL HISTORY   has a past medical history of Abdominal pain, Atypical hyperplasia of right breast (2011), Clostridium difficile infection, Depression, Diverticulitis, Endometrial cancer, Epigastric pain, Hoarse voice quality, Melanoma, Panic attack, Panic disorder (), PTSD (post-traumatic stress disorder), Screening for alcoholism (2022), and Transverse myelitis.    She has no past medical history of ADHD (attention deficit hyperactivity disorder), Alcohol abuse, Alcoholism, Anorexia nervosa, Autism spectrum disorder, Bipolar disorder, Borderline personality disorder, Bulimia nervosa, Chronic pain disorder, Disease of thyroid gland, Hard to intubate, Head injury, HIV disease, Liver disease, Malignant hyperthermia due to  "anesthesia, Obsessive-compulsive disorder, Oppositional defiant disorder, Peripheral neuropathy, PONV (postoperative nausea and vomiting), Psychosis, Schizoaffective disorder, Seizures, Self-injurious behavior, Spinal headache, Substance abuse, Suicide attempt, Violence, history of, Withdrawal symptoms, alcohol, or Withdrawal symptoms, drug or narcotic.     Review of Systems   Constitutional:  Positive for fatigue. Negative for chills and fever.   Respiratory: Negative.     Cardiovascular: Negative.    Gastrointestinal: Negative.    Musculoskeletal:  Positive for gait problem and joint swelling.   Psychiatric/Behavioral:  Positive for stress.      Objective   Visit Vitals  /76   Pulse 85   Resp 16   Ht 171.5 cm (67.5\")   Wt 62.6 kg (138 lb)   SpO2 98%   Breastfeeding No   BMI 21.29 kg/m²       Wt Readings from Last 3 Encounters:   12/21/23 62.6 kg (138 lb)   10/31/23 62.8 kg (138 lb 8 oz)   09/21/23 60.3 kg (133 lb)     BP Readings from Last 3 Encounters:   12/21/23 122/76   10/31/23 108/68   09/21/23 130/80     Pulse Readings from Last 3 Encounters:   12/21/23 85   10/31/23 72   09/21/23 77     Physical Exam  Constitutional:       Appearance: Normal appearance.   HENT:      Head: Normocephalic.   Pulmonary:      Effort: Pulmonary effort is normal.   Skin:     General: Skin is dry.   Neurological:      Mental Status: She is alert and oriented to person, place, and time.     MENTAL STATUS EXAM   General Appearance:  Cleanly groomed and dressed  Eye Contact:  Good eye contact  Attitude:  Cooperative  Motor Activity:  Normal posture  Speech:  Normal rate, tone, volume  Mood and affect:  Normal, pleasant  Thought Process:  Goal-directed  Associations/ Thought Content:  No delusions  Hallucinations:  None  Suicidal Ideations:  Not present  Homicidal Ideation:  Not present  Sensorium:  Alert  Orientation:  Person, place, time and situation  Attention Span/ Concentration:  Good  Fund of Knowledge:  Appropriate for " age and educational level  Intellectual Functioning:  Average range  Insight:  Fair  Judgement:  Fair  Reliability:  Fair    LABS:  CBC          6/1/2023    13:39   CBC   WBC 10.01    RBC 5.14    Hemoglobin 14.8    Hematocrit 45.0    MCV 87.5    MCH 28.8    MCHC 32.9    RDW 12.1    Platelets 367       CMP          3/6/2023    15:23 6/1/2023    13:39   CMP   Glucose  159    BUN  11    Creatinine 1.00  0.78    EGFR 64.6  87.1    Sodium  140    Potassium  3.6    Chloride  98    Calcium  9.7    Total Protein  7.7    Albumin  4.8    Globulin  2.9    Total Bilirubin  0.7    Alkaline Phosphatase  104    AST (SGOT)  24    ALT (SGPT)  15    Albumin/Globulin Ratio  1.7    BUN/Creatinine Ratio  14.1    Anion Gap  13.0        Assessment    ASSESSMENT/TREATMENT PLAN/INSTRUCTIONS/EDUCATION    (F41.1,  F41.0) Generalized anxiety disorder with panic attacks - Plan: mirtazapine (REMERON SOL-TAB) 15 MG disintegrating tablet, venlafaxine XR (EFFEXOR-XR) 75 MG 24 hr capsule    (F33.1) Major depressive disorder, recurrent episode, moderate - Plan: mirtazapine (REMERON SOL-TAB) 15 MG disintegrating tablet, venlafaxine XR (EFFEXOR-XR) 75 MG 24 hr capsule    (F51.05,  F99) Insomnia due to other mental disorder - Plan: mirtazapine (REMERON SOL-TAB) 15 MG disintegrating tablet, venlafaxine XR (EFFEXOR-XR) 75 MG 24 hr capsule     -Anxiety/depression, worsening due to change in formulation of Effexor and poor compliance with 3 times daily dosing.  Agreed to restart extended release capsules in the morning, continue mirtazapine oral disintegrating tablet 1 at bedtime.  Reports prior combination of mirtazapine and Effexor was very helpful.  -Agree to follow-up with transformations therapist Viry, needs to call make an appointment  -Positive coping skills discussed including time with friends/family, regular exercise, pickleball, hobbies, [employer]ianist  -Agreed to follow-up in 6 months or sooner if needed.    Return in about 6 months (around 6/21/2024)  for VIRTUAL OR IN PERSON, NO MED CHANGES.    PSYCHOTHERAPY:  In/Start Time: 1430.  Out/Stop Time: 1446.  16 minutes of face to face direct patient care with patient was spent for supportive psychotherapy including strengthening self awareness and insights, strengthening coping skills, counseling the patient regarding diagnoses, and utilizing cognitive behavioral therapy to assist the patient in recognizing more appropriate coping mechanisms which are proven effective in reducing the severity of frequency of symptoms.  This APRN assisted the patient in processing the patient's diagnoses, and also acknowledged and normalized the patient's thoughts, feelings, and concerns This APRN allowed the patient to freely discuss issues without interruption or judgment.      MEDICATION ISSUES:  CESAR: Reviewed 12/21/2023     Medications Discontinued During This Encounter   Medication Reason    levoFLOXacin (Levaquin) 500 MG tablet *Therapy completed    nitrofurantoin, macrocrystal-monohydrate, (Macrobid) 100 MG capsule *Therapy completed    vancomycin (Vancocin) 125 MG capsule *Therapy completed    venlafaxine XR (EFFEXOR-XR) 75 MG 24 hr capsule Reorder    mirtazapine (REMERON SOL-TAB) 15 MG disintegrating tablet Reorder       New Medications Ordered This Visit   Medications    mirtazapine (REMERON SOL-TAB) 15 MG disintegrating tablet     Sig: Place 1 tablet on the tongue Every Night.     Dispense:  90 tablet     Refill:  0    venlafaxine XR (EFFEXOR-XR) 75 MG 24 hr capsule     Sig: Take 3 capsules by mouth Daily.     Dispense:  270 capsule     Refill:  0       No orders of the defined types were placed in this encounter.    -Barriers: medically complex, history of poor compliance with treatment, stress, and low support  -Strengths:  motivated     -Progress toward goal: Not at goal  -Functional Status: Moderate impairment   -Prognosis: Fair with Ongoing Treatment      SUMMARY/DISCUSSION:  Pt past social/medical/family history  reviewed/updated. ROS conducted, medications/allergies, reviewed.  Most recent vitals/labs reviewed. Pt was given appropriate time to ask questions and concerns were addressed. A thorough discussion was had that included review of disease process, need for continued monitoring and additional treatment options including use of pharmacological and non-pharmacological approaches to care, decisions were made and agreed upon by patient and provider. Discussed the risks, benefits, and potential side effects of the medications; patient ackowledged and verbally consented.     Part of this note may be an electronic transcription/translation of spoken language to printed text using the Dragon Dictation System. Some of the data in this electronic note has been brought forward from a previous encounter, any necessary changes have been made, it has been reviewed by this APRN, and it is accurate.    This document has been electronically signed by ABDI Nobles December 21, 2023 15:23 EST

## 2023-12-21 ENCOUNTER — OFFICE VISIT (OUTPATIENT)
Dept: BEHAVIORAL HEALTH | Facility: CLINIC | Age: 63
End: 2023-12-21
Payer: COMMERCIAL

## 2023-12-21 VITALS
BODY MASS INDEX: 20.92 KG/M2 | RESPIRATION RATE: 16 BRPM | WEIGHT: 138 LBS | OXYGEN SATURATION: 98 % | HEIGHT: 68 IN | HEART RATE: 85 BPM | DIASTOLIC BLOOD PRESSURE: 76 MMHG | SYSTOLIC BLOOD PRESSURE: 122 MMHG

## 2023-12-21 DIAGNOSIS — F99 INSOMNIA DUE TO OTHER MENTAL DISORDER: ICD-10-CM

## 2023-12-21 DIAGNOSIS — F33.1 MAJOR DEPRESSIVE DISORDER, RECURRENT EPISODE, MODERATE: ICD-10-CM

## 2023-12-21 DIAGNOSIS — F41.0 GENERALIZED ANXIETY DISORDER WITH PANIC ATTACKS: Primary | ICD-10-CM

## 2023-12-21 DIAGNOSIS — F51.05 INSOMNIA DUE TO OTHER MENTAL DISORDER: ICD-10-CM

## 2023-12-21 DIAGNOSIS — F41.1 GENERALIZED ANXIETY DISORDER WITH PANIC ATTACKS: Primary | ICD-10-CM

## 2023-12-21 RX ORDER — VENLAFAXINE HYDROCHLORIDE 75 MG/1
225 CAPSULE, EXTENDED RELEASE ORAL DAILY
Qty: 270 CAPSULE | Refills: 0 | Status: SHIPPED | OUTPATIENT
Start: 2023-12-21

## 2023-12-21 RX ORDER — MIRTAZAPINE 15 MG/1
15 TABLET, ORALLY DISINTEGRATING ORAL NIGHTLY
Qty: 90 TABLET | Refills: 0 | Status: SHIPPED | OUTPATIENT
Start: 2023-12-21

## 2023-12-21 NOTE — PATIENT INSTRUCTIONS
GENERAL NEW PATIENT INSTRUCTIONS:  -The best way to get a hold of Rodger is to call the office at [phone] and ask to leave a message with his medical assistant.  Patient's are not able to message their mental health provider directly via [employer], please give my office up to 48 hours to respond to a patient call/question/refill request.  -Please call 911 or go to the nearest ER if you begin to have thoughts of harming yourself or other people.  -Refill requests will be made during normal office hours, Monday-Friday 8:00-5:30.  Rodger is out of the office on Wednesdays and weekends.  Follow-up appointments must be maintained in order for prescribing to continue.    -Tuesdays and Thursdays are Rodger's in-office days, Mondays and Fridays are his telehealth days.  The decision to be seen virtually or in person is up to the discretion of the provider, not all behavioral health problems are appropriate for telehealth.    NO SHOW POLICY:  We understand unexpected circumstances arise; however, anytime you miss your appointment we are unable to provide you appropriate care.  In addition, each appointment missed could have been used to provide care for others.  We ask that you call at least 24 hours in advance to cancel or reschedule an appointment. We would like to take this opportunity to remind you of our policy stating patients who miss THREE or more appointments without cancelling or rescheduling 24 hours in advance of the appointment may be subject to cancellation of any further visits with our clinic and recommendation to seek in-person services/visits. Please call [phone] to reschedule your appointment. If there are reasons that make it difficult for you to keep the appointments, please call and let us know how we can help. Please understand that medication prescribing will not continue without seeing your provider.       [facility] [address] [place] [address]  P: [phone]  F:  [phone]

## 2023-12-28 ENCOUNTER — OFFICE VISIT (OUTPATIENT)
Dept: FAMILY MEDICINE CLINIC | Facility: CLINIC | Age: 63
End: 2023-12-28
Payer: COMMERCIAL

## 2023-12-28 VITALS
HEART RATE: 104 BPM | BODY MASS INDEX: 20.64 KG/M2 | DIASTOLIC BLOOD PRESSURE: 70 MMHG | SYSTOLIC BLOOD PRESSURE: 120 MMHG | RESPIRATION RATE: 20 BRPM | WEIGHT: 136.2 LBS | HEIGHT: 68 IN | OXYGEN SATURATION: 100 % | TEMPERATURE: 97.7 F

## 2023-12-28 DIAGNOSIS — M79.672 LEFT FOOT PAIN: Primary | ICD-10-CM

## 2023-12-28 DIAGNOSIS — H92.09 EARACHE: ICD-10-CM

## 2023-12-28 DIAGNOSIS — S93.402A SPRAIN OF LEFT ANKLE, UNSPECIFIED LIGAMENT, INITIAL ENCOUNTER: ICD-10-CM

## 2023-12-28 NOTE — PROGRESS NOTES
Chief Complaint  Ankle Injury (left) and Ear Fullness (left)    Subjective         Janis Rosa presents to [facility]  History of Present Illness    63-year-old female complains of left ankle and foot pain and swelling.  Symptoms started 4 weeks ago, patient rolled her ankle while she was in [place].  Patient went to an urgent care and x-rays were taken.  Patient states she was told she had great to sprain without fracture.  She was put in a boot.    Since then the swelling has improved but not resolved, patient reports new burning pain in her foot which with increased redness and warmness.  Patient states that after she sprained her ankle she was not able to place weight on her foot immediately.    Patient also complaining of left ear ache for about 3 weeks.  Stated that symptoms improved after she felt a pop in her ear and saw some yellow fluid drainage.  However still complains of some discomfort.  Patient states that she has history of recurrent ear infection with bilateral ear tubes and right tympanic membrane reconstructions.  Patient states that her left ear tube fell out a while ago.     Patient has chronic sinusitis and uses Flonase.  Patient follows with ENT.  No fevers or chills.    Objective     Review of Systems     Past Medical History:   Diagnosis Date    Abdominal pain     Atypical hyperplasia of right breast 03/2011    s/p excisional biopsy, rt breast w/ mammogram needle localization    Clostridium difficile infection     Depression     Diverticulitis     Endometrial cancer     Epigastric pain     Hoarse voice quality     Melanoma     Panic attack     Panic disorder 2005    PTSD (post-traumatic stress disorder)     Screening for alcoholism 04/14/2022    Transverse myelitis     HISTORY OF YEARS AGO        Current Outpatient Medications:     clindamycin (CLEOCIN T) 1 % external solution, apply TO THE affected AREA TWICE DAILY AS NEEDED FOR breakout, Disp: , Rfl:      "hydrOXYzine pamoate (Vistaril) 50 MG capsule, Take 1 capsule by mouth 2 (Two) Times a Day As Needed for Anxiety., Disp: 60 capsule, Rfl: 0    mirtazapine (REMERON SOL-TAB) 15 MG disintegrating tablet, Place 1 tablet on the tongue Every Night., Disp: 90 tablet, Rfl: 0    omeprazole (priLOSEC) 20 MG capsule, Take 1 capsule by mouth 2 (Two) Times a Day., Disp: 60 capsule, Rfl: 3    venlafaxine XR (EFFEXOR-XR) 75 MG 24 hr capsule, Take 3 capsules by mouth Daily., Disp: 270 capsule, Rfl: 0    Azelastine HCl 137 MCG/SPRAY solution, Daily As Needed. (Patient not taking: Reported on 10/31/2023), Disp: , Rfl:     fluticasone (FLONASE) 50 MCG/ACT nasal spray, 2 sprays into the nostril(s) as directed by provider Daily. (Patient not taking: Reported on 2023), Disp: 11.1 mL, Rfl: 1   Social History     Socioeconomic History    Marital status:      Spouse name: Bharath    Number of children: 3   Tobacco Use    Smoking status: Former     Packs/day: 0.25     Years: 35.00     Additional pack years: 0.00     Total pack years: 8.75     Types: Cigarettes     Quit date:      Years since quittin.9     Passive exposure: Past    Smokeless tobacco: Never    Tobacco comments:     using a vape cig   Vaping Use    Vaping Use: Every day    Substances: Nicotine    Devices: Pre-filled or refillable cartridge   Substance and Sexual Activity    Alcohol use: Not Currently     Comment: every now and then    Drug use: Not Currently     Types: Marijuana    Sexual activity: Not Currently      Vital Signs:   /70   Pulse 104   Temp 97.7 °F (36.5 °C)   Resp 20   Ht 171.5 cm (67.5\")   Wt 61.8 kg (136 lb 3.2 oz)   SpO2 100%   BMI 21.02 kg/m²       Physical Exam  HENT:      Left Ear: External ear normal.      Ears:      Comments: Left ear   cerumen impaction   Mild left ear canal inflammation  There is cerumen impaction obscuring complete vision of tympanic membrane  Visible portion of tympanic membrane suspected perforation/old " ear tube location without erythema    Right ear  Ear tube in place  Reconstructed tympanic membrane  Musculoskeletal:      Comments: Tenderness to palpation over the base of fifth metatarsal and posterior aspect of lateral malleolus of the left foot.  There is slight warmness and redness at the lateral aspect of the left foot compared to the right foot.  Patient has full range of motion of the left ankle            Result Review :                 Assessment and Plan    Diagnoses and all orders for this visit:    1. Left foot pain (Primary)  -     XR Foot 3+ View Left; Future    2. Sprain of left ankle, unspecified ligament, initial encounter  -     XR Ankle 3+ View Left; Future    3. Earache      Left foot and ankle pain  I will repeat foot and ankle x-ray to rule out fracture  Ibuprofen 400 mg every 4 hours as needed  Keep foot in surgical boot      Left earache  Patient had cerumen impaction in her left ear, dried wax removal with ear curette was unsuccessful, attempted left ear irrigation which revealed inflamed ear canal.  I was unable to fully evaluate tympanic membrane as patient had remaining cerumen obscuring complete vision of the tympanic membrane, however, visible portion of tympanic membrane suspected perforation vs old ear tube location without erythema  Patient has history of C. difficile and she is currently undergoing fecal transplant, cannot prescribe oral antibiotic  Patient has Ciprodex eardrops from her ENT, encouraged her to use the medication 4 drops in ear twice daily for 7 days  Encourage patient to follow-up with her ENT doctor      Follow Up   No follow-ups on file.  Patient was given instructions and counseling regarding her condition or for health maintenance advice. Please see specific information pulled into the AVS if appropriate.

## 2023-12-28 NOTE — PATIENT INSTRUCTIONS
Take ibuprofen 400 mg every 6 hours as needed for pain on a full stomach and with a full glass of water  Apply Cipro-dex ear drop 4 drops twice daily for 7 days  Call back if you have any worsening symptoms

## 2023-12-29 ENCOUNTER — TELEPHONE (OUTPATIENT)
Dept: FAMILY MEDICINE CLINIC | Facility: CLINIC | Age: 63
End: 2023-12-29
Payer: COMMERCIAL

## 2023-12-29 ENCOUNTER — HOSPITAL ENCOUNTER (OUTPATIENT)
Dept: GENERAL RADIOLOGY | Facility: HOSPITAL | Age: 63
Discharge: HOME | End: 2023-12-29
Admitting: FAMILY MEDICINE
Payer: COMMERCIAL

## 2023-12-29 DIAGNOSIS — F51.05 INSOMNIA DUE TO OTHER MENTAL DISORDER: ICD-10-CM

## 2023-12-29 DIAGNOSIS — F33.1 MAJOR DEPRESSIVE DISORDER, RECURRENT EPISODE, MODERATE: Chronic | ICD-10-CM

## 2023-12-29 DIAGNOSIS — S93.402A SPRAIN OF LEFT ANKLE, UNSPECIFIED LIGAMENT, INITIAL ENCOUNTER: ICD-10-CM

## 2023-12-29 DIAGNOSIS — M79.672 LEFT FOOT PAIN: ICD-10-CM

## 2023-12-29 DIAGNOSIS — F41.1 GENERALIZED ANXIETY DISORDER WITH PANIC ATTACKS: ICD-10-CM

## 2023-12-29 DIAGNOSIS — F99 INSOMNIA DUE TO OTHER MENTAL DISORDER: ICD-10-CM

## 2023-12-29 DIAGNOSIS — F41.0 GENERALIZED ANXIETY DISORDER WITH PANIC ATTACKS: ICD-10-CM

## 2023-12-29 PROCEDURE — 73630 X-RAY EXAM OF FOOT: CPT

## 2023-12-29 PROCEDURE — 73610 X-RAY EXAM OF ANKLE: CPT

## 2023-12-29 RX ORDER — HYDROXYZINE PAMOATE 50 MG/1
50 CAPSULE ORAL 2 TIMES DAILY PRN
Qty: 180 CAPSULE | Refills: 0 | Status: SHIPPED | OUTPATIENT
Start: 2023-12-29

## 2023-12-29 NOTE — TELEPHONE ENCOUNTER
Patient called requesting refill on Vistaril 50 mgs called to [facility] [phone] . Patient called back number [phone]

## 2024-01-02 ENCOUNTER — TELEPHONE (OUTPATIENT)
Dept: ORTHOPEDIC SURGERY | Facility: CLINIC | Age: 64
End: 2024-01-02
Payer: COMMERCIAL

## 2024-01-02 NOTE — TELEPHONE ENCOUNTER
PATIENT CALLED BACK REQUESTING TO GO TO [place] AS REFERRED     -NEW PATIENT / LEFT ANKLE SPRAIN, LEFT 5th METATARSAL FRACTURE / INJURY 12-01-23 / XR 12-29-23 / NO PRIOR SURGERIES     -PATIENT INFORMED SHE WILL BE CALLED re: URGENT APPT IN [place] ONCE PRIOR RECORDS REVIEWED BY [facility]     REFERRAL [id number] CURRENTLY IN SCHEDULE REVIEW    THANKS

## 2024-01-02 NOTE — TELEPHONE ENCOUNTER
Called patient about referral we received. Spoke with patient, she is checking with her husbands doctor since there is no availability in [place] for a few weeks, and will give the office a call back. I offered [facility], and she said that it is a little far.

## 2024-01-04 ENCOUNTER — OFFICE VISIT (OUTPATIENT)
Dept: ORTHOPEDIC SURGERY | Facility: CLINIC | Age: 64
End: 2024-01-04
Payer: COMMERCIAL

## 2024-01-04 ENCOUNTER — PATIENT ROUNDING (BHMG ONLY) (OUTPATIENT)
Dept: ORTHOPEDIC SURGERY | Facility: CLINIC | Age: 64
End: 2024-01-04
Payer: COMMERCIAL

## 2024-01-04 VITALS
WEIGHT: 136 LBS | BODY MASS INDEX: 20.61 KG/M2 | DIASTOLIC BLOOD PRESSURE: 75 MMHG | HEART RATE: 105 BPM | SYSTOLIC BLOOD PRESSURE: 113 MMHG | HEIGHT: 68 IN

## 2024-01-04 DIAGNOSIS — S92.355A NONDISPLACED FRACTURE OF FIFTH METATARSAL BONE, LEFT FOOT, INITIAL ENCOUNTER FOR CLOSED FRACTURE: Primary | ICD-10-CM

## 2024-01-04 RX ORDER — MELOXICAM 7.5 MG/1
7.5 TABLET ORAL DAILY
Qty: 30 TABLET | Refills: 5 | Status: SHIPPED | OUTPATIENT
Start: 2024-01-04

## 2024-01-04 NOTE — PROGRESS NOTES
January 4, 2024    Hello, may I speak with Janis Rosa?    My name is Marge      I am  with [facility]  [address]  [facility] [address] [phone].    Before we get started may I verify your date of birth? 1960    I am calling to officially welcome you to our practice and ask about your recent visit. Is this a good time to talk? yes    Tell me about your visit with us. What things went well?  It was okay.        We're always looking for ways to make our patients' experiences even better. Do you have recommendations on ways we may improve?  yes. I didn't like that all the doctors do not go to the other locations.     Overall were you satisfied with your first visit to our practice? yes       I appreciate you taking the time to speak with me today. Is there anything else I can do for you? no      Thank you, and have a great day.

## 2024-01-04 NOTE — PROGRESS NOTES
Subjective: Left foot pain     Patient ID: Janis Rosa is a 63 y.o. female.    Chief Complaint:    History of Present Illness 63-year-old female is seen for left foot and ankle which she injured back in  while in [place] visiting her sister.  States she was walking when she missed stepped and rolled her ankle sustained an inversion injury developed immediate pain and discomfort laterally to the ankle and the foot.  Was seen in the local urgent care where x-rays and reported to be negative but she did a short fracture boot which she has been wearing because of the pain.  When she returned home because of the persistent pain discomfort was seen by her primary care physician who ordered an x-ray which showed a mildly displaced fracture involving the base of the fifth metatarsal.  Presents here today for evaluation.  Has been taking ibuprofen for the pain.       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   Tobacco Use    Smoking status: Former     Packs/day: 0.25     Years: 35.00     Additional pack years: 0.00     Total pack years: 8.75     Types: Cigarettes     Quit date: 2018     Years since quittin.0     Passive exposure: Past    Smokeless tobacco: Never    Tobacco comments:     using a vape cig   Vaping Use    Vaping Use: Every day    Substances: Nicotine    Devices: Disposable   Substance and Sexual Activity    Alcohol use: Not Currently     Comment: every now and then    Drug use: Never     Types: Marijuana    Sexual activity: Not Currently     Birth control/protection: Hysterectomy      Review of Systems      Past Medical History:   Diagnosis Date    Abdominal pain     Ankle sprain Several times Rt    Atypical hyperplasia of right breast 2011    s/p excisional biopsy, rt breast w/ mammogram needle localization    Clostridium difficile infection     Depression     Diverticulitis      Endometrial cancer     Epigastric pain     Fracture, foot 12-1-2023    Frozen shoulder ?    Hoarse voice quality     Melanoma     Panic attack     Panic disorder 2005    PTSD (post-traumatic stress disorder)     Screening for alcoholism 04/14/2022    Tear of meniscus of knee 4-?-22    Transverse myelitis     HISTORY OF YEARS AGO     Past Surgical History:   Procedure Laterality Date    ABDOMINAL SURGERY      APPENDECTOMY N/A     BREAST BIOPSY Right 03/02/2011    right breast stereotactic biopsy    BREAST EXCISIONAL BIOPSY Right 03/11/2011    Excision biopsy, right breast with mammogram needle localization-Dr. Robert Hurley    COLON RESECTION N/A 03/19/2018    Procedure: LAPAROSCOPIC SIGMOID COLON RESECTION;  Surgeon: Jaycob Anthony MD;  Location: [facility] MAIN OR;  Service: General    COLONOSCOPY N/A 01/17/2018    Procedure: FLEXIBLE SIGMOIDOSCPY TO 50 CM;  Surgeon: Jaycob Anthony MD;  Location:  [facility];  Service:     COLONOSCOPY W/ POLYPECTOMY N/A 09/07/2023    Procedure: COLONOSCOPY WITH POLYPECTOMY;  Surgeon: Jaycob Anthony MD;  Location: [facility] OR;  Service: Gastroenterology;  Laterality: N/A;  descending polyp-forcep;    ENDOSCOPY N/A 04/19/2021    Procedure: ESOPHAGOGASTRODUODENOSCOPY WITH COLD BIOPSIES;  Surgeon: Cali Hall MD;  Location:  [facility];  Service: Gastroenterology;  Laterality: N/A;  PRE: DYSPHAGIA, REFLUX  POST: ESOPHAGITIS, IRREGULAR Z LINE    HYSTERECTOMY      SHOULDER SURGERY Right     SKIN BIOPSY  08/17/2022    SKIN CANCER EXCISION      melanoma removed from neck    TONSILLECTOMY AND ADENOIDECTOMY       Family History   Problem Relation Age of Onset    Bipolar disorder Mother         No formal diagnosis    COPD Mother     Heart disease Mother     Alcohol abuse Father     Bipolar disorder Father     Suicide Attempts Paternal Uncle     Breast cancer Maternal Grandfather     Esophageal cancer Maternal Grandfather     Malig Hyperthermia Neg Hx           Objective:  Vitals:    01/04/24 1348   BP: 113/75   Pulse: 105         01/04/24  1348   Weight: 61.7 kg (136 lb)     Body mass index is 20.99 kg/m².        Ortho Exam   review of the x-rays done on 29 December of the ankle and the foot shows a mildly displaced fifth metatarsal base fracture.  No prior x-rays available.  She is alert and oriented x 3.  Head is normocephalic and sclerae clear.  Her calf shows minimal tenderness but there is no swelling or erythema.  She is active dorsi and plantarflexion of the foot.  There is some swelling over the base of the fifth metatarsal laterally.  There is minimal tenderness over the collateral ligaments of the ankle laterally involving the anterior talofibular and calcaneal fibular ligament but there is no instability.  She has a good dorsalis pedis pulse.  No tenderness medially.  Achilles tendon is negative.    Assessment:    XR Ankle 3+ View Left    Result Date: 12/29/2023    1. Calcifications distal the lateral malleolus which may represent an old avulsion injury. 2. Subacute fracture through the base of the 5th metatarsal      Yovani Dickson MD       Electronically Signed and Approved By: Yovani Dickson MD on 12/29/2023 at 14:20             XR Foot 3+ View Left    Result Date: 12/29/2023    1. Subacute fracture through the base of the 5th metatarsal      Yovani Dickson MD       Electronically Signed and Approved By: Yovani Dickson MD on 12/29/2023 at 14:18                    1. Nondisplaced fracture of fifth metatarsal bone, left foot, initial encounter for closed fracture           Plan: Reviewed the x-rays with the patient and her .  She has a minimally displaced fracture involving the base of the left fifth metatarsal.  I did advise her to continue wearing the boot I will place her on also on meloxicam to help control any further inflammation involving the ankle sprain and also placed her on 81 mg of aspirin for DVT prophylaxis.  She may also take  Tylenol 500 mg every 4 hours as needed for pain.  Did advise her on using a cane in the right hand to help assist with ambulation to put as much weight on that left foot as pain permits.  Did advise an elevation and heat to the foot.  She will return to see me in 3 weeks with a repeat x-ray although I did advise her should she develop any increasing pain for no reason she needs to be seen sooner.  Patient was in agreement.  Answered all questions            Work Status:    [facility] query complete.    Orders:  No orders of the defined types were placed in this encounter.      Medications:  New Medications Ordered This Visit   Medications    meloxicam (MOBIC) 7.5 MG tablet     Sig: Take 1 tablet by mouth Daily.     Dispense:  30 tablet     Refill:  5       Followup:  Return in about 3 weeks (around 1/25/2024).          Dictated utilizing Dragon dictation

## 2024-01-23 ENCOUNTER — OFFICE VISIT (OUTPATIENT)
Dept: FAMILY MEDICINE CLINIC | Facility: CLINIC | Age: 64
End: 2024-01-23
Payer: COMMERCIAL

## 2024-01-23 VITALS
HEIGHT: 68 IN | DIASTOLIC BLOOD PRESSURE: 62 MMHG | BODY MASS INDEX: 21.07 KG/M2 | OXYGEN SATURATION: 94 % | SYSTOLIC BLOOD PRESSURE: 118 MMHG | WEIGHT: 139 LBS | HEART RATE: 104 BPM | TEMPERATURE: 96.9 F

## 2024-01-23 DIAGNOSIS — M54.50 MIDLINE LOW BACK PAIN WITHOUT SCIATICA, UNSPECIFIED CHRONICITY: Primary | ICD-10-CM

## 2024-01-23 DIAGNOSIS — R30.0 DYSURIA: ICD-10-CM

## 2024-01-23 LAB
BILIRUB BLD-MCNC: ABNORMAL MG/DL
CLARITY, POC: ABNORMAL
COLOR UR: YELLOW
EXPIRATION DATE: ABNORMAL
GLUCOSE UR STRIP-MCNC: NEGATIVE MG/DL
KETONES UR QL: ABNORMAL
LEUKOCYTE EST, POC: ABNORMAL
Lab: ABNORMAL
NITRITE UR-MCNC: NEGATIVE MG/ML
PH UR: 5.5 [PH] (ref 5–8)
PROT UR STRIP-MCNC: ABNORMAL MG/DL
RBC # UR STRIP: ABNORMAL /UL
SP GR UR: 1.03 (ref 1–1.03)
UROBILINOGEN UR QL: NORMAL

## 2024-01-23 PROCEDURE — 81003 URINALYSIS AUTO W/O SCOPE: CPT | Performed by: NURSE PRACTITIONER

## 2024-01-23 PROCEDURE — 99213 OFFICE O/P EST LOW 20 MIN: CPT | Performed by: NURSE PRACTITIONER

## 2024-01-23 RX ORDER — PHENAZOPYRIDINE HYDROCHLORIDE 200 MG/1
200 TABLET, FILM COATED ORAL 3 TIMES DAILY PRN
Qty: 60 TABLET | Refills: 0 | Status: SHIPPED | OUTPATIENT
Start: 2024-01-23

## 2024-01-23 RX ORDER — PHENAZOPYRIDINE HYDROCHLORIDE 200 MG/1
200 TABLET, FILM COATED ORAL 3 TIMES DAILY PRN
Qty: 60 TABLET | Refills: 0 | Status: SHIPPED | OUTPATIENT
Start: 2024-01-23 | End: 2024-01-23 | Stop reason: SDUPTHER

## 2024-01-23 NOTE — PROGRESS NOTES
"Chief Complaint  Blood in Urine (Patient states that she believes that she passed a kidney stone on Saturday. She states that she is having blood in her urine, burning and lower back pain. )    Subjective        Janis Rosa presents to [facility]  History of Present Illness  This is a 63-year-old female patient here today for dysuria and hematuria.  She is a history of UTIs in the past along with C. difficile where she has been seen by urology.  Cystoscopy was normal.  She has recently completed fecal transplant in November and has not had any issues since.  She reports on Saturday she noticed some lower back pain and hematuria with blood clots.  She denies any fever or chills.  She continues to have dysuria with lower back pain.  She also has some mild suprapubic pain.      Objective   Vital Signs:  /62   Pulse 104   Temp 96.9 °F (36.1 °C)   Ht 171.5 cm (67.52\")   Wt 63 kg (139 lb)   SpO2 94%   BMI 21.44 kg/m²   Estimated body mass index is 21.44 kg/m² as calculated from the following:    Height as of this encounter: 171.5 cm (67.52\").    Weight as of this encounter: 63 kg (139 lb).       BMI is within normal parameters. No other follow-up for BMI required.      Physical Exam  Vitals and nursing note reviewed.   HENT:      Head: Normocephalic.      Nose: Nose normal.   Eyes:      Pupils: Pupils are equal, round, and reactive to light.   Cardiovascular:      Rate and Rhythm: Normal rate and regular rhythm.      Pulses: Normal pulses.      Heart sounds: Normal heart sounds.   Pulmonary:      Effort: Pulmonary effort is normal. No respiratory distress.      Breath sounds: Normal breath sounds. No wheezing or rales.   Abdominal:      General: Bowel sounds are normal. There is no distension.      Tenderness: There is no abdominal tenderness.   Musculoskeletal:         General: No swelling.      Cervical back: Neck supple.      Right lower leg: No edema.      Left lower leg: " No edema.   Skin:     General: Skin is warm and dry.   Neurological:      Mental Status: She is alert and oriented to person, place, and time.   Psychiatric:         Mood and Affect: Mood normal.        Result Review :                     Assessment and Plan     Diagnoses and all orders for this visit:    1. Midline low back pain without sciatica, unspecified chronicity (Primary)    2. Dysuria    Other orders  -     Discontinue: phenazopyridine (Pyridium) 200 MG tablet; Take 1 tablet by mouth 3 (Three) Times a Day As Needed for Bladder Spasms.  Dispense: 60 tablet; Refill: 0  -     phenazopyridine (Pyridium) 200 MG tablet; Take 1 tablet by mouth 3 (Three) Times a Day As Needed for Bladder Spasms.  Dispense: 60 tablet; Refill: 0    Urine does show moderate blood  Will send urine for culture  Will give pyridium prn  Encourage fluids, will wait for culture       Follow Up     No follow-ups on file.  Patient was given instructions and counseling regarding her condition or for health maintenance advice. Please see specific information pulled into the AVS if appropriate.

## 2024-01-25 ENCOUNTER — OFFICE VISIT (OUTPATIENT)
Dept: ORTHOPEDIC SURGERY | Facility: CLINIC | Age: 64
End: 2024-01-25
Payer: COMMERCIAL

## 2024-01-25 VITALS — BODY MASS INDEX: 21.07 KG/M2 | WEIGHT: 139 LBS | HEIGHT: 68 IN

## 2024-01-25 DIAGNOSIS — S92.355D NONDISPLACED FRACTURE OF FIFTH METATARSAL BONE, LEFT FOOT, SUBSEQUENT ENCOUNTER FOR FRACTURE WITH ROUTINE HEALING: Primary | ICD-10-CM

## 2024-01-25 NOTE — PROGRESS NOTES
Subjective: Left fifth metatarsal base fracture     Patient ID: Janis Rosa is a 63 y.o. female.    Chief Complaint:    History of Present Illness 63-year-old female is now about 6 to 7 weeks out from her injury and is doing better reporting less pain but still has some discomfort with ambulation with the boot.       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   Tobacco Use    Smoking status: Former     Packs/day: 0.25     Years: 35.00     Additional pack years: 0.00     Total pack years: 8.75     Types: Cigarettes     Quit date: 2018     Years since quittin.0     Passive exposure: Past    Smokeless tobacco: Never    Tobacco comments:     using a vape cig   Vaping Use    Vaping Use: Every day    Substances: Nicotine    Devices: Disposable   Substance and Sexual Activity    Alcohol use: Not Currently     Comment: every now and then    Drug use: Never     Types: Marijuana    Sexual activity: Not Currently     Birth control/protection: Hysterectomy      Review of Systems      Past Medical History:   Diagnosis Date    Abdominal pain     Ankle sprain Several times Rt    Atypical hyperplasia of right breast 2011    s/p excisional biopsy, rt breast w/ mammogram needle localization    Clostridium difficile infection     Depression     Diverticulitis     Endometrial cancer     Epigastric pain     Fracture, foot 2023    Frozen shoulder ?    Hoarse voice quality     Melanoma     Panic attack     Panic disorder     PTSD (post-traumatic stress disorder)     Screening for alcoholism 2022    Tear of meniscus of knee 4-?-22    Transverse myelitis     HISTORY OF YEARS AGO     Past Surgical History:   Procedure Laterality Date    ABDOMINAL SURGERY      APPENDECTOMY N/A     BREAST BIOPSY Right 2011    right breast stereotactic biopsy    BREAST EXCISIONAL BIOPSY Right 2011    Excision biopsy, right  breast with mammogram needle localization-Dr. Robert Hurley    COLON RESECTION N/A 03/19/2018    Procedure: LAPAROSCOPIC SIGMOID COLON RESECTION;  Surgeon: Jaycob Anthony MD;  Location: [facility] MAIN OR;  Service: General    COLONOSCOPY N/A 01/17/2018    Procedure: FLEXIBLE SIGMOIDOSCPY TO 50 CM;  Surgeon: Jaycob Anthony MD;  Location:  [facility];  Service:     COLONOSCOPY W/ POLYPECTOMY N/A 09/07/2023    Procedure: COLONOSCOPY WITH POLYPECTOMY;  Surgeon: Jaycob Anthony MD;  Location:  [facility];  Service: Gastroenterology;  Laterality: N/A;  descending polyp-forcep;    ENDOSCOPY N/A 04/19/2021    Procedure: ESOPHAGOGASTRODUODENOSCOPY WITH COLD BIOPSIES;  Surgeon: Cali Hall MD;  Location: [facility];  Service: Gastroenterology;  Laterality: N/A;  PRE: DYSPHAGIA, REFLUX  POST: ESOPHAGITIS, IRREGULAR Z LINE    HYSTERECTOMY      SHOULDER SURGERY Right     SKIN BIOPSY  08/17/2022    SKIN CANCER EXCISION      melanoma removed from neck    TONSILLECTOMY AND ADENOIDECTOMY       Family History   Problem Relation Age of Onset    Bipolar disorder Mother         No formal diagnosis    COPD Mother     Heart disease Mother     Alcohol abuse Father     Bipolar disorder Father     Suicide Attempts Paternal Uncle     Breast cancer Maternal Grandfather     Esophageal cancer Maternal Grandfather     Malig Hyperthermia Neg Hx          Objective:  There were no vitals filed for this visit.      01/25/24  1257   Weight: 63 kg (139 lb)     Body mass index is 21.44 kg/m².        Ortho Exam   AP lateral oblique view show the nondisplaced fracture with some callus bridging the fracture site compared to previous x-rays.  Alignment remains anatomical.  She is alert and oriented x 3.  There are still some tenderness over the fracture site but there is no swelling or erythema.  She has good dorsalis pedis pulse and the skin is cool to touch    Assessment:        1. Nondisplaced fracture of fifth metatarsal bone, left  foot, subsequent encounter for fracture with routine healing           Plan: Continue the baby aspirin and the meloxicam.  Continue the fracture boot for ambulation.  Return in 3 weeks with a repeat x-ray.  The patient does not smoke cigarettes but does vaping which may slow down healing as I discussed with her.  Patient was in agreement.  Answered all questions            Work Status:    [facility] query complete.    Orders:  Orders Placed This Encounter   Procedures    XR Foot 3+ View Left       Medications:  No orders of the defined types were placed in this encounter.      Followup:  Return in about 3 weeks (around 2/15/2024).          Dictated utilizing Dragon dictation

## 2024-01-28 LAB
BACTERIA UR CULT: ABNORMAL
OTHER ANTIBIOTIC SUSC ISLT: ABNORMAL

## 2024-01-30 RX ORDER — CEFDINIR 300 MG/1
300 CAPSULE ORAL 2 TIMES DAILY
Qty: 14 CAPSULE | Refills: 0 | Status: SHIPPED | OUTPATIENT
Start: 2024-01-30

## 2024-01-30 RX ORDER — FLUCONAZOLE 150 MG/1
150 TABLET ORAL ONCE
Qty: 1 TABLET | Refills: 0 | Status: SHIPPED | OUTPATIENT
Start: 2024-01-30 | End: 2024-01-30

## 2024-02-15 ENCOUNTER — OFFICE VISIT (OUTPATIENT)
Dept: ORTHOPEDIC SURGERY | Facility: CLINIC | Age: 64
End: 2024-02-15
Payer: COMMERCIAL

## 2024-02-15 VITALS — WEIGHT: 139 LBS | BODY MASS INDEX: 21.07 KG/M2 | HEIGHT: 68 IN

## 2024-02-15 DIAGNOSIS — S92.355K: ICD-10-CM

## 2024-02-15 DIAGNOSIS — S92.355D NONDISPLACED FRACTURE OF FIFTH METATARSAL BONE, LEFT FOOT, SUBSEQUENT ENCOUNTER FOR FRACTURE WITH ROUTINE HEALING: Primary | ICD-10-CM

## 2024-02-15 RX ORDER — ASPIRIN 81 MG/1
81 TABLET ORAL DAILY
COMMUNITY

## 2024-02-19 ENCOUNTER — TRANSCRIBE ORDERS (OUTPATIENT)
Dept: ADMINISTRATIVE | Facility: HOSPITAL | Age: 64
End: 2024-02-19
Payer: COMMERCIAL

## 2024-02-19 DIAGNOSIS — M76.72 PERONEAL TENDINITIS OF LOWER LEG, LEFT: Primary | ICD-10-CM

## 2024-02-21 ENCOUNTER — HOSPITAL ENCOUNTER (OUTPATIENT)
Dept: MRI IMAGING | Facility: HOSPITAL | Age: 64
Discharge: HOME | End: 2024-02-21
Admitting: ORTHOPAEDIC SURGERY
Payer: COMMERCIAL

## 2024-02-21 DIAGNOSIS — M76.72 PERONEAL TENDINITIS OF LOWER LEG, LEFT: ICD-10-CM

## 2024-02-21 PROCEDURE — 73721 MRI JNT OF LWR EXTRE W/O DYE: CPT

## 2024-02-29 ENCOUNTER — OFFICE VISIT (OUTPATIENT)
Dept: FAMILY MEDICINE CLINIC | Facility: CLINIC | Age: 64
End: 2024-02-29
Payer: COMMERCIAL

## 2024-02-29 VITALS
WEIGHT: 134 LBS | RESPIRATION RATE: 18 BRPM | DIASTOLIC BLOOD PRESSURE: 76 MMHG | BODY MASS INDEX: 20.31 KG/M2 | TEMPERATURE: 97 F | SYSTOLIC BLOOD PRESSURE: 118 MMHG | HEART RATE: 78 BPM | HEIGHT: 68 IN | OXYGEN SATURATION: 96 %

## 2024-02-29 DIAGNOSIS — F41.1 GENERALIZED ANXIETY DISORDER WITH PANIC ATTACKS: Chronic | ICD-10-CM

## 2024-02-29 DIAGNOSIS — E53.8 VITAMIN B12 DEFICIENCY: ICD-10-CM

## 2024-02-29 DIAGNOSIS — Z12.31 ENCOUNTER FOR SCREENING MAMMOGRAM FOR MALIGNANT NEOPLASM OF BREAST: ICD-10-CM

## 2024-02-29 DIAGNOSIS — E55.9 VITAMIN D DEFICIENCY: ICD-10-CM

## 2024-02-29 DIAGNOSIS — R31.9 HEMATURIA, UNSPECIFIED TYPE: ICD-10-CM

## 2024-02-29 DIAGNOSIS — F41.0 GENERALIZED ANXIETY DISORDER WITH PANIC ATTACKS: Chronic | ICD-10-CM

## 2024-02-29 DIAGNOSIS — Z00.00 ROUTINE ADULT HEALTH MAINTENANCE: Primary | ICD-10-CM

## 2024-02-29 DIAGNOSIS — F33.1 MAJOR DEPRESSIVE DISORDER, RECURRENT EPISODE, MODERATE: Chronic | ICD-10-CM

## 2024-02-29 RX ORDER — FLUCONAZOLE 150 MG/1
150 TABLET ORAL ONCE
Qty: 1 TABLET | Refills: 0 | Status: SHIPPED | OUTPATIENT
Start: 2024-02-29 | End: 2024-02-29

## 2024-02-29 NOTE — PROGRESS NOTES
"Chief Complaint  Annual Exam    Subjective        Janis Rosa presents to [facility]  History of Present Illness  This is a 63-year-old female patient here today for annual exam.  She is currently being followed by orthopedic for fracture to left foot.  She was seeing Dr. Galloway but has a referral now with [facility] Dr. Victor in April.  She is now in a tall boot and currently taking meloxicam.  She has a past history of C. difficile and has recently had fecal transplant.  She follows urology due to hematuria and has recently had a UTI.  She was on antibiotics and tolerated that well.  She had had a cystoscopy with urology with no new findings.  She has had a colonoscopy with Dr. Anthony.  She is following behavioral health for anxiety and depression.  She has had a complete hysterectomy but does report some vaginal discharge since her UTI.  She denies any hematuria urinary frequency.  She has a past history of vitamin D and B12 deficiency.  She is due mammogram.  She denies any new complaints today.     Objective   Vital Signs:  /76   Pulse 78   Temp 97 °F (36.1 °C)   Resp 18   Ht 171.5 cm (67.52\")   Wt 60.8 kg (134 lb)   SpO2 96%   BMI 20.67 kg/m²   Estimated body mass index is 20.67 kg/m² as calculated from the following:    Height as of this encounter: 171.5 cm (67.52\").    Weight as of this encounter: 60.8 kg (134 lb).       BMI is within normal parameters. No other follow-up for BMI required.      Physical Exam  Constitutional:       Appearance: Normal appearance.   HENT:      Head: Normocephalic.      Right Ear: Tympanic membrane normal.      Left Ear: Tympanic membrane normal.      Nose: Nose normal.      Mouth/Throat:      Mouth: Mucous membranes are moist.   Eyes:      Pupils: Pupils are equal, round, and reactive to light.   Cardiovascular:      Rate and Rhythm: Normal rate and regular rhythm.      Pulses: Normal pulses.      Heart sounds: Normal " heart sounds.   Pulmonary:      Effort: Pulmonary effort is normal.      Breath sounds: Normal breath sounds.   Abdominal:      General: Bowel sounds are normal. There is no distension.      Palpations: There is no mass.   Musculoskeletal:         General: No swelling or tenderness.      Cervical back: Normal range of motion and neck supple.      Right lower leg: No edema.      Left lower leg: No edema.   Feet:      Comments:      Skin:     General: Skin is warm and dry.   Neurological:      Mental Status: She is alert and oriented to person, place, and time.   Psychiatric:         Mood and Affect: Mood normal.         Behavior: Behavior normal.        Result Review :                     Assessment and Plan     Diagnoses and all orders for this visit:    1. Routine adult health maintenance (Primary)    2. Vitamin D deficiency    3. Vitamin B12 deficiency    4. Encounter for screening mammogram for malignant neoplasm of breast  -     Mammo Screening Digital Tomosynthesis Bilateral With CAD    5. Hematuria, unspecified type    6. Major depressive disorder, recurrent episode, moderate    7. Generalized anxiety disorder with panic attacks    Other orders  -     fluconazole (Diflucan) 150 MG tablet; Take 1 tablet by mouth 1 (One) Time for 1 dose.  Dispense: 1 tablet; Refill: 0      She will return for fasting labs.  I will give her Diflucan today.  She will continue to follow her specialist      The patient was counseled regarding nutrition, physical activity, healthy weight, injury prevention, misuse of tobacco, alcohol and illicit drugs, sexual behavior and STI's, contraception, dental health, mental health, immunizations, and screenings.            Follow Up     No follow-ups on file.  Patient was given instructions and counseling regarding her condition or for health maintenance advice. Please see specific information pulled into the AVS if appropriate.

## 2024-03-04 ENCOUNTER — TELEPHONE (OUTPATIENT)
Dept: FAMILY MEDICINE CLINIC | Facility: CLINIC | Age: 64
End: 2024-03-04
Payer: COMMERCIAL

## 2024-03-04 DIAGNOSIS — Z13.0 SCREENING FOR DEFICIENCY ANEMIA: ICD-10-CM

## 2024-03-04 DIAGNOSIS — Z13.220 SCREENING FOR HYPERLIPIDEMIA: ICD-10-CM

## 2024-03-04 DIAGNOSIS — E53.8 VITAMIN B12 DEFICIENCY: ICD-10-CM

## 2024-03-04 DIAGNOSIS — Z13.89 SCREENING FOR HEMATURIA OR PROTEINURIA: ICD-10-CM

## 2024-03-04 DIAGNOSIS — E55.9 VITAMIN D DEFICIENCY: Primary | ICD-10-CM

## 2024-03-04 NOTE — TELEPHONE ENCOUNTER
PT STOPPED BY AND STATED SHE NEEDS YOU TO CALL IN THIS MOUTH RINSE FOR HER     Lido/bendo/antacid/pred  Qty 120 ml    TO [facility] [place]

## 2024-03-07 LAB
25(OH)D3+25(OH)D2 SERPL-MCNC: 20.3 NG/ML (ref 30–100)
ALBUMIN SERPL-MCNC: 4.6 G/DL (ref 3.5–5.2)
ALBUMIN/GLOB SERPL: 1.9 G/DL
ALP SERPL-CCNC: 94 U/L (ref 39–117)
ALT SERPL-CCNC: 12 U/L (ref 1–33)
APPEARANCE UR: ABNORMAL
AST SERPL-CCNC: 21 U/L (ref 1–32)
BACTERIA #/AREA URNS HPF: ABNORMAL /HPF
BACTERIA UR CULT: ABNORMAL
BASOPHILS # BLD AUTO: 0.03 10*3/MM3 (ref 0–0.2)
BASOPHILS NFR BLD AUTO: 0.4 % (ref 0–1.5)
BILIRUB SERPL-MCNC: 0.3 MG/DL (ref 0–1.2)
BILIRUB UR QL STRIP: NEGATIVE
BUN SERPL-MCNC: 17 MG/DL (ref 8–23)
BUN/CREAT SERPL: 22.7 (ref 7–25)
CALCIUM SERPL-MCNC: 9.6 MG/DL (ref 8.6–10.5)
CASTS URNS QL MICRO: ABNORMAL /LPF
CHLORIDE SERPL-SCNC: 104 MMOL/L (ref 98–107)
CHOLEST SERPL-MCNC: 239 MG/DL (ref 0–200)
CO2 SERPL-SCNC: 25.5 MMOL/L (ref 22–29)
COLOR UR: YELLOW
CREAT SERPL-MCNC: 0.75 MG/DL (ref 0.57–1)
EGFRCR SERPLBLD CKD-EPI 2021: 90.7 ML/MIN/1.73
EOSINOPHIL # BLD AUTO: 0.13 10*3/MM3 (ref 0–0.4)
EOSINOPHIL NFR BLD AUTO: 1.5 % (ref 0.3–6.2)
EPI CELLS #/AREA URNS HPF: ABNORMAL /HPF (ref 0–10)
ERYTHROCYTE [DISTWIDTH] IN BLOOD BY AUTOMATED COUNT: 12.5 % (ref 12.3–15.4)
FOLATE SERPL-MCNC: 4.48 NG/ML (ref 4.78–24.2)
GLOBULIN SER CALC-MCNC: 2.4 GM/DL
GLUCOSE SERPL-MCNC: 76 MG/DL (ref 65–99)
GLUCOSE UR QL STRIP: NEGATIVE
HCT VFR BLD AUTO: 39.7 % (ref 34–46.6)
HDLC SERPL-MCNC: 81 MG/DL (ref 40–60)
HGB BLD-MCNC: 12.7 G/DL (ref 12–15.9)
HGB UR QL STRIP: ABNORMAL
IMM GRANULOCYTES # BLD AUTO: 0.03 10*3/MM3 (ref 0–0.05)
IMM GRANULOCYTES NFR BLD AUTO: 0.4 % (ref 0–0.5)
KETONES UR QL STRIP: NEGATIVE
LDLC SERPL CALC-MCNC: 140 MG/DL (ref 0–100)
LEUKOCYTE ESTERASE UR QL STRIP: ABNORMAL
LYMPHOCYTES # BLD AUTO: 1.73 10*3/MM3 (ref 0.7–3.1)
LYMPHOCYTES NFR BLD AUTO: 20.6 % (ref 19.6–45.3)
MCH RBC QN AUTO: 27.1 PG (ref 26.6–33)
MCHC RBC AUTO-ENTMCNC: 32 G/DL (ref 31.5–35.7)
MCV RBC AUTO: 84.8 FL (ref 79–97)
MICRO URNS: ABNORMAL
MONOCYTES # BLD AUTO: 0.76 10*3/MM3 (ref 0.1–0.9)
MONOCYTES NFR BLD AUTO: 9 % (ref 5–12)
NEUTROPHILS # BLD AUTO: 5.73 10*3/MM3 (ref 1.7–7)
NEUTROPHILS NFR BLD AUTO: 68.1 % (ref 42.7–76)
NITRITE UR QL STRIP: NEGATIVE
NRBC BLD AUTO-RTO: 0 /100 WBC (ref 0–0.2)
OTHER ANTIBIOTIC SUSC ISLT: ABNORMAL
PH UR STRIP: 5.5 [PH] (ref 5–7.5)
PLATELET # BLD AUTO: 367 10*3/MM3 (ref 140–450)
POTASSIUM SERPL-SCNC: 4.2 MMOL/L (ref 3.5–5.2)
PROT SERPL-MCNC: 7 G/DL (ref 6–8.5)
PROT UR QL STRIP: ABNORMAL
RBC # BLD AUTO: 4.68 10*6/MM3 (ref 3.77–5.28)
RBC #/AREA URNS HPF: ABNORMAL /HPF (ref 0–2)
SODIUM SERPL-SCNC: 144 MMOL/L (ref 136–145)
SP GR UR STRIP: 1.02 (ref 1–1.03)
TRIGL SERPL-MCNC: 104 MG/DL (ref 0–150)
URINALYSIS REFLEX: ABNORMAL
UROBILINOGEN UR STRIP-MCNC: 0.2 MG/DL (ref 0.2–1)
VIT B12 SERPL-MCNC: 398 PG/ML (ref 211–946)
VLDLC SERPL CALC-MCNC: 18 MG/DL (ref 5–40)
WBC # BLD AUTO: 8.41 10*3/MM3 (ref 3.4–10.8)
WBC #/AREA URNS HPF: >30 /HPF (ref 0–5)

## 2024-03-08 RX ORDER — ERGOCALCIFEROL 1.25 MG/1
50000 CAPSULE ORAL WEEKLY
Qty: 5 CAPSULE | Refills: 3 | Status: SHIPPED | OUTPATIENT
Start: 2024-03-08

## 2024-03-08 NOTE — PROGRESS NOTES
Cholesterol is elevated limit fatty foods and increase exercise. Vit d is low. Recommend vit d weekly. I will send in prescription for this. There is blood in urine but low colony count. All other labs are stable.

## 2024-03-22 ENCOUNTER — HOSPITAL ENCOUNTER (OUTPATIENT)
Dept: MAMMOGRAPHY | Facility: HOSPITAL | Age: 64
Discharge: HOME | End: 2024-03-22
Admitting: NURSE PRACTITIONER
Payer: COMMERCIAL

## 2024-03-22 DIAGNOSIS — F51.05 INSOMNIA DUE TO OTHER MENTAL DISORDER: ICD-10-CM

## 2024-03-22 DIAGNOSIS — F41.1 GENERALIZED ANXIETY DISORDER WITH PANIC ATTACKS: ICD-10-CM

## 2024-03-22 DIAGNOSIS — F41.0 GENERALIZED ANXIETY DISORDER WITH PANIC ATTACKS: ICD-10-CM

## 2024-03-22 DIAGNOSIS — F33.1 MAJOR DEPRESSIVE DISORDER, RECURRENT EPISODE, MODERATE: ICD-10-CM

## 2024-03-22 DIAGNOSIS — F99 INSOMNIA DUE TO OTHER MENTAL DISORDER: ICD-10-CM

## 2024-03-22 PROCEDURE — 77067 SCR MAMMO BI INCL CAD: CPT

## 2024-03-22 PROCEDURE — 77063 BREAST TOMOSYNTHESIS BI: CPT

## 2024-03-22 RX ORDER — VENLAFAXINE HYDROCHLORIDE 75 MG/1
225 CAPSULE, EXTENDED RELEASE ORAL DAILY
Qty: 270 CAPSULE | Refills: 2 | Status: SHIPPED | OUTPATIENT
Start: 2024-03-22

## 2024-03-22 NOTE — TELEPHONE ENCOUNTER
Received fax from patient's [facility] pharmacy requesting refill of venlafaxine    LAST REFILL - 12/21/23  LAST VISIT - 12/21/23  NEXT VISIT - 06/27/24

## 2024-08-22 ENCOUNTER — OFFICE VISIT (OUTPATIENT)
Dept: FAMILY MEDICINE CLINIC | Facility: CLINIC | Age: 64
End: 2024-08-22
Payer: COMMERCIAL

## 2024-08-22 VITALS
BODY MASS INDEX: 21.82 KG/M2 | HEART RATE: 70 BPM | DIASTOLIC BLOOD PRESSURE: 70 MMHG | RESPIRATION RATE: 14 BRPM | TEMPERATURE: 98.1 F | WEIGHT: 144 LBS | SYSTOLIC BLOOD PRESSURE: 112 MMHG | OXYGEN SATURATION: 99 % | HEIGHT: 68 IN

## 2024-08-22 DIAGNOSIS — F33.1 MAJOR DEPRESSIVE DISORDER, RECURRENT EPISODE, MODERATE: ICD-10-CM

## 2024-08-22 DIAGNOSIS — E55.9 VITAMIN D DEFICIENCY: ICD-10-CM

## 2024-08-22 DIAGNOSIS — Z13.89 SCREENING FOR HEMATURIA OR PROTEINURIA: ICD-10-CM

## 2024-08-22 DIAGNOSIS — F41.1 GENERALIZED ANXIETY DISORDER WITH PANIC ATTACKS: Primary | ICD-10-CM

## 2024-08-22 DIAGNOSIS — E78.2 MIXED HYPERLIPIDEMIA: ICD-10-CM

## 2024-08-22 DIAGNOSIS — F41.0 GENERALIZED ANXIETY DISORDER WITH PANIC ATTACKS: Primary | ICD-10-CM

## 2024-08-22 DIAGNOSIS — Z13.0 SCREENING FOR DEFICIENCY ANEMIA: ICD-10-CM

## 2024-08-22 DIAGNOSIS — F51.05 INSOMNIA DUE TO OTHER MENTAL DISORDER: ICD-10-CM

## 2024-08-22 DIAGNOSIS — E53.8 VITAMIN B12 DEFICIENCY: ICD-10-CM

## 2024-08-22 DIAGNOSIS — F99 INSOMNIA DUE TO OTHER MENTAL DISORDER: ICD-10-CM

## 2024-08-22 DIAGNOSIS — Z12.11 ENCOUNTER FOR SCREENING FOR MALIGNANT NEOPLASM OF COLON: ICD-10-CM

## 2024-08-22 PROCEDURE — 99214 OFFICE O/P EST MOD 30 MIN: CPT | Performed by: NURSE PRACTITIONER

## 2024-08-22 RX ORDER — VENLAFAXINE HYDROCHLORIDE 75 MG/1
225 CAPSULE, EXTENDED RELEASE ORAL DAILY
Qty: 270 CAPSULE | Refills: 2 | Status: SHIPPED | OUTPATIENT
Start: 2024-08-22 | End: 2024-09-03 | Stop reason: SDUPTHER

## 2024-08-22 RX ORDER — HYDROXYZINE PAMOATE 50 MG/1
50 CAPSULE ORAL 2 TIMES DAILY PRN
Qty: 180 CAPSULE | Refills: 0 | Status: SHIPPED | OUTPATIENT
Start: 2024-08-22 | End: 2024-09-03 | Stop reason: SDUPTHER

## 2024-08-22 RX ORDER — MIRTAZAPINE 15 MG/1
15 TABLET, ORALLY DISINTEGRATING ORAL NIGHTLY
Qty: 90 TABLET | Refills: 0 | Status: SHIPPED | OUTPATIENT
Start: 2024-08-22 | End: 2024-09-03 | Stop reason: SDUPTHER

## 2024-08-22 RX ORDER — FLUCONAZOLE 150 MG/1
150 TABLET ORAL ONCE
Qty: 1 TABLET | Refills: 0 | Status: SHIPPED | OUTPATIENT
Start: 2024-08-22 | End: 2024-08-22

## 2024-08-22 NOTE — PROGRESS NOTES
"Chief Complaint  Med Refill    Subjective        Janis Rosa presents to [facility]  History of Present Illness  This is a 63-year-old female patient here today for follow-up.  She struggles with major depression and has seen psych in the past in [place] where she is currently on Effexor, Remeron and hydroxyzine as needed.  In the past she was treated with Xanax for anxiety.  Due to the drive she has not followed up with psych.  She does struggle with relationship with her  and has had multiple medical problems in the past with herself and her mom and feels like she constantly deals with anxiety.  Her  is now retired and is home a lot which is made anxiety slightly worse.  She denies any increased depression or any suicidal thoughts.      Objective   Vital Signs:  /70   Pulse 70   Temp 98.1 °F (36.7 °C) (Temporal)   Resp 14   Ht 171.5 cm (67.52\")   Wt 65.3 kg (144 lb)   SpO2 99%   BMI 22.21 kg/m²   Estimated body mass index is 22.21 kg/m² as calculated from the following:    Height as of this encounter: 171.5 cm (67.52\").    Weight as of this encounter: 65.3 kg (144 lb).    BMI is within normal parameters. No other follow-up for BMI required.      Physical Exam  Vitals and nursing note reviewed.   HENT:      Head: Normocephalic.      Nose: Nose normal.   Eyes:      Pupils: Pupils are equal, round, and reactive to light.   Cardiovascular:      Rate and Rhythm: Normal rate and regular rhythm.      Pulses: Normal pulses.      Heart sounds: Normal heart sounds.   Pulmonary:      Effort: Pulmonary effort is normal. No respiratory distress.      Breath sounds: Normal breath sounds. No wheezing or rales.   Abdominal:      General: There is no distension.      Tenderness: There is no abdominal tenderness.   Musculoskeletal:         General: No swelling.      Cervical back: Neck supple.      Right lower leg: No edema.      Left lower leg: No edema.   Skin:     " General: Skin is warm and dry.   Neurological:      Mental Status: She is alert and oriented to person, place, and time.   Psychiatric:         Mood and Affect: Mood normal.        Result Review :                Assessment and Plan   Diagnoses and all orders for this visit:    1. Generalized anxiety disorder with panic attacks (Primary)  -     Discontinue: mirtazapine (REMERON SOL-TAB) 15 MG disintegrating tablet; Place 1 tablet on the tongue Every Night.  Dispense: 90 tablet; Refill: 0  -     Discontinue: venlafaxine XR (EFFEXOR-XR) 75 MG 24 hr capsule; Take 3 capsules by mouth Daily.  Dispense: 270 capsule; Refill: 2  -     Discontinue: hydrOXYzine pamoate (Vistaril) 50 MG capsule; Take 1 capsule by mouth 2 (Two) Times a Day As Needed for Anxiety.  Dispense: 180 capsule; Refill: 0  -     mirtazapine (REMERON SOL-TAB) 15 MG disintegrating tablet; Place 1 tablet on the tongue Every Night.  Dispense: 90 tablet; Refill: 0  -     venlafaxine XR (EFFEXOR-XR) 75 MG 24 hr capsule; Take 3 capsules by mouth Daily.  Dispense: 270 capsule; Refill: 2  -     hydrOXYzine pamoate (Vistaril) 50 MG capsule; Take 1 capsule by mouth 2 (Two) Times a Day As Needed for Anxiety.  Dispense: 180 capsule; Refill: 0    2. Encounter for screening for malignant neoplasm of colon    3. Vitamin B12 deficiency  -     Vitamin B12  -     Folate    4. Vitamin D deficiency  -     Vitamin D,25-Hydroxy    5. Screening for deficiency anemia  -     CBC & Differential    6. Mixed hyperlipidemia  -     Comprehensive Metabolic Panel  -     Lipid Panel    7. Screening for hematuria or proteinuria  -     Urinalysis With Culture If Indicated - Urine, Clean Catch    8. Major depressive disorder, recurrent episode, moderate  -     Discontinue: mirtazapine (REMERON SOL-TAB) 15 MG disintegrating tablet; Place 1 tablet on the tongue Every Night.  Dispense: 90 tablet; Refill: 0  -     Discontinue: venlafaxine XR (EFFEXOR-XR) 75 MG 24 hr capsule; Take 3 capsules by  mouth Daily.  Dispense: 270 capsule; Refill: 2  -     Discontinue: hydrOXYzine pamoate (Vistaril) 50 MG capsule; Take 1 capsule by mouth 2 (Two) Times a Day As Needed for Anxiety.  Dispense: 180 capsule; Refill: 0  -     Ambulatory Referral to Psychiatry  -     mirtazapine (REMERON SOL-TAB) 15 MG disintegrating tablet; Place 1 tablet on the tongue Every Night.  Dispense: 90 tablet; Refill: 0  -     venlafaxine XR (EFFEXOR-XR) 75 MG 24 hr capsule; Take 3 capsules by mouth Daily.  Dispense: 270 capsule; Refill: 2  -     hydrOXYzine pamoate (Vistaril) 50 MG capsule; Take 1 capsule by mouth 2 (Two) Times a Day As Needed for Anxiety.  Dispense: 180 capsule; Refill: 0    9. Insomnia due to other mental disorder  -     Discontinue: mirtazapine (REMERON SOL-TAB) 15 MG disintegrating tablet; Place 1 tablet on the tongue Every Night.  Dispense: 90 tablet; Refill: 0  -     Discontinue: venlafaxine XR (EFFEXOR-XR) 75 MG 24 hr capsule; Take 3 capsules by mouth Daily.  Dispense: 270 capsule; Refill: 2  -     Discontinue: hydrOXYzine pamoate (Vistaril) 50 MG capsule; Take 1 capsule by mouth 2 (Two) Times a Day As Needed for Anxiety.  Dispense: 180 capsule; Refill: 0  -     mirtazapine (REMERON SOL-TAB) 15 MG disintegrating tablet; Place 1 tablet on the tongue Every Night.  Dispense: 90 tablet; Refill: 0  -     venlafaxine XR (EFFEXOR-XR) 75 MG 24 hr capsule; Take 3 capsules by mouth Daily.  Dispense: 270 capsule; Refill: 2  -     hydrOXYzine pamoate (Vistaril) 50 MG capsule; Take 1 capsule by mouth 2 (Two) Times a Day As Needed for Anxiety.  Dispense: 180 capsule; Refill: 0    Other orders  -     fluconazole (Diflucan) 150 MG tablet; Take 1 tablet by mouth 1 (One) Time for 1 dose.  Dispense: 1 tablet; Refill: 0  -     Specimen Status Report    We discussed her anxiety and I would like for her to continue to see psychiatry.  Discussed seeing [facility] in [place] and she has agreed to do that so I will put a referral in  for Bette Cabral today.  I will continue her medications as ordered.  She is fasting we will check fasting labs.      I spent a total of 30 minutes with the patient today including face-to-face encounter, reviewing data in the system, coordination of care with the nursing staff as well as consultants, documentation and entering orders.           Follow Up   No follow-ups on file.  Patient was given instructions and counseling regarding her condition or for health maintenance advice. Please see specific information pulled into the AVS if appropriate.

## 2024-08-23 LAB
25(OH)D3+25(OH)D2 SERPL-MCNC: 47.5 NG/ML (ref 30–100)
ALBUMIN SERPL-MCNC: 4.7 G/DL (ref 3.5–5.2)
ALBUMIN/GLOB SERPL: 1.8 G/DL
ALP SERPL-CCNC: 87 U/L (ref 39–117)
ALT SERPL-CCNC: 12 U/L (ref 1–33)
AST SERPL-CCNC: 22 U/L (ref 1–32)
BASOPHILS # BLD AUTO: 0.03 10*3/MM3 (ref 0–0.2)
BASOPHILS NFR BLD AUTO: 0.5 % (ref 0–1.5)
BILIRUB SERPL-MCNC: <0.2 MG/DL (ref 0–1.2)
BUN SERPL-MCNC: 15 MG/DL (ref 8–23)
BUN/CREAT SERPL: 19.2 (ref 7–25)
CALCIUM SERPL-MCNC: 9.8 MG/DL (ref 8.6–10.5)
CHLORIDE SERPL-SCNC: 100 MMOL/L (ref 98–107)
CHOLEST SERPL-MCNC: 215 MG/DL (ref 0–200)
CO2 SERPL-SCNC: 27.9 MMOL/L (ref 22–29)
CREAT SERPL-MCNC: 0.78 MG/DL (ref 0.57–1)
EGFRCR SERPLBLD CKD-EPI 2021: 86.5 ML/MIN/1.73
EOSINOPHIL # BLD AUTO: 0.04 10*3/MM3 (ref 0–0.4)
EOSINOPHIL NFR BLD AUTO: 0.7 % (ref 0.3–6.2)
ERYTHROCYTE [DISTWIDTH] IN BLOOD BY AUTOMATED COUNT: 12.9 % (ref 12.3–15.4)
FOLATE SERPL-MCNC: 15 NG/ML (ref 4.78–24.2)
GLOBULIN SER CALC-MCNC: 2.6 GM/DL
GLUCOSE SERPL-MCNC: 104 MG/DL (ref 65–99)
GLUCOSE UR QL STRIP: NORMAL
HCT VFR BLD AUTO: 41.1 % (ref 34–46.6)
HDLC SERPL-MCNC: 88 MG/DL (ref 40–60)
HGB BLD-MCNC: 13.6 G/DL (ref 12–15.9)
IMM GRANULOCYTES # BLD AUTO: 0.01 10*3/MM3 (ref 0–0.05)
IMM GRANULOCYTES NFR BLD AUTO: 0.2 % (ref 0–0.5)
KETONES UR QL STRIP: NORMAL
LDLC SERPL CALC-MCNC: 117 MG/DL (ref 0–100)
LYMPHOCYTES # BLD AUTO: 1.47 10*3/MM3 (ref 0.7–3.1)
LYMPHOCYTES NFR BLD AUTO: 25.4 % (ref 19.6–45.3)
MCH RBC QN AUTO: 29.6 PG (ref 26.6–33)
MCHC RBC AUTO-ENTMCNC: 33.1 G/DL (ref 31.5–35.7)
MCV RBC AUTO: 89.5 FL (ref 79–97)
MONOCYTES # BLD AUTO: 0.64 10*3/MM3 (ref 0.1–0.9)
MONOCYTES NFR BLD AUTO: 11.1 % (ref 5–12)
NEUTROPHILS # BLD AUTO: 3.6 10*3/MM3 (ref 1.7–7)
NEUTROPHILS NFR BLD AUTO: 62.1 % (ref 42.7–76)
NRBC BLD AUTO-RTO: 0 /100 WBC (ref 0–0.2)
PH UR STRIP: NORMAL [PH]
PLATELET # BLD AUTO: 362 10*3/MM3 (ref 140–450)
POTASSIUM SERPL-SCNC: 3.7 MMOL/L (ref 3.5–5.2)
PROT SERPL-MCNC: 7.3 G/DL (ref 6–8.5)
PROT UR QL STRIP: NORMAL
RBC # BLD AUTO: 4.59 10*6/MM3 (ref 3.77–5.28)
SODIUM SERPL-SCNC: 139 MMOL/L (ref 136–145)
SP GR UR STRIP: NORMAL
SPECIMEN STATUS: NORMAL
TRIGL SERPL-MCNC: 55 MG/DL (ref 0–150)
VIT B12 SERPL-MCNC: 504 PG/ML (ref 211–946)
VLDLC SERPL CALC-MCNC: 10 MG/DL (ref 5–40)
WBC # BLD AUTO: 5.79 10*3/MM3 (ref 3.4–10.8)

## 2024-08-28 NOTE — PROGRESS NOTES
Total cholesterol and LDL is still elevated but has improved since last blood check.  Continue to work on diet and exercise.  Follow-up labs are in good range.  Would like to repeat again in 6 months

## 2024-09-03 RX ORDER — MIRTAZAPINE 15 MG/1
15 TABLET, ORALLY DISINTEGRATING ORAL NIGHTLY
Qty: 90 TABLET | Refills: 0 | Status: SHIPPED | OUTPATIENT
Start: 2024-09-03

## 2024-09-03 RX ORDER — HYDROXYZINE PAMOATE 50 MG/1
50 CAPSULE ORAL 2 TIMES DAILY PRN
Qty: 180 CAPSULE | Refills: 0 | Status: SHIPPED | OUTPATIENT
Start: 2024-09-03

## 2024-09-03 RX ORDER — VENLAFAXINE HYDROCHLORIDE 75 MG/1
225 CAPSULE, EXTENDED RELEASE ORAL DAILY
Qty: 270 CAPSULE | Refills: 2 | Status: SHIPPED | OUTPATIENT
Start: 2024-09-03

## 2025-01-22 ENCOUNTER — TRANSCRIBE ORDERS (OUTPATIENT)
Dept: ADMINISTRATIVE | Facility: HOSPITAL | Age: 65
End: 2025-01-22
Payer: COMMERCIAL

## 2025-01-22 DIAGNOSIS — R42 GIDDINESS: ICD-10-CM

## 2025-01-22 DIAGNOSIS — R42 DIZZINESS: Primary | ICD-10-CM

## 2025-01-22 DIAGNOSIS — C43.9 MELANOMA OF SKIN: ICD-10-CM

## 2025-01-30 ENCOUNTER — OFFICE VISIT (OUTPATIENT)
Dept: FAMILY MEDICINE CLINIC | Facility: CLINIC | Age: 65
End: 2025-01-30
Payer: COMMERCIAL

## 2025-01-30 VITALS
RESPIRATION RATE: 17 BRPM | DIASTOLIC BLOOD PRESSURE: 76 MMHG | OXYGEN SATURATION: 98 % | HEART RATE: 93 BPM | SYSTOLIC BLOOD PRESSURE: 128 MMHG | BODY MASS INDEX: 21.82 KG/M2 | WEIGHT: 144 LBS | TEMPERATURE: 98.4 F | HEIGHT: 68 IN

## 2025-01-30 DIAGNOSIS — R05.9 COUGH, UNSPECIFIED TYPE: Primary | ICD-10-CM

## 2025-01-30 LAB
EXPIRATION DATE: NORMAL
FLUAV AG UPPER RESP QL IA.RAPID: NOT DETECTED
FLUBV AG UPPER RESP QL IA.RAPID: NOT DETECTED
INTERNAL CONTROL: NORMAL
Lab: NORMAL
SARS-COV-2 AG UPPER RESP QL IA.RAPID: NOT DETECTED

## 2025-01-30 PROCEDURE — 87428 SARSCOV & INF VIR A&B AG IA: CPT | Performed by: NURSE PRACTITIONER

## 2025-01-30 PROCEDURE — 99213 OFFICE O/P EST LOW 20 MIN: CPT | Performed by: NURSE PRACTITIONER

## 2025-01-30 RX ORDER — FAMOTIDINE 40 MG/1
1 TABLET, FILM COATED ORAL DAILY
COMMUNITY
Start: 2024-12-10

## 2025-01-30 RX ORDER — FLUTICASONE PROPIONATE 50 MCG
SPRAY, SUSPENSION (ML) NASAL
COMMUNITY
Start: 2024-10-10

## 2025-01-30 RX ORDER — METHYLPREDNISOLONE 4 MG/1
TABLET ORAL
Qty: 21 TABLET | Refills: 0 | Status: SHIPPED | OUTPATIENT
Start: 2025-01-30

## 2025-01-30 RX ORDER — AZITHROMYCIN 250 MG/1
TABLET, FILM COATED ORAL
Qty: 6 TABLET | Refills: 0 | Status: SHIPPED | OUTPATIENT
Start: 2025-01-30

## 2025-01-30 NOTE — PROGRESS NOTES
"Chief Complaint  Cough (Patient states that she has had symptoms for over a week)    Subjective        Janis Rosa presents to [facility]  History of Present Illness  This is a 65 yo here today for cough, ha and drainage. She reports symptoms for over a week. No shortness of breath or fever but does have wheezing. She does have nasal drainage and facial tenderness. Afebrile today.      Objective   Vital Signs:  /76   Pulse 93   Temp 98.4 °F (36.9 °C) (Infrared)   Resp 17   Ht 171.5 cm (67.52\")   Wt 65.3 kg (144 lb)   SpO2 98%   BMI 22.21 kg/m²   Estimated body mass index is 22.21 kg/m² as calculated from the following:    Height as of this encounter: 171.5 cm (67.52\").    Weight as of this encounter: 65.3 kg (144 lb).    BMI is within normal parameters. No other follow-up for BMI required.      Physical Exam   Result Review :                Assessment and Plan   Diagnoses and all orders for this visit:    1. Cough, unspecified type (Primary)  -     POCT SARS-CoV-2 Antigen DARCI + Flu    Other orders  -     azithromycin (Zithromax Z-Eh) 250 MG tablet; Take 2 tablets by mouth on day 1, then 1 tablet daily on days 2-5  Dispense: 6 tablet; Refill: 0  -     methylPREDNISolone (MEDROL) 4 MG dose pack; Take as directed on package instructions.  Dispense: 21 tablet; Refill: 0    Covid/flu neg         Follow Up   No follow-ups on file.  Patient was given instructions and counseling regarding her condition or for health maintenance advice. Please see specific information pulled into the AVS if appropriate.             "

## 2025-04-09 ENCOUNTER — TELEPHONE (OUTPATIENT)
Dept: FAMILY MEDICINE CLINIC | Facility: CLINIC | Age: 65
End: 2025-04-09

## 2025-04-09 NOTE — TELEPHONE ENCOUNTER
Caller: Janis Rosa    Relationship: Self    Best call back number: [phone]    What orders are you requesting (i.e. lab or imaging): MAMMOGRAM    In what timeframe would the patient need to come in: ASAP    Where will you receive your lab/imaging services: [facility] IN [place]     Additional notes: SHE IS DUE

## 2025-04-10 DIAGNOSIS — Z12.31 ENCOUNTER FOR SCREENING MAMMOGRAM FOR MALIGNANT NEOPLASM OF BREAST: Primary | ICD-10-CM

## 2025-05-29 ENCOUNTER — HOSPITAL ENCOUNTER (OUTPATIENT)
Dept: MAMMOGRAPHY | Facility: HOSPITAL | Age: 65
Discharge: HOME | End: 2025-05-29
Admitting: NURSE PRACTITIONER
Payer: COMMERCIAL

## 2025-05-29 DIAGNOSIS — Z12.31 ENCOUNTER FOR SCREENING MAMMOGRAM FOR MALIGNANT NEOPLASM OF BREAST: ICD-10-CM

## 2025-05-29 PROCEDURE — 77067 SCR MAMMO BI INCL CAD: CPT

## 2025-05-29 PROCEDURE — 77063 BREAST TOMOSYNTHESIS BI: CPT

## 2025-06-30 DIAGNOSIS — F41.0 GENERALIZED ANXIETY DISORDER WITH PANIC ATTACKS: ICD-10-CM

## 2025-06-30 DIAGNOSIS — F41.1 GENERALIZED ANXIETY DISORDER WITH PANIC ATTACKS: ICD-10-CM

## 2025-06-30 DIAGNOSIS — F33.1 MAJOR DEPRESSIVE DISORDER, RECURRENT EPISODE, MODERATE: ICD-10-CM

## 2025-06-30 DIAGNOSIS — F99 INSOMNIA DUE TO OTHER MENTAL DISORDER: ICD-10-CM

## 2025-06-30 DIAGNOSIS — F51.05 INSOMNIA DUE TO OTHER MENTAL DISORDER: ICD-10-CM

## 2025-06-30 RX ORDER — HYDROXYZINE PAMOATE 50 MG/1
CAPSULE ORAL
Qty: 180 CAPSULE | Refills: 0 | Status: SHIPPED | OUTPATIENT
Start: 2025-06-30

## 2025-06-30 NOTE — TELEPHONE ENCOUNTER
I spoke with patient and she does have enough medication to last until July 8th when Kim is back in office. She is also going to call back to get a appointment scheduled.

## (undated) DEVICE — SOL NACL 0.9PCT 1000ML

## (undated) DEVICE — VISUALIZATION SYSTEM: Brand: CLEARIFY

## (undated) DEVICE — HARMONIC ACE +7 LAPAROSCOPIC SHEARS ADVANCED HEMOSTASIS 5MM DIAMETER 36CM SHAFT LENGTH  FOR USE WITH GRAY HAND PIECE ONLY: Brand: HARMONIC ACE

## (undated) DEVICE — DISPOSABLE GRASPER CARTRIDGE: Brand: DIRECT DRIVE REPOSABLE GRASPERS

## (undated) DEVICE — Device

## (undated) DEVICE — SUT SILK 2/0 TIES 18IN A185H

## (undated) DEVICE — SUT SILK 0 TIES 18IN SA66G

## (undated) DEVICE — KT ORCA ORCAPOD DISP STRL

## (undated) DEVICE — BW-412T DISP COMBO CLEANING BRUSH: Brand: SINGLE USE COMBINATION CLEANING BRUSH

## (undated) DEVICE — TUBING, SUCTION, 1/4" X 10', STRAIGHT: Brand: MEDLINE

## (undated) DEVICE — Device: Brand: DEFENDO AIR/WATER/SUCTION AND BIOPSY VALVE

## (undated) DEVICE — LOU LAP SIGMOID COLON: Brand: MEDLINE INDUSTRIES, INC.

## (undated) DEVICE — ECHELON FLEX 60 ARTICULATING ENDOSCOPIC LINEAR CUTTER (NO CARTRIDGE): Brand: ECHELON FLEX ENDOPATH

## (undated) DEVICE — SUT PDS 0 CT1 36IN Z346H

## (undated) DEVICE — SUT VIC 0 TN 27IN DYED JTN0G

## (undated) DEVICE — LAPAROSCOPIC GAS CONDITIONING DEVICE.: Brand: INSUFLOW

## (undated) DEVICE — CANN NASL CO2 TRULINK W/O2 A/

## (undated) DEVICE — MSK PROC CURAPLEX O2 2/ADAPT 7FT

## (undated) DEVICE — SKIN PREP TRAY W/CHG: Brand: MEDLINE INDUSTRIES, INC.

## (undated) DEVICE — ENDOPATH XCEL BLADELESS TROCARS WITH STABILITY SLEEVES: Brand: ENDOPATH XCEL

## (undated) DEVICE — LINER SURG CANSTR SXN S/RIGD 1500CC

## (undated) DEVICE — WOUND RETRACTOR AND PROTECTOR: Brand: ALEXIS WOUND PROTECTOR-RETRACTOR

## (undated) DEVICE — SUT VIC 5/0 PS2 18IN J495H

## (undated) DEVICE — COVER,MAYO STAND,STERILE: Brand: MEDLINE

## (undated) DEVICE — ADAPT CLN BIOGUARD AIR/H2O DISP

## (undated) DEVICE — THE TORRENT IRRIGATION SCOPE CONNECTOR IS USED WITH THE TORRENT IRRIGATION TUBING TO PROVIDE IRRIGATION FLUIDS SUCH AS STERILE WATER DURING GASTROINTESTINAL ENDOSCOPIC PROCEDURES WHEN USED IN CONJUNCTION WITH AN IRRIGATION PUMP (OR ELECTROSURGICAL UNIT).: Brand: TORRENT

## (undated) DEVICE — TBG 02 CRUSH RESIST LF CLR 7FT

## (undated) DEVICE — ELECTRD BLD EDGE/INSUL1P 2.4X5.1MM STRL

## (undated) DEVICE — SUT SILK 3/0 TIES 18IN A184H

## (undated) DEVICE — ENDOPATH PNEUMONEEDLE INSUFFLATION NEEDLES WITH LUER LOCK CONNECTORS 120MM: Brand: ENDOPATH

## (undated) DEVICE — SENSR O2 OXIMAX FNGR A/ 18IN NONSTR

## (undated) DEVICE — TOTAL TRAY, 16FR 10ML SIL FOLEY, URN: Brand: MEDLINE

## (undated) DEVICE — BITEBLOCK OMNI BLOC

## (undated) DEVICE — SUT SILK 2/0 SH CR8 18IN CR8 C012D

## (undated) DEVICE — VIAL FORMALIN CAP 10P 40ML

## (undated) DEVICE — ENCORE® LATEX ORTHO SIZE 7.5, STERILE LATEX POWDER-FREE SURGICAL GLOVE: Brand: ENCORE

## (undated) DEVICE — GOWN ,SIRUS,NONREINFORCED SMALL: Brand: MEDLINE

## (undated) DEVICE — ENDOCUT SCISSOR TIP, DISPOSABLE: Brand: RENEW

## (undated) DEVICE — LAPAROSCOPIC SMOKE ELIMINATION DEVICE: Brand: PNEUVIEW XE

## (undated) DEVICE — FRCP BX RADJAW4 NDL 2.8 240CM LG OG BX40

## (undated) DEVICE — GLV SURG BIOGEL LTX PF 6

## (undated) DEVICE — LN SMPL CO2 SHTRM SD STREAM W/M LUER

## (undated) DEVICE — SOL IRR H2O BTL 1000ML STRL

## (undated) DEVICE — ADHS SKIN DERMABOND TOP ADVANCED

## (undated) DEVICE — SUT PROLN 2/0 SH 36IN 8523H

## (undated) DEVICE — ENDOPATH XCEL UNIVERSAL TROCAR STABLILITY SLEEVES: Brand: ENDOPATH XCEL